# Patient Record
Sex: MALE | Race: WHITE | NOT HISPANIC OR LATINO | Employment: OTHER | URBAN - METROPOLITAN AREA
[De-identification: names, ages, dates, MRNs, and addresses within clinical notes are randomized per-mention and may not be internally consistent; named-entity substitution may affect disease eponyms.]

---

## 2017-01-01 ENCOUNTER — OFFICE VISIT (OUTPATIENT)
Dept: RETAIL CLINIC | Facility: CLINIC | Age: 77
End: 2017-01-01

## 2017-01-01 ENCOUNTER — HOSPITAL ENCOUNTER (OUTPATIENT)
Dept: CARDIOLOGY | Facility: HOSPITAL | Age: 77
Setting detail: RECURRING SERIES
Discharge: HOME | End: 2017-08-04

## 2017-01-01 ENCOUNTER — TELEPHONE (OUTPATIENT)
Dept: CARDIOLOGY | Facility: CLINIC | Age: 77
End: 2017-01-01

## 2017-01-01 ENCOUNTER — HOSPITAL ENCOUNTER (OUTPATIENT)
Dept: CARDIOLOGY | Facility: HOSPITAL | Age: 77
Setting detail: RECURRING SERIES
Discharge: HOME | End: 2017-09-25

## 2017-01-01 ENCOUNTER — HOSPITAL ENCOUNTER (OUTPATIENT)
Dept: CARDIOLOGY | Facility: HOSPITAL | Age: 77
Discharge: HOME | End: 2017-11-14
Attending: INTERNAL MEDICINE | Admitting: INTERNAL MEDICINE

## 2017-01-01 ENCOUNTER — HOSPITAL ENCOUNTER (OUTPATIENT)
Dept: CARDIOLOGY | Facility: HOSPITAL | Age: 77
Setting detail: RECURRING SERIES
Discharge: HOME | End: 2017-09-01

## 2017-01-01 ENCOUNTER — HOSPITAL ENCOUNTER (OUTPATIENT)
Dept: CARDIOLOGY | Facility: HOSPITAL | Age: 77
Setting detail: RECURRING SERIES
Discharge: HOME | End: 2017-09-22

## 2017-01-01 ENCOUNTER — OFFICE VISIT (OUTPATIENT)
Dept: CARDIOLOGY | Facility: CLINIC | Age: 77
End: 2017-01-01

## 2017-01-01 ENCOUNTER — HOSPITAL ENCOUNTER (OUTPATIENT)
Dept: CARDIOLOGY | Facility: HOSPITAL | Age: 77
Setting detail: RECURRING SERIES
Discharge: HOME | End: 2017-11-03

## 2017-01-01 ENCOUNTER — HOSPITAL ENCOUNTER (OUTPATIENT)
Dept: CARDIOLOGY | Facility: HOSPITAL | Age: 77
Setting detail: RECURRING SERIES
Discharge: HOME | End: 2017-10-20

## 2017-01-01 ENCOUNTER — HOSPITAL ENCOUNTER (OUTPATIENT)
Dept: CARDIOLOGY | Facility: HOSPITAL | Age: 77
Setting detail: RECURRING SERIES
Discharge: HOME | End: 2017-12-01

## 2017-01-01 ENCOUNTER — HOSPITAL ENCOUNTER (OUTPATIENT)
Dept: CARDIOLOGY | Facility: HOSPITAL | Age: 77
Setting detail: RECURRING SERIES
Discharge: HOME | End: 2017-07-07

## 2017-01-01 VITALS
BODY MASS INDEX: 19.55 KG/M2 | HEART RATE: 86 BPM | SYSTOLIC BLOOD PRESSURE: 110 MMHG | WEIGHT: 129 LBS | HEIGHT: 68 IN | DIASTOLIC BLOOD PRESSURE: 64 MMHG

## 2017-01-01 VITALS
DIASTOLIC BLOOD PRESSURE: 60 MMHG | HEART RATE: 124 BPM | OXYGEN SATURATION: 98 % | RESPIRATION RATE: 16 BRPM | SYSTOLIC BLOOD PRESSURE: 90 MMHG

## 2017-01-01 VITALS
SYSTOLIC BLOOD PRESSURE: 110 MMHG | HEART RATE: 79 BPM | BODY MASS INDEX: 19.93 KG/M2 | WEIGHT: 124 LBS | DIASTOLIC BLOOD PRESSURE: 64 MMHG | HEIGHT: 66 IN

## 2017-01-01 DIAGNOSIS — J43.9 PULMONARY EMPHYSEMA, UNSPECIFIED EMPHYSEMA TYPE (HCC): ICD-10-CM

## 2017-01-01 DIAGNOSIS — I48.20 CHRONIC ATRIAL FIBRILLATION (HCC): ICD-10-CM

## 2017-01-01 DIAGNOSIS — Z95.5 S/P DRUG ELUTING CORONARY STENT PLACEMENT: ICD-10-CM

## 2017-01-01 DIAGNOSIS — I25.10 CORONARY ARTERY DISEASE INVOLVING NATIVE CORONARY ARTERY OF NATIVE HEART WITHOUT ANGINA PECTORIS: ICD-10-CM

## 2017-01-01 DIAGNOSIS — I48.91 ATRIAL FIBRILLATION (HCC): ICD-10-CM

## 2017-01-01 DIAGNOSIS — I10 ESSENTIAL HYPERTENSION: ICD-10-CM

## 2017-01-01 DIAGNOSIS — S51.812A SKIN TEAR OF LEFT FOREARM WITHOUT COMPLICATION, INITIAL ENCOUNTER: Primary | ICD-10-CM

## 2017-01-01 DIAGNOSIS — Z99.2 HEMODIALYSIS PATIENT (HCC): ICD-10-CM

## 2017-01-01 DIAGNOSIS — E78.49 OTHER HYPERLIPIDEMIA: ICD-10-CM

## 2017-01-01 DIAGNOSIS — Z99.2 STAGE 5 CHRONIC KIDNEY DISEASE ON CHRONIC DIALYSIS (HCC): ICD-10-CM

## 2017-01-01 DIAGNOSIS — N18.6 STAGE 5 CHRONIC KIDNEY DISEASE ON CHRONIC DIALYSIS (HCC): ICD-10-CM

## 2017-01-01 DIAGNOSIS — I25.10 CORONARY ARTERY DISEASE INVOLVING NATIVE CORONARY ARTERY OF NATIVE HEART WITHOUT ANGINA PECTORIS: Primary | ICD-10-CM

## 2017-01-01 LAB
BH CV ECHO MEAS - ACS: 1.3 CM
BH CV ECHO MEAS - AO MAX PG (FULL): 4.4 MMHG
BH CV ECHO MEAS - AO MAX PG: 5.6 MMHG
BH CV ECHO MEAS - AO MEAN PG (FULL): 2.7 MMHG
BH CV ECHO MEAS - AO MEAN PG: 3.3 MMHG
BH CV ECHO MEAS - AO ROOT AREA (BSA CORRECTED): 2.1
BH CV ECHO MEAS - AO ROOT AREA: 9.5 CM^2
BH CV ECHO MEAS - AO ROOT DIAM: 3.5 CM
BH CV ECHO MEAS - AO V2 MAX: 118.2 CM/SEC
BH CV ECHO MEAS - AO V2 MEAN: 83.9 CM/SEC
BH CV ECHO MEAS - AO V2 VTI: 23 CM
BH CV ECHO MEAS - AVA(I,A): 1.3 CM^2
BH CV ECHO MEAS - AVA(I,D): 1.3 CM^2
BH CV ECHO MEAS - AVA(V,A): 1.3 CM^2
BH CV ECHO MEAS - AVA(V,D): 1.3 CM^2
BH CV ECHO MEAS - BSA(HAYCOCK): 1.6 M^2
BH CV ECHO MEAS - BSA: 1.6 M^2
BH CV ECHO MEAS - BZI_BMI: 20 KILOGRAMS/M^2
BH CV ECHO MEAS - BZI_METRIC_HEIGHT: 167.6 CM
BH CV ECHO MEAS - BZI_METRIC_WEIGHT: 56.2 KG
BH CV ECHO MEAS - CONTRAST EF 4CH: 41.9 ML/M^2
BH CV ECHO MEAS - EDV(MOD-SP4): 74 ML
BH CV ECHO MEAS - EDV(TEICH): 95.9 ML
BH CV ECHO MEAS - EF(CUBED): 75 %
BH CV ECHO MEAS - EF(MOD-SP4): 41.9 %
BH CV ECHO MEAS - EF(TEICH): 67 %
BH CV ECHO MEAS - ESV(MOD-SP4): 43 ML
BH CV ECHO MEAS - ESV(TEICH): 31.7 ML
BH CV ECHO MEAS - FS: 37 %
BH CV ECHO MEAS - IVS/LVPW: 1.1
BH CV ECHO MEAS - IVSD: 1 CM
BH CV ECHO MEAS - LAT PEAK E' VEL: 9 CM/SEC
BH CV ECHO MEAS - LV DIASTOLIC VOL/BSA (35-75): 45.3 ML/M^2
BH CV ECHO MEAS - LV MASS(C)D: 156.9 GRAMS
BH CV ECHO MEAS - LV MASS(C)DI: 96.1 GRAMS/M^2
BH CV ECHO MEAS - LV MAX PG: 1.2 MMHG
BH CV ECHO MEAS - LV MEAN PG: 0.62 MMHG
BH CV ECHO MEAS - LV SYSTOLIC VOL/BSA (12-30): 26.3 ML/M^2
BH CV ECHO MEAS - LV V1 MAX: 54.8 CM/SEC
BH CV ECHO MEAS - LV V1 MEAN: 35.5 CM/SEC
BH CV ECHO MEAS - LV V1 VTI: 10.6 CM
BH CV ECHO MEAS - LVIDD: 4.6 CM
BH CV ECHO MEAS - LVIDS: 2.9 CM
BH CV ECHO MEAS - LVLD AP4: 5.9 CM
BH CV ECHO MEAS - LVLS AP4: 5.3 CM
BH CV ECHO MEAS - LVOT AREA (M): 2.8 CM^2
BH CV ECHO MEAS - LVOT AREA: 2.7 CM^2
BH CV ECHO MEAS - LVOT DIAM: 1.9 CM
BH CV ECHO MEAS - LVPWD: 0.96 CM
BH CV ECHO MEAS - MED PEAK E' VEL: 7 CM/SEC
BH CV ECHO MEAS - MR MAX PG: 69.8 MMHG
BH CV ECHO MEAS - MR MAX VEL: 417.7 CM/SEC
BH CV ECHO MEAS - MV DEC SLOPE: 718 CM/SEC^2
BH CV ECHO MEAS - MV DEC TIME: 0.15 SEC
BH CV ECHO MEAS - MV E MAX VEL: 112.7 CM/SEC
BH CV ECHO MEAS - MV MAX PG: 4.6 MMHG
BH CV ECHO MEAS - MV MEAN PG: 1.5 MMHG
BH CV ECHO MEAS - MV P1/2T MAX VEL: 112.7 CM/SEC
BH CV ECHO MEAS - MV P1/2T: 46 MSEC
BH CV ECHO MEAS - MV V2 MAX: 106.7 CM/SEC
BH CV ECHO MEAS - MV V2 MEAN: 53.4 CM/SEC
BH CV ECHO MEAS - MV V2 VTI: 24.4 CM
BH CV ECHO MEAS - MVA P1/2T LCG: 2 CM^2
BH CV ECHO MEAS - MVA(P1/2T): 4.8 CM^2
BH CV ECHO MEAS - MVA(VTI): 1.2 CM^2
BH CV ECHO MEAS - PA MAX PG (FULL): 2.3 MMHG
BH CV ECHO MEAS - PA MAX PG: 2.9 MMHG
BH CV ECHO MEAS - PA V2 MAX: 84.5 CM/SEC
BH CV ECHO MEAS - PVA(V,A): 1.5 CM^2
BH CV ECHO MEAS - PVA(V,D): 1.5 CM^2
BH CV ECHO MEAS - QP/QS: 1
BH CV ECHO MEAS - RAP SYSTOLE: 15 MMHG
BH CV ECHO MEAS - RV MAX PG: 0.59 MMHG
BH CV ECHO MEAS - RV MEAN PG: 0.39 MMHG
BH CV ECHO MEAS - RV V1 MAX: 38.3 CM/SEC
BH CV ECHO MEAS - RV V1 MEAN: 29.9 CM/SEC
BH CV ECHO MEAS - RV V1 VTI: 9 CM
BH CV ECHO MEAS - RVOT AREA: 3.2 CM^2
BH CV ECHO MEAS - RVOT DIAM: 2 CM
BH CV ECHO MEAS - RVSP: 65.7 MMHG
BH CV ECHO MEAS - SI(AO): 134.8 ML/M^2
BH CV ECHO MEAS - SI(CUBED): 43.9 ML/M^2
BH CV ECHO MEAS - SI(LVOT): 17.7 ML/M^2
BH CV ECHO MEAS - SI(MOD-SP4): 19 ML/M^2
BH CV ECHO MEAS - SI(TEICH): 39.3 ML/M^2
BH CV ECHO MEAS - SUP REN AO DIAM: 1.8 CM
BH CV ECHO MEAS - SV(AO): 220 ML
BH CV ECHO MEAS - SV(CUBED): 71.6 ML
BH CV ECHO MEAS - SV(LVOT): 29 ML
BH CV ECHO MEAS - SV(MOD-SP4): 31 ML
BH CV ECHO MEAS - SV(RVOT): 29 ML
BH CV ECHO MEAS - SV(TEICH): 64.2 ML
BH CV ECHO MEAS - TAPSE (>1.6): 1.3 CM2
BH CV ECHO MEAS - TR MAX VEL: 355.9 CM/SEC
BH CV XLRA - RV BASE: 5 CM
BH CV XLRA - TDI S': 11 CM/SEC
E/E' RATIO: 14
LEFT ATRIUM VOLUME INDEX: 45 ML/M2
LEFT ATRIUM VOLUME: 77 CM3
LV EF 2D ECHO EST: 42 %
MAXIMAL PREDICTED HEART RATE: 143 BPM
STRESS TARGET HR: 122 BPM

## 2017-01-01 PROCEDURE — 36416 COLLJ CAPILLARY BLOOD SPEC: CPT

## 2017-01-01 PROCEDURE — 93000 ELECTROCARDIOGRAM COMPLETE: CPT | Performed by: INTERNAL MEDICINE

## 2017-01-01 PROCEDURE — 85610 PROTHROMBIN TIME: CPT

## 2017-01-01 PROCEDURE — 93306 TTE W/DOPPLER COMPLETE: CPT | Performed by: INTERNAL MEDICINE

## 2017-01-01 PROCEDURE — 99214 OFFICE O/P EST MOD 30 MIN: CPT | Performed by: INTERNAL MEDICINE

## 2017-01-01 PROCEDURE — 99213 OFFICE O/P EST LOW 20 MIN: CPT | Performed by: NURSE PRACTITIONER

## 2017-01-01 PROCEDURE — 93306 TTE W/DOPPLER COMPLETE: CPT

## 2017-01-01 RX ORDER — WARFARIN SODIUM 5 MG/1
TABLET ORAL
Qty: 30 TABLET | Refills: 1 | Status: SHIPPED | OUTPATIENT
Start: 2017-01-01 | End: 2017-01-01 | Stop reason: SDUPTHER

## 2017-01-01 RX ORDER — WARFARIN SODIUM 5 MG/1
TABLET ORAL
Qty: 30 TABLET | Refills: 1 | Status: SHIPPED | OUTPATIENT
Start: 2017-01-01 | End: 2018-01-01 | Stop reason: SDUPTHER

## 2017-01-01 RX ORDER — METOPROLOL SUCCINATE 50 MG/1
50 TABLET, EXTENDED RELEASE ORAL DAILY
Qty: 90 TABLET | Refills: 0 | Status: SHIPPED | OUTPATIENT
Start: 2017-01-01 | End: 2017-01-01 | Stop reason: SDUPTHER

## 2017-01-01 RX ORDER — WARFARIN SODIUM 2.5 MG/1
TABLET ORAL
Qty: 30 TABLET | Refills: 0 | Status: SHIPPED | OUTPATIENT
Start: 2017-01-01

## 2017-01-01 RX ORDER — ATORVASTATIN CALCIUM 40 MG/1
TABLET, FILM COATED ORAL
Qty: 30 TABLET | Refills: 3 | Status: SHIPPED | OUTPATIENT
Start: 2017-01-01 | End: 2018-01-01 | Stop reason: SDUPTHER

## 2017-01-01 RX ORDER — CLOPIDOGREL BISULFATE 75 MG/1
TABLET ORAL
Qty: 30 TABLET | Refills: 5 | Status: SHIPPED | OUTPATIENT
Start: 2017-01-01 | End: 2017-01-01

## 2017-01-01 RX ORDER — METOPROLOL SUCCINATE 50 MG/1
TABLET, EXTENDED RELEASE ORAL
Qty: 90 TABLET | Refills: 0 | Status: SHIPPED | OUTPATIENT
Start: 2017-01-01 | End: 2017-01-01

## 2017-01-01 RX ORDER — ATORVASTATIN CALCIUM 40 MG/1
TABLET, FILM COATED ORAL
Qty: 30 TABLET | Refills: 0 | Status: SHIPPED | OUTPATIENT
Start: 2017-01-01 | End: 2017-01-01 | Stop reason: SDUPTHER

## 2017-01-03 ENCOUNTER — OFFICE VISIT (OUTPATIENT)
Dept: SURGERY | Facility: CLINIC | Age: 77
End: 2017-01-03

## 2017-01-03 VITALS
TEMPERATURE: 98 F | DIASTOLIC BLOOD PRESSURE: 77 MMHG | BODY MASS INDEX: 22.13 KG/M2 | HEART RATE: 102 BPM | OXYGEN SATURATION: 97 % | SYSTOLIC BLOOD PRESSURE: 119 MMHG | HEIGHT: 68 IN | WEIGHT: 146 LBS

## 2017-01-03 DIAGNOSIS — Z79.01 ANTICOAGULATED ON COUMADIN: Primary | ICD-10-CM

## 2017-01-03 PROCEDURE — 99214 OFFICE O/P EST MOD 30 MIN: CPT | Performed by: SURGERY

## 2017-01-03 NOTE — PROGRESS NOTES
Consultation note    Referring physician: Pollo Mccann MD    Consulting Physician: James Fisher MD    Reason for consultation:     Chief Complaint   Patient presents with   • PD CATHETER COMPLICATION     Blood in Periotenal Fluid       HPI:   The patient is a very pleasant 76 y.o. years old male that is referred to my office by Dr. Mccann because of hemoperitoneum.  Patient has end-stage renal disease and is on peritoneal dialysis.  4 days ago he noticed bloody fluid during peritoneal dialysis.  This now has been going on for 4 days.  He reports also some episodes of nonbloody nonbilious vomiting.  Patient initially stated that he has been having several episodes for the past 4 days.  His wife reports that is mainly spitting but not vomiting.  He has been able to tolerate by mouth for the past 3 days without any problem.  He has been passing gas and having bowel movements.  He denies any fevers or chills or any abdominal pain.  He reports no recent abdominal trauma but reports falling over his face 1 month ago.  He has been doing rehabilitation therapy without any problem.  He had a recent underwent cardiac catheterization with drug-eluting stent placement and was started on Plavix.  He was anteriorly on warfarin for chronic atrial fibrillation.  He denies any episode of hemoperitoneum in the past.  Denies any other complaints.  He had peritoneal fluid sent for analysis that show 180 white blood cells and 11,000] red blood cells.  I do not have the differential.  He has been started on antibiotics by Dr. Mccann as well as on heparin irrigation at the peritoneal fluid.  The patient reports having issues with his INR level and warfarin therapy. No other complaints.      Past Medical History   Diagnosis Date   • Anemia    • Atrial fibrillation    • BPH (benign prostatic hyperplasia)    • Chronic kidney disease    • COPD (chronic obstructive pulmonary disease)    • Dialysis patient    • ED (erectile  dysfunction)    • Hyperlipidemia    • Hypertension    • Impaired fasting glucose    • Prostate cancer    • Renal insufficiency        Past Surgical History   Procedure Laterality Date   • Prostatectomy     • Cataract extraction w/ intraocular lens implant Bilateral    • Cardiac catheterization N/A 10/27/2016     Procedure: Coronary angiography;  Surgeon: Jessie Espino MD;  Location:  [facility];  Service:    • Cardiac catheterization N/A 10/27/2016     Procedure: Left Heart Cath;  Surgeon: Jessie Espino MD;  Location:  [facility];  Service:    • Av fistula placement, brachiocephalic Left 11/24/2014     Dr. Collin Byers,   • Angioplasty Left 11/24/2014     Angioplasty of left cephalic vein, 5 mm x 150 mm balloon, Dr. Collin Byers   • Insertion peritoneal dialysis catheter N/A 01/02/2015     Dr. Loi Gutierrez   • Inguinal hernia repair Right 01/02/2015     Open Right Inguinal Hernia repair w/ mesh, Dr. Loi Gutierrez   • Angioplasty Left 04/06/2015     Ligation of side brances x2 of Left Arm Arteriovenous fistula, Percutaneous angioplasty, left arm arteriovenous fistula, Dr. Collin Byers   • Umbilical hernia repair N/A 08/13/2015     Open Umbilical Hernia Repair, Dr. James Fisher         Current Outpatient Prescriptions:   •  atorvastatin (LIPITOR) 40 MG tablet, Take 1 tablet by mouth Every Night., Disp: 30 tablet, Rfl: 11  •  Cholecalciferol (VITAMIN D3) 5000 UNITS capsule capsule, Take 5,000 Units by mouth Daily., Disp: , Rfl:   •  clopidogrel (PLAVIX) 75 MG tablet, Take 1 tablet by mouth Daily., Disp: 30 tablet, Rfl: 11  •  Fluticasone Furoate-Vilanterol 200-25 MCG/INH aerosol powder , Inhale 1 puff daily., Disp: , Rfl:   •  folic acid (FOLVITE) 1 MG tablet, Take 1 mg by mouth daily., Disp: , Rfl:   •  metoprolol succinate XL (TOPROL-XL) 50 MG 24 hr tablet, Take 1 tablet by mouth Daily., Disp: 90 tablet, Rfl: 0  •  sodium bicarbonate 650 MG tablet, Take 650 mg by  mouth 2 (Two) Times a Day., Disp: , Rfl:   •  Umeclidinium Bromide (INCRUSE ELLIPTA) 62.5 MCG/INH aerosol powder , Inhale 1 puff Daily., Disp: , Rfl:   •  warfarin (COUMADIN) 2.5 MG tablet, Take 1.25 mg by mouth See Admin Instructions. PT TAKES 1.25MG TUES-SUN.  PT TAKES 2.5MG ON MONDAY, Disp: , Rfl:   •  warfarin (COUMADIN) 2.5 MG tablet, TAKE 1 TABLET BY MOUTH ON MONDAY & FRIDAY AND 1/2 TABLET ON ALL OTHER DAYS OR AS DIRECTED, Disp: 30 tablet, Rfl: 0  •  acetaminophen (TYLENOL) 500 MG tablet, Take 500 mg by mouth every 6 (six) hours as needed for mild pain (1-3)., Disp: , Rfl:     No Known Allergies    Social History     Social History   • Marital status:      Spouse name: N/A   • Number of children: N/A   • Years of education: N/A     Occupational History   • Not on file.     Social History Main Topics   • Smoking status: Former Smoker     Packs/day: 1.50   • Smokeless tobacco: Not on file      Comment: quit 15 years ago   • Alcohol use 1.2 oz/week     2 Cans of beer per week      Comment: SELDOMLY   • Drug use: No   • Sexual activity: Defer     Other Topics Concern   • Not on file     Social History Narrative       Family History   Problem Relation Age of Onset   • Heart attack Mother    • Heart disease Mother    • Heart attack Father    • Heart disease Father    • No Known Problems Sister    • No Known Problems Maternal Grandmother    • No Known Problems Maternal Grandfather    • No Known Problems Paternal Grandmother    • No Known Problems Paternal Grandfather    • No Known Problems Sister        ROS:   Constitutional: denies any weight changes, reports fatigue and weakness.  Eyes: : denies blurred or double vision  Cardiovascular: denies chest pain, palpitations, reports edemas.  Respiratory: denies cough, sputum, SOB.  Gastrointestinal: reports N&V, denies abd pain, denies diarrhea or constipation.  Genitourinary: denies dysuria, frequency.  Endocrine: denies cold intolerance, lethargy and  flushing.  Hem: denies excessive bruising and postop bleeding.  Musculoskeletal: denies weakness, joint swelling, pain or stiffness.  Neuro: denies seizures, CVA, paresthesia, or peripheral neuropathy.   Skin: denies change in nevi, rashes, masses.    Physical Exam:   Vitals:    01/03/17 1515   BP: 119/77   Pulse: 102   Temp: 98 °F (36.7 °C)   SpO2: 97%     GENERAL:oriented to person, place, and time and chronically ill appearing  HEENT: normochephalic, atraumatic, no scleral icterus moist mucous membranes.  NECK: Supple there is no thyromegaly or lymphadenopathy  CHEST: clear to auscultation, no wheezes, rales or rhonchi, symmetric air entry  CARDIAC: regular rate and rhythm    ABDOMEN: soft, mildly tender at the epigastric area, distended due to intraperitoneal fluid, no masses or organomegaly. PD catheter in place. No rebound or guarding, no peritoneal signs. Incision from hernia repair well healed. Mild erythema at the infraumbilical area. Not tender. Peritoneal fluid is bloody tinged.  EXTREMITIES: no cyanosis, clubbing or edema    NEURO: alert and oriented, normal speech, cranial nerves 2-12 grossly intact, no focal deficits   SKIN: Moist, warm, no rashes.    Assessment and plan:   The patient is a very pleasant 76 y.o. years old male with end-stage renal disease on peritoneal dialysis now with hemoperitoneum and peritonitis.  He has been started on appropriate antibiotic therapy.  Clinically, he is hemodynamically stable and does not have any evidence of profuse intraperitoneal bleeding.  The fluid is blood tinged and not bright red.  He was recently started on Plavix and he has been on warfarin and this likely contributed to the hemoperitoneum.  He denies any recent trauma that may cause intra-abdominal bleeding.  He has small amount of erythema at the umbilicus that I would continue to watch.  I reviewed my operative report and he does not have mesh at the prior repair.  He reports episodes of vomiting but  he has been tolerating diet.  At this time I think he should continue with conservative management.     - I will check INR and CBC to measure his no severely anemic or supratherapeutic with warfarin therapy  - I will discuss with Dr. Espino about his anticoagulation therapy  - Continue peritoneal dialysis.  - Continue regular diet as tolerated  - Discussed with him that if his symptoms worsen or he becomes febrile and is unable to tolerate any by mouth he needs to immediately call me.   - For now I think he can continue with his Plavix and warfarin therapy.   - If he is hemoperitoneum persist then I will order appropriate imaging.  - Patient will follow with me in the office on Friday    Case was discussed with Pollo Mccann MD and patient.    Risk and benefits of the current recommended treatment were explained to the patient that had time to ask questions that where answered, verbalized understanding and agreed with the plan.     James Fisher MD  General, Minimally Invasive and Endoscopic Surgery  [facility]    [address], [address]  [place], [place], [address]  P: [phone]  F: [phone]

## 2017-01-03 NOTE — MR AVS SNAPSHOT
Juan Diego Toure   1/3/2017 3:30 PM   Office Visit    Dept Phone:  [phone]   Encounter #:  [id number]    Provider:  James Fisher MD   Department:  [facility] GENERAL SURGERY                Your Full Care Plan              Your Updated Medication List          This list is accurate as of: 1/3/17  4:41 PM.  Always use your most recent med list.                acetaminophen 500 MG tablet   Commonly known as:  TYLENOL       atorvastatin 40 MG tablet   Commonly known as:  LIPITOR   Take 1 tablet by mouth Every Night.       clopidogrel 75 MG tablet   Commonly known as:  PLAVIX   Take 1 tablet by mouth Daily.       Fluticasone Furoate-Vilanterol 200-25 MCG/INH aerosol powder        folic acid 1 MG tablet   Commonly known as:  FOLVITE       INCRUSE ELLIPTA 62.5 MCG/INH aerosol powder    Generic drug:  Umeclidinium Bromide       metoprolol succinate XL 50 MG 24 hr tablet   Commonly known as:  TOPROL-XL   Take 1 tablet by mouth Daily.       sodium bicarbonate 650 MG tablet       vitamin D3 5000 UNITS capsule capsule       * warfarin 2.5 MG tablet   Commonly known as:  COUMADIN   TAKE 1 TABLET BY MOUTH ON MONDAY & FRIDAY AND 1/2 TABLET ON ALL OTHER DAYS OR AS DIRECTED       * warfarin 2.5 MG tablet   Commonly known as:  COUMADIN       * Notice:  This list has 2 medication(s) that are the same as other medications prescribed for you. Read the directions carefully, and ask your doctor or other care provider to review them with you.            Instructions     None    Patient Instructions History      Upcoming Appointments     Visit Type Date Time Department    OFFICE VISIT 1/3/2017  3:30 PM [facility]    PHASE II - CARD 1/4/2017  8:00 AM [facility] CARD [facility]    PHASE II - CARD 1/6/2017  8:00 AM [facility]ARD [facility]    PHASE II - CARD 1/9/2017  8:00 AM [facility]    PHASE II - CARD 1/11/2017  8:00 AM [facility]    PHASE II - CARD 1/13/2017  8:00 AM [facility]  CARD REHAB    PHASE II - CARD 2017  8:00 AM [facility]ARD [facility]    PHASE II - CARD 2017  8:00 AM [facility]    PHASE II - CARD 2017  8:00 AM [facility]    PHASE II - CARD 2017  8:00 AM [facility]    PHASE II - CARD 2017  8:00 AM [facility]    PHASE II - CARD 2017  8:00 AM [facility]    PHASE II - CARD 2017  8:00 AM [facility]    PHASE II - CARD 2017  8:00 AM [facility]ARD [facility]    PHASE II - CARD 2/3/2017  8:00 AM [facility]ARD [facility]    PHASE II - CARD 2017  8:00 AM [facility]ARD [facility]    FOLLOW UP 3/7/2017 10:00 AM [facility]      [employer] Signup     [facility] [employer] allows you to send messages to your doctor, view your test results, renew your prescriptions, schedule appointments, and more. To sign up, go to [employer] and click on the Sign Up Now link in the New User? box. Enter your [employer] Activation Code exactly as it appears below along with the last four digits of your Social Security Number and your Date of Birth () to complete the sign-up process. If you do not sign up before the expiration date, you must request a new code.    [employer] Activation Code: [email or web address]  Expires: 2017  4:41 PM    If you have questions, you can email [email or web address] or call [phone] to talk to our [employer] staff. Remember, [employer] is NOT to be used for urgent needs. For medical emergencies, dial 911.               Other Info from Your Visit           Your Appointments     2017  8:00 AM EST   PHASE II CARDIAC REHAB with TELEMETRY MONITOR -  [facility] CARD REH[facility] ([place])    [address] [facility] [address]   [phone]            2017  8:00 AM EST   PHASE II CARDIAC REHAB with TELEMETRY MONITOR - [facility] REHAB ([place])    [address] [facility] [address] [phone]              "2017  8:00 AM EST   PHASE II CARDIAC REHAB with TELEMETRY MONITOR - [facility] REHAB ([place])    [address] [facility] [address]   [phone]            Jan 11, 2017  8:00 AM EST   PHASE II CARDIAC REHAB with TELEMETRY MONITOR -  [facility] ([place])    [address] [facility] [address]   [phone]            Jan 13, 2017  8:00 AM EST   PHASE II CARDIAC REHAB with TELEMETRY MONITOR - [facility] REHAB ([place])    [address] [facility] [address]   [phone]              Allergies     No Known Allergies      Reason for Visit     PD CATHETER COMPLICATION Blood in Periotenal Fluid      Vital Signs     Blood Pressure Pulse Temperature Height Weight Oxygen Saturation    119/77 (BP Location: Right arm, Patient Position: Sitting, Cuff Size: Adult) 102 98 °F (36.7 °C) (Oral) 67.5\" (171.5 cm) 146 lb (66.2 kg) 97%    Body Mass Index Smoking Status                22.53 kg/m2 Former Smoker            "

## 2017-01-03 NOTE — LETTER
January 3, 2017      Pollo Mccann MD  [address]  [address]  [place] [id number]    Patient: Juan Diego Toure   YOB: 1940   Date of Visit: 1/3/2017       Dear Dr. Siobhan MD:    Thank you for referring Juan Diego Toure to me for evaluation. Below is a copy of my consult note.    If you have questions, please do not hesitate to call me. I look forward to following Juan Diego along with you.         Sincerely,        James Fisher MD        CC: MD Jessie Loza MD    Consultation note    Referring physician: Pollo Mccann MD    Consulting Physician: James Fisher MD    Reason for consultation:     Chief Complaint   Patient presents with   • PD CATHETER COMPLICATION     Blood in Periotenal Fluid       HPI:   The patient is a very pleasant 76 y.o. years old male that is referred to my office by Dr. Mccann because of hemoperitoneum.  Patient has end-stage renal disease and is on peritoneal dialysis.  4 days ago he noticed bloody fluid during peritoneal dialysis.  This now has been going on for 4 days.  He reports also some episodes of nonbloody nonbilious vomiting.  Patient initially stated that he has been having several episodes for the past 4 days.  His wife reports that is mainly spitting but not vomiting.  He has been able to tolerate by mouth for the past 3 days without any problem.  He has been passing gas and having bowel movements.  He denies any fevers or chills or any abdominal pain.  He reports no recent abdominal trauma but reports falling over his face 1 month ago.  He has been doing rehabilitation therapy without any problem.  He had a recent underwent cardiac catheterization with drug-eluting stent placement and was started on Plavix.  He was anteriorly on warfarin for chronic atrial fibrillation.  He denies any episode of hemoperitoneum in the past.  Denies any other complaints.  He had peritoneal fluid sent for analysis that show 180 white  blood cells and 11,000] red blood cells.  I do not have the differential.  He has been started on antibiotics by Dr. Mccann as well as on heparin irrigation at the peritoneal fluid.  The patient reports having issues with his INR level and warfarin therapy. No other complaints.      Past Medical History   Diagnosis Date   • Anemia    • Atrial fibrillation    • BPH (benign prostatic hyperplasia)    • Chronic kidney disease    • COPD (chronic obstructive pulmonary disease)    • Dialysis patient    • ED (erectile dysfunction)    • Hyperlipidemia    • Hypertension    • Impaired fasting glucose    • Prostate cancer    • Renal insufficiency        Past Surgical History   Procedure Laterality Date   • Prostatectomy     • Cataract extraction w/ intraocular lens implant Bilateral    • Cardiac catheterization N/A 10/27/2016     Procedure: Coronary angiography;  Surgeon: Jessie Espino MD;  Location:  [facility];  Service:    • Cardiac catheterization N/A 10/27/2016     Procedure: Left Heart Cath;  Surgeon: Jessie Espino MD;  Location:  [facility];  Service:    • Av fistula placement, brachiocephalic Left 11/24/2014     Dr. Collin Byers,   • Angioplasty Left 11/24/2014     Angioplasty of left cephalic vein, 5 mm x 150 mm balloon, Dr. Collin Byers   • Insertion peritoneal dialysis catheter N/A 01/02/2015     Dr. Loi Gutierrez   • Inguinal hernia repair Right 01/02/2015     Open Right Inguinal Hernia repair w/ mesh, Dr. Loi Gutierrez   • Angioplasty Left 04/06/2015     Ligation of side brances x2 of Left Arm Arteriovenous fistula, Percutaneous angioplasty, left arm arteriovenous fistula, Dr. Collin Byers   • Umbilical hernia repair N/A 08/13/2015     Open Umbilical Hernia Repair, Dr. James Fisher         Current Outpatient Prescriptions:   •  atorvastatin (LIPITOR) 40 MG tablet, Take 1 tablet by mouth Every Night., Disp: 30 tablet, Rfl: 11  •  Cholecalciferol (VITAMIN D3) 5000  UNITS capsule capsule, Take 5,000 Units by mouth Daily., Disp: , Rfl:   •  clopidogrel (PLAVIX) 75 MG tablet, Take 1 tablet by mouth Daily., Disp: 30 tablet, Rfl: 11  •  Fluticasone Furoate-Vilanterol 200-25 MCG/INH aerosol powder , Inhale 1 puff daily., Disp: , Rfl:   •  folic acid (FOLVITE) 1 MG tablet, Take 1 mg by mouth daily., Disp: , Rfl:   •  metoprolol succinate XL (TOPROL-XL) 50 MG 24 hr tablet, Take 1 tablet by mouth Daily., Disp: 90 tablet, Rfl: 0  •  sodium bicarbonate 650 MG tablet, Take 650 mg by mouth 2 (Two) Times a Day., Disp: , Rfl:   •  Umeclidinium Bromide (INCRUSE ELLIPTA) 62.5 MCG/INH aerosol powder , Inhale 1 puff Daily., Disp: , Rfl:   •  warfarin (COUMADIN) 2.5 MG tablet, Take 1.25 mg by mouth See Admin Instructions. PT TAKES 1.25MG TUES-SUN.  PT TAKES 2.5MG ON MONDAY, Disp: , Rfl:   •  warfarin (COUMADIN) 2.5 MG tablet, TAKE 1 TABLET BY MOUTH ON MONDAY & FRIDAY AND 1/2 TABLET ON ALL OTHER DAYS OR AS DIRECTED, Disp: 30 tablet, Rfl: 0  •  acetaminophen (TYLENOL) 500 MG tablet, Take 500 mg by mouth every 6 (six) hours as needed for mild pain (1-3)., Disp: , Rfl:     No Known Allergies    Social History     Social History   • Marital status:      Spouse name: N/A   • Number of children: N/A   • Years of education: N/A     Occupational History   • Not on file.     Social History Main Topics   • Smoking status: Former Smoker     Packs/day: 1.50   • Smokeless tobacco: Not on file      Comment: quit 15 years ago   • Alcohol use 1.2 oz/week     2 Cans of beer per week      Comment: SELDOMLY   • Drug use: No   • Sexual activity: Defer     Other Topics Concern   • Not on file     Social History Narrative       Family History   Problem Relation Age of Onset   • Heart attack Mother    • Heart disease Mother    • Heart attack Father    • Heart disease Father    • No Known Problems Sister    • No Known Problems Maternal Grandmother    • No Known Problems Maternal Grandfather    • No Known Problems  Paternal Grandmother    • No Known Problems Paternal Grandfather    • No Known Problems Sister        ROS:   Constitutional: denies any weight changes, reports fatigue and weakness.  Eyes: : denies blurred or double vision  Cardiovascular: denies chest pain, palpitations, reports edemas.  Respiratory: denies cough, sputum, SOB.  Gastrointestinal: reports N&V, denies abd pain, denies diarrhea or constipation.  Genitourinary: denies dysuria, frequency.  Endocrine: denies cold intolerance, lethargy and flushing.  Hem: denies excessive bruising and postop bleeding.  Musculoskeletal: denies weakness, joint swelling, pain or stiffness.  Neuro: denies seizures, CVA, paresthesia, or peripheral neuropathy.   Skin: denies change in nevi, rashes, masses.    Physical Exam:   Vitals:    01/03/17 1515   BP: 119/77   Pulse: 102   Temp: 98 °F (36.7 °C)   SpO2: 97%     GENERAL:oriented to person, place, and time and chronically ill appearing  HEENT: normochephalic, atraumatic, no scleral icterus moist mucous membranes.  NECK: Supple there is no thyromegaly or lymphadenopathy  CHEST: clear to auscultation, no wheezes, rales or rhonchi, symmetric air entry  CARDIAC: regular rate and rhythm    ABDOMEN: soft, mildly tender at the epigastric area, distended due to intraperitoneal fluid, no masses or organomegaly. PD catheter in place. No rebound or guarding, no peritoneal signs. Incision from hernia repair well healed. Mild erythema at the infraumbilical area. Not tender. Peritoneal fluid is bloody tinged.  EXTREMITIES: no cyanosis, clubbing or edema    NEURO: alert and oriented, normal speech, cranial nerves 2-12 grossly intact, no focal deficits   SKIN: Moist, warm, no rashes.    Assessment and plan:   The patient is a very pleasant 76 y.o. years old male with end-stage renal disease on peritoneal dialysis now with hemoperitoneum and peritonitis.  He has been started on appropriate antibiotic therapy.  Clinically, he is hemodynamically  stable and does not have any evidence of profuse intraperitoneal bleeding.  The fluid is blood tinged and not bright red.  He was recently started on Plavix and he has been on warfarin and this likely contributed to the hemoperitoneum.  He denies any recent trauma that may cause intra-abdominal bleeding.  He has small amount of erythema at the umbilicus that I would continue to watch.  I reviewed my operative report and he does not have mesh at the prior repair.  He reports episodes of vomiting but he has been tolerating diet.  At this time I think he should continue with conservative management.     - I will check INR and CBC to measure his no severely anemic or supratherapeutic with warfarin therapy  - I will discuss with Dr. Espino about his anticoagulation therapy  - Continue peritoneal dialysis.  - Continue regular diet as tolerated  - Discussed with him that if his symptoms worsen or he becomes febrile and is unable to tolerate any by mouth he needs to immediately call me.   - For now I think he can continue with his Plavix and warfarin therapy.   - If he is hemoperitoneum persist then I will order appropriate imaging.  - Patient will follow with me in the office on Friday    Case was discussed with Pollo Mccann MD and patient.    Risk and benefits of the current recommended treatment were explained to the patient that had time to ask questions that where answered, verbalized understanding and agreed with the plan.     James Fisher MD  General, Minimally Invasive and Endoscopic Surgery  [facility]    [address], [address]  [place], [place], [address]  P: [phone]  F: [phone]

## 2017-01-04 ENCOUNTER — TELEPHONE (OUTPATIENT)
Dept: SURGERY | Facility: CLINIC | Age: 77
End: 2017-01-04

## 2017-01-04 ENCOUNTER — APPOINTMENT (OUTPATIENT)
Dept: LAB | Facility: HOSPITAL | Age: 77
End: 2017-01-04

## 2017-01-04 LAB
BASOPHILS # BLD AUTO: 0.01 10*3/MM3 (ref 0–0.2)
BASOPHILS NFR BLD AUTO: 0.2 % (ref 0–1.5)
DEPRECATED RDW RBC AUTO: 56.9 FL (ref 37–54)
EOSINOPHIL # BLD AUTO: 0.01 10*3/MM3 (ref 0–0.7)
EOSINOPHIL NFR BLD AUTO: 0.2 % (ref 0.3–6.2)
ERYTHROCYTE [DISTWIDTH] IN BLOOD BY AUTOMATED COUNT: 16.2 % (ref 11.5–14.5)
HCT VFR BLD AUTO: 28.1 % (ref 40.4–52.2)
HGB BLD-MCNC: 8.6 G/DL (ref 13.7–17.6)
IMM GRANULOCYTES # BLD: 0.02 10*3/MM3 (ref 0–0.03)
IMM GRANULOCYTES NFR BLD: 0.4 % (ref 0–0.5)
INR PPP: 7.72 (ref 0.9–1.1)
LYMPHOCYTES # BLD AUTO: 0.42 10*3/MM3 (ref 0.9–4.8)
LYMPHOCYTES NFR BLD AUTO: 7.7 % (ref 19.6–45.3)
MCH RBC QN AUTO: 29.5 PG (ref 27–32.7)
MCHC RBC AUTO-ENTMCNC: 30.6 G/DL (ref 32.6–36.4)
MCV RBC AUTO: 96.2 FL (ref 79.8–96.2)
MONOCYTES # BLD AUTO: 0.51 10*3/MM3 (ref 0.2–1.2)
MONOCYTES NFR BLD AUTO: 9.3 % (ref 5–12)
NEUTROPHILS # BLD AUTO: 4.51 10*3/MM3 (ref 1.9–8.1)
NEUTROPHILS NFR BLD AUTO: 82.2 % (ref 42.7–76)
PLATELET # BLD AUTO: 220 10*3/MM3 (ref 140–500)
PMV BLD AUTO: 9.7 FL (ref 6–12)
PROTHROMBIN TIME: 63.2 SECONDS (ref 11.7–14.2)
RBC # BLD AUTO: 2.92 10*6/MM3 (ref 4.6–6)
WBC NRBC COR # BLD: 5.48 10*3/MM3 (ref 4.5–10.7)

## 2017-01-04 PROCEDURE — 36415 COLL VENOUS BLD VENIPUNCTURE: CPT | Performed by: SURGERY

## 2017-01-04 PROCEDURE — 85610 PROTHROMBIN TIME: CPT | Performed by: SURGERY

## 2017-01-04 PROCEDURE — 85025 COMPLETE CBC W/AUTO DIFF WBC: CPT | Performed by: SURGERY

## 2017-01-04 NOTE — TELEPHONE ENCOUNTER
I spoke with the patient's wife regarding his laboratory results.  He had a critical value on his INR of 7.7.  He has been on Coumadin therapy.  His hemoglobin is 8.8 and dropped only 1 g since 2 months ago.  I discussed with the wife and the patient remained to be hemodynamically stable and does not have any signs of anemia.  No hypotension and no orthostasis no dizziness.  Patient reported the peritoneal fluid is blood tinged.  I recommend that he stops warfarin for now.  He should avoid any activities that can cause any trauma.  I discussed with them about the need to come to the emergency room if he is to having any symptoms.   He should follow up with me on Friday as well as at the, in clinic on Friday as per Dr. Espino.     The patient wife verbalized understanding and agree with the plan.    James Fisher MD  General, Minimally Invasive and Endoscopic Surgery  [facility]    [address], [address]  [place], [place], [address]  P: [phone]  F: [phone]

## 2017-01-06 ENCOUNTER — HOSPITAL ENCOUNTER (OUTPATIENT)
Dept: CARDIOLOGY | Facility: HOSPITAL | Age: 77
Setting detail: RECURRING SERIES
Discharge: HOME | End: 2017-01-06

## 2017-01-06 ENCOUNTER — OFFICE VISIT (OUTPATIENT)
Dept: SURGERY | Facility: CLINIC | Age: 77
End: 2017-01-06

## 2017-01-06 DIAGNOSIS — K66.1: Primary | ICD-10-CM

## 2017-01-06 PROCEDURE — 99213 OFFICE O/P EST LOW 20 MIN: CPT | Performed by: SURGERY

## 2017-01-06 PROCEDURE — 85610 PROTHROMBIN TIME: CPT | Performed by: INTERNAL MEDICINE

## 2017-01-06 PROCEDURE — 36416 COLLJ CAPILLARY BLOOD SPEC: CPT | Performed by: INTERNAL MEDICINE

## 2017-01-06 NOTE — PROGRESS NOTES
CC: Follow up hemoperitoneum and peritonitis    S: The patient is here today for follow-up.  He started treatment for peritonitis.  His INR was 7.72 days ago and Coumadin was discontinued.  Wife reports the fluid has been getting slowly clear.  His nausea and vomiting have resolved and he is being able to tolerate by mouth without any problem.  Denies any dizziness or headaches.  No fevers or chills.  Wife reports she is not able to remove all of the 2.5 L from his abdomen but probably 2 L.    Past Medical History   Diagnosis Date   • Anemia    • Atrial fibrillation    • BPH (benign prostatic hyperplasia)    • Chronic kidney disease    • COPD (chronic obstructive pulmonary disease)    • Dialysis patient    • ED (erectile dysfunction)    • Hyperlipidemia    • Hypertension    • Impaired fasting glucose    • Prostate cancer    • Renal insufficiency      Past Surgical History   Procedure Laterality Date   • Prostatectomy     • Cataract extraction w/ intraocular lens implant Bilateral    • Cardiac catheterization N/A 10/27/2016     Procedure: Coronary angiography;  Surgeon: Jessie Espino MD;  Location:  [facility];  Service:    • Cardiac catheterization N/A 10/27/2016     Procedure: Left Heart Cath;  Surgeon: Jessie Espino MD;  Location:  [facility];  Service:    • Av fistula placement, brachiocephalic Left 11/24/2014     Dr. Collin Byers,   • Angioplasty Left 11/24/2014     Angioplasty of left cephalic vein, 5 mm x 150 mm balloon, Dr. Collin Byers   • Insertion peritoneal dialysis catheter N/A 01/02/2015     Dr. Loi Gutierrez   • Inguinal hernia repair Right 01/02/2015     Open Right Inguinal Hernia repair w/ mesh, Dr. Loi Gutierrez   • Angioplasty Left 04/06/2015     Ligation of side brances x2 of Left Arm Arteriovenous fistula, Percutaneous angioplasty, left arm arteriovenous fistula, Dr. Collin Byers   • Umbilical hernia repair N/A 08/13/2015     Open Umbilical Hernia  Repair, Dr. James Fisher     Afebrile vital signs are stable.  Alert oriented ×3 his not any acute distress.  Head is normocephalic and atraumatic.  There's no scleral icterus  He is breathing comfortable and room air  Regular rate and rhythm  Abdomen is soft is distended and is not tender.  Bowel sounds are positive.  There is no rebound or guarding and there is no peritoneal signs.  Peritoneal dialysis catheter is in place in the left abdomen.  The fluid is blood tinged but significantly clear comparing with last visit.  There's a small amount of periumbilical erythema but there is no pain or warmth.    inr 7.7 hgb 8.6    Assessment and plan    The patient is a very pleasant 76-year-old male with end-stage renal disease on peritoneal dialysis.  He had hemoperitoneum secondary to coagulopathy due to Coumadin toxicity with an INR of 7.7.  He was also anemic but he was hemodynamically stable.  He also had peritonitis that is currently being treated by Dr. Mccann.  The patient looks great today and he has been able to tolerate by mouth.  His catheter is working okay but may take several days to reabsorb all the clotted blood and improve catheter function.     - I would recommend the patient continue to refrain from using coumading for now until fluid clears  - He will follow-up today at the Coumadin clinic as well as Dr. Mccann's office  - Continue peritoneal dialysis and treatment for peritonitis  - There is no surgical intervention that is needed for now  - He will call my office if continued to have bloody drainage from the dialysate    James Fisher MD  General, Minimally Invasive and Endoscopic Surgery  [facility]    [address] [place] [address], [address]  [place], [place], [address]  P: [phone]  F: [phone]

## 2017-01-06 NOTE — MR AVS SNAPSHOT
Juan Diego Toure   1/6/2017 11:00 AM   Office Visit    Dept Phone:  [phone]   Encounter #:  [id number]    Provider:  James Fisher MD   Department:  [facility]                Your Full Care Plan              Your Updated Medication List          This list is accurate as of: 1/6/17 11:44 AM.  Always use your most recent med list.                acetaminophen 500 MG tablet   Commonly known as:  TYLENOL       atorvastatin 40 MG tablet   Commonly known as:  LIPITOR   Take 1 tablet by mouth Every Night.       clopidogrel 75 MG tablet   Commonly known as:  PLAVIX   Take 1 tablet by mouth Daily.       Fluticasone Furoate-Vilanterol 200-25 MCG/INH aerosol powder        folic acid 1 MG tablet   Commonly known as:  FOLVITE       INCRUSE ELLIPTA 62.5 MCG/INH aerosol powder    Generic drug:  Umeclidinium Bromide       metoprolol succinate XL 50 MG 24 hr tablet   Commonly known as:  TOPROL-XL   Take 1 tablet by mouth Daily.       sodium bicarbonate 650 MG tablet       vitamin D3 5000 UNITS capsule capsule       * warfarin 2.5 MG tablet   Commonly known as:  COUMADIN   TAKE 1 TABLET BY MOUTH ON MONDAY & FRIDAY AND 1/2 TABLET ON ALL OTHER DAYS OR AS DIRECTED       * warfarin 2.5 MG tablet   Commonly known as:  COUMADIN       * Notice:  This list has 2 medication(s) that are the same as other medications prescribed for you. Read the directions carefully, and ask your doctor or other care provider to review them with you.            You Were Diagnosed With        Codes Comments    Hemoperitoneum (nontraumatic)    -  Primary ICD-10-CM: K66.1  ICD-9-CM: 568.81       Instructions     None    Patient Instructions History      Upcoming Appointments     Visit Type Date Time Department    PHASE II - CARD 1/6/2017  8:00 AM  [facility]    FOLLOW UP 1/6/2017 11:00 AM [facility]    PT/INR 1/6/2017 11:40 AM  [facility]ARD [facility]    PHASE II - CARD 1/9/2017   8:00 AM [facility]    PHASE II - CARD 2017  8:00 AM [facility]    PHASE II - CARD 2017  8:00 AM [facility]    PHASE II - CARD 2017  8:00 AM [facility]    PHASE II - CARD 2017  8:00 AM [facility]    PHASE II - CARD 2017  8:00 AM [facility]    PHASE II - CARD 2017  8:00 AM [facility]    PHASE II - CARD 2017  8:00 AM [facility]    PHASE II - CARD 2017  8:00 AM [facility]    PHASE II - CARD 2017  8:00 AM [facility]    PHASE II - CARD 2017  8:00 AM [facility]    PHASE II - CARD 2/3/2017  8:00 AM [facility]    PHASE II - CARD 2017  8:00 AM [facility]    FOLLOW UP 3/7/2017 10:00 AM [facility]      [employer] Signup     [facility] [employer] allows you to send messages to your doctor, view your test results, renew your prescriptions, schedule appointments, and more. To sign up, go to [employer] and click on the Sign Up Now link in the New User? box. Enter your [employer] Activation Code exactly as it appears below along with the last four digits of your Social Security Number and your Date of Birth () to complete the sign-up process. If you do not sign up before the expiration date, you must request a new code.    [employer] Activation Code: [email or web address]  Expires: 2017  4:41 PM    If you have questions, you can email [email or web address] or call [phone] to talk to our [employer] staff. Remember, [employer] is NOT to be used for urgent needs. For medical emergencies, dial 911.               Other Info from Your Visit           Your Appointments     2017  8:00 AM EST   PHASE II CARDIAC REHAB with TELEMETRY MONITOR - [facility] ([place])    [address] [facility] [address]   [phone]            2017  8:00 AM EST   PHASE II CARDIAC REHAB with TELEMETRY MONITOR -  [facility] CARD    [facility] ([place])    [address] [facility] [address]   [phone]            Jan 13, 2017  8:00 AM EST   PHASE II CARDIAC REHAB with TELEMETRY MONITOR - [facility]    [address] [facility] [address]   [phone]            Jan 16, 2017  8:00 AM EST   PHASE II CARDIAC REHAB with TELEMETRY MONITOR - [facility]    [address] [facility] [facility] [address]   [phone]            Jan 18, 2017  8:00 AM EST   PHASE II CARDIAC REHAB with TELEMETRY MONITOR - [facility]    [address] [facility] [facility] [address]   [phone]              Allergies     No Known Allergies      Reason for Visit     Follow-up PD Catheter      Vital Signs     Smoking Status                   Former Smoker           Problems and Diagnoses Noted     Nontraumatic hemoperitoneum    -  Primary

## 2017-01-06 NOTE — LETTER
January 6, 2017     Pollo Mccann MD  [address]  [address]  [facility] [address]    Patient: Juan Diego Toure   YOB: 1940   Date of Visit: 1/6/2017       Dear Dr. Siobhan MD:    Juan Diego Torue was in my office today. Below are the relevant portions of my assessment and plan of care.           If you have questions, please do not hesitate to call me. I look forward to following uJan Diego along with you.         Sincerely,        James Fisher MD        CC: Uche Castelan MD     Assessment and plan     The patient is a very pleasant 76-year-old male with end-stage renal disease on peritoneal dialysis. He had hemoperitoneum secondary to coagulopathy due to Coumadin toxicity with an INR of 7.7. He was also anemic but he was hemodynamically stable. He also had peritonitis that is currently being treated by Dr. Mccann. The patient looks great today and he has been able to tolerate by mouth. His catheter is working okay but may take several days to reabsorb all the clotted blood and improve catheter function.      - I would recommend the patient continue to refrain from using coumading for now until fluid clears  - He will follow-up today at the Coumadin clinic as well as Dr. Mccann's office  - Continue peritoneal dialysis and treatment for peritonitis  - There is no surgical intervention that is needed for now  - He will call my office if continued to have bloody drainage from the dialysate

## 2017-01-09 ENCOUNTER — OFFICE VISIT (OUTPATIENT)
Dept: FAMILY MEDICINE CLINIC | Facility: CLINIC | Age: 77
End: 2017-01-09

## 2017-01-09 VITALS
HEIGHT: 68 IN | HEART RATE: 95 BPM | TEMPERATURE: 97.7 F | SYSTOLIC BLOOD PRESSURE: 130 MMHG | DIASTOLIC BLOOD PRESSURE: 82 MMHG | OXYGEN SATURATION: 97 %

## 2017-01-09 DIAGNOSIS — S52.509A CLOSED FRACTURE OF DISTAL END OF RADIUS, UNSPECIFIED FRACTURE MORPHOLOGY, INITIAL ENCOUNTER: Primary | ICD-10-CM

## 2017-01-09 PROCEDURE — 99214 OFFICE O/P EST MOD 30 MIN: CPT | Performed by: NURSE PRACTITIONER

## 2017-01-09 PROCEDURE — 73110 X-RAY EXAM OF WRIST: CPT | Performed by: NURSE PRACTITIONER

## 2017-01-09 NOTE — MR AVS SNAPSHOT
Juan Diego Toure   1/9/2017 1:15 PM   Office Visit    Provider:  LINDA Porter   Department:  [facility]   Dept Phone:  [phone]                Your Full Care Plan              Today's Medication Changes          These changes are accurate as of: 1/9/17  2:23 PM.  If you have any questions, ask your nurse or doctor.               Stop taking medication(s)listed here:     warfarin 2.5 MG tablet   Commonly known as:  COUMADIN   Stopped by:  LINDA Porter                      Your Updated Medication List          This list is accurate as of: 1/9/17  2:23 PM.  Always use your most recent med list.                acetaminophen 500 MG tablet   Commonly known as:  TYLENOL       atorvastatin 40 MG tablet   Commonly known as:  LIPITOR   Take 1 tablet by mouth Every Night.       clopidogrel 75 MG tablet   Commonly known as:  PLAVIX   Take 1 tablet by mouth Daily.       Fluticasone Furoate-Vilanterol 200-25 MCG/INH aerosol powder        folic acid 1 MG tablet   Commonly known as:  FOLVITE       INCRUSE ELLIPTA 62.5 MCG/INH aerosol powder    Generic drug:  Umeclidinium Bromide       metoprolol succinate XL 50 MG 24 hr tablet   Commonly known as:  TOPROL-XL   Take 1 tablet by mouth Daily.       sodium bicarbonate 650 MG tablet       vitamin D3 5000 UNITS capsule capsule               We Performed the Following     XR Wrist 3+ View Left (In Office)       You Were Diagnosed With        Codes Comments    Wrist pain, acute, left    -  Primary ICD-10-CM: M25.532  ICD-9-CM: 719.43       Instructions     None    Patient Instructions History      [employer] Signup     [facility] [employer] allows you to send messages to your doctor, view your test results, renew your prescriptions, schedule appointments, and more. To sign up, go to [employer] and click on the Sign Up Now link in the New User? box. Enter your [employer] Activation Code  "exactly as it appears below along with the last four digits of your Social Security Number and your Date of Birth () to complete the sign-up process. If you do not sign up before the expiration date, you must request a new code.    [employer] Activation Code: [email or web address]  Expires: 2017  4:41 PM    If you have questions, you can email [employer] [email or web address] or call [phone] to talk to our [employer] staff. Remember, [employer]it is NOT to be used for urgent needs. For medical emergencies, dial 911.               Other Info from Your Visit           Your Appointments     2017  8:00 AM EST   PHASE II CARDIAC REHAB with TELEMETRY MONITOR -  [facility] CARD   [facility] CARD REHAB ([facility]    [address] [facility] [address]   [phone]            2017  8:00 AM EST   PHASE II [facility]ARDIAC [facility]   [facility] ([place])    [address] [facility] [address]   [phone]            2017  8:00 AM EST   PHASE II CARDIAC REHAB with TELEMETRY MONITOR - [facility] CARD   [facility]ARD REHAB ([facility]    [address] [facility] [address]   [phone]            2017  8:00 AM EST   PHASE II CARDIAC REHAB with TELEMETRY MONITOR [facility]   [facility] REH[facility] ([place])    [address] [facility] [address]   [phone]            2017  8:00 AM EST   PHASE II CARDIAC REHAB with TELEMETRY MONITOR [facility]   [facility] REHAB ([facility]    [address] [facility] [address]   [phone]              Allergies     No Known Allergies      Reason for Visit     Fall @ home , bruised elbow, skin tear on back side, Saturday    Wrist Injury Lt.      Vital Signs     Blood Pressure Pulse Temperature Height Oxygen Saturation Smoking Status    130/82 95 97.7 °F (36.5 °C) 67.5\" (171.5 cm) 97% Former Smoker    "   Problems and Diagnoses Noted     Pain in wrist    -  Primary

## 2017-01-09 NOTE — PROGRESS NOTES
Subjective   Juan Diego Toure is a 76 y.o. male.     History of Present Illness   Patient complains of left wrist pain. The symptoms began 2 days ago.  Pain is a result of a fall. Pain is located wrist region.  Patient reports he was wearing socks on hardwood floor and slipped. He is not sure exactly how he landed but knows he landed on hand/wrist and hit his elbows.  Reports some skin tears to bilateral elbows.  Discomfort is described as aching, soreness and stiffness. Symptoms are exacerbated by pressure applied to area and movement.  Evaluation to date: none. Therapy to date includes: rest.      The following portions of the patient's history were reviewed and updated as appropriate: allergies, current medications, past family history, past medical history, past social history, past surgical history and problem list.    Review of Systems   Musculoskeletal: Positive for arthralgias and joint swelling.   Skin: Positive for wound.   Neurological: Positive for weakness. Negative for numbness.       Objective   Physical Exam   Constitutional: He is oriented to person, place, and time. He appears well-developed and well-nourished.   HENT:   Head: Normocephalic and atraumatic.   Pulmonary/Chest: Effort normal.   Musculoskeletal:        Left wrist: He exhibits decreased range of motion, tenderness, bony tenderness and swelling.        Arms:  Tenderness to palpation of distall ulna.  Negative for snuffbox tenderness.    Neurological: He is alert and oriented to person, place, and time.   Skin: Skin is warm and dry. Abrasion noted.   Patient has skin tears to bilateral olecranon areas.  Skin flap is already reattached. Small amount of serosang drainage to dressing.  Nonstick dressing and tubular netting applied.    Psychiatric: He has a normal mood and affect. Judgment normal.   Vitals reviewed.      Assessment/Plan   Juan Diego was seen today for fall and wrist injury.    Diagnoses and all orders for this visit:    Closed  fracture of distal end of radius, unspecified fracture morphology, initial encounter  -     XR Wrist 3+ View Left (In Office)  -     Ambulatory Referral to Hand Surgery          3 view xray of left wrist ordered.  Distal radius fracture noted.  No comparison available. Patient given copy of xrays for appt with specialist. Patient and wife instructed to use OTC wrist splint or ACE wrap to immobilize.

## 2017-01-11 ENCOUNTER — HOSPITAL ENCOUNTER (OUTPATIENT)
Dept: CARDIOLOGY | Facility: HOSPITAL | Age: 77
Setting detail: RECURRING SERIES
Discharge: HOME | End: 2017-01-11

## 2017-01-11 PROCEDURE — 85610 PROTHROMBIN TIME: CPT | Performed by: INTERNAL MEDICINE

## 2017-01-11 PROCEDURE — 36416 COLLJ CAPILLARY BLOOD SPEC: CPT | Performed by: INTERNAL MEDICINE

## 2017-01-12 DIAGNOSIS — I48.20 CHRONIC ATRIAL FIBRILLATION (HCC): ICD-10-CM

## 2017-01-12 DIAGNOSIS — I25.10 CORONARY ARTERY DISEASE INVOLVING NATIVE CORONARY ARTERY OF NATIVE HEART WITHOUT ANGINA PECTORIS: ICD-10-CM

## 2017-01-12 RX ORDER — METOPROLOL SUCCINATE 50 MG/1
50 TABLET, EXTENDED RELEASE ORAL DAILY
Qty: 90 TABLET | Refills: 3 | Status: SHIPPED | OUTPATIENT
Start: 2017-01-12

## 2017-01-16 ENCOUNTER — APPOINTMENT (OUTPATIENT)
Dept: CARDIAC REHAB | Facility: HOSPITAL | Age: 77
End: 2017-01-16

## 2017-01-17 ENCOUNTER — TRANSCRIBE ORDERS (OUTPATIENT)
Dept: PHYSICAL THERAPY | Facility: CLINIC | Age: 77
End: 2017-01-17

## 2017-01-17 ENCOUNTER — TREATMENT (OUTPATIENT)
Dept: PHYSICAL THERAPY | Facility: CLINIC | Age: 77
End: 2017-01-17

## 2017-01-17 DIAGNOSIS — S52.502D CLOSED FRACTURE OF DISTAL END OF LEFT RADIUS WITH ROUTINE HEALING, UNSPECIFIED FRACTURE MORPHOLOGY, SUBSEQUENT ENCOUNTER: Primary | ICD-10-CM

## 2017-01-17 DIAGNOSIS — M79.641 PAIN OF RIGHT HAND: Primary | ICD-10-CM

## 2017-01-17 PROCEDURE — L3808 WHFO, RIGID W/O JOINTS: HCPCS | Performed by: PHYSICAL THERAPIST

## 2017-01-17 NOTE — PROGRESS NOTES
Hand Therapy Splint Evaluation and Plan of Treatment    Patient Name: Juan Diego Toure       Patient MRN: [id number]  : 1940  Physician: Damian Pittman MD  Date: 2017    Encounter Diagnoses   Name Primary?   • Closed fracture of distal end of left radius with routine healing, unspecified fracture morphology, subsequent encounter Yes     Hx: Patient fell on outstretched arm after slipping in socks on a hardwood floor on 2017.  Objective   ROM and Strength: Full composite fist. Wrist AROM NA secondary to recent fracture.   Splinting:  [x] Patient was measured and fit with a forearm based wrist cock up splint  [x] Patient was instructed in wearing schedule, precautions and care of the splint during this visit  [x] Patient was instructed in proper donning/doffing of splint    Assessment:  [x] Patient was fitted and appropriate splint was fabricated this date  [x] Patient reported that splint was comfortable and had no complications with the fit of splint  [x] Patient was instructed and patient verbalized understanding of precautions, wear and care of the splint  [x] Patient demonstrated independent donning and doffing of splint during treatment today    Goals:  [x] Patient was fitted properly with appropriate splint for diagnosis  [x] Patient was educated on precautions, wear schedule and care of the splint  [x] Patient demonstrated independence with donning and doffing of splint  [x] Splint was provide to [x] protect healing structures [] increase ROM [x]  Restrict mobility []  Improve joint alignment      Plan:  [x] No additional treatment is required for this patient at this time. The patient is therefore discharged from therapy.  [x] Patient advised to contact therapist with any additional questions or concerns regarding the fit and function of the splint  [x] Patient will be seen for fitting, adjustments, training in precautions, wear or care of splint    PT SIGNATURE: Sheela Aly  PT   DATE TREATMENT INITIATED: 1/17/2017    Initial Certification    I certify that the therapy services are furnished while this patient is under my care.  The services outlined above are required by this patient, and will be reviewed every 90 days.     PHYSICIAN:      DATE:     Please sign and return via fax to [phone].. Thank you, [facility].

## 2017-01-18 ENCOUNTER — APPOINTMENT (OUTPATIENT)
Dept: CARDIAC REHAB | Facility: HOSPITAL | Age: 77
End: 2017-01-18

## 2017-01-19 ENCOUNTER — HOSPITAL ENCOUNTER (OUTPATIENT)
Dept: CARDIOLOGY | Facility: HOSPITAL | Age: 77
Setting detail: RECURRING SERIES
Discharge: HOME | End: 2017-01-19

## 2017-01-19 PROCEDURE — 85610 PROTHROMBIN TIME: CPT | Performed by: INTERNAL MEDICINE

## 2017-01-19 PROCEDURE — 36416 COLLJ CAPILLARY BLOOD SPEC: CPT | Performed by: INTERNAL MEDICINE

## 2017-01-20 ENCOUNTER — APPOINTMENT (OUTPATIENT)
Dept: CARDIAC REHAB | Facility: HOSPITAL | Age: 77
End: 2017-01-20

## 2017-01-23 ENCOUNTER — APPOINTMENT (OUTPATIENT)
Dept: CARDIAC REHAB | Facility: HOSPITAL | Age: 77
End: 2017-01-23

## 2017-01-25 ENCOUNTER — APPOINTMENT (OUTPATIENT)
Dept: CARDIAC REHAB | Facility: HOSPITAL | Age: 77
End: 2017-01-25

## 2017-01-27 ENCOUNTER — APPOINTMENT (OUTPATIENT)
Dept: CARDIAC REHAB | Facility: HOSPITAL | Age: 77
End: 2017-01-27

## 2017-01-30 ENCOUNTER — APPOINTMENT (OUTPATIENT)
Dept: CARDIAC REHAB | Facility: HOSPITAL | Age: 77
End: 2017-01-30

## 2017-02-01 ENCOUNTER — APPOINTMENT (OUTPATIENT)
Dept: CARDIAC REHAB | Facility: HOSPITAL | Age: 77
End: 2017-02-01

## 2017-02-01 ENCOUNTER — HOSPITAL ENCOUNTER (OUTPATIENT)
Dept: CARDIOLOGY | Facility: HOSPITAL | Age: 77
Setting detail: RECURRING SERIES
Discharge: HOME | End: 2017-02-01

## 2017-02-01 PROCEDURE — 36416 COLLJ CAPILLARY BLOOD SPEC: CPT | Performed by: INTERNAL MEDICINE

## 2017-02-01 PROCEDURE — 85610 PROTHROMBIN TIME: CPT | Performed by: INTERNAL MEDICINE

## 2017-02-03 ENCOUNTER — APPOINTMENT (OUTPATIENT)
Dept: CARDIAC REHAB | Facility: HOSPITAL | Age: 77
End: 2017-02-03

## 2017-02-06 ENCOUNTER — APPOINTMENT (OUTPATIENT)
Dept: CARDIAC REHAB | Facility: HOSPITAL | Age: 77
End: 2017-02-06

## 2017-02-09 ENCOUNTER — HOSPITAL ENCOUNTER (OUTPATIENT)
Dept: CARDIOLOGY | Facility: HOSPITAL | Age: 77
Setting detail: RECURRING SERIES
Discharge: HOME | End: 2017-02-09

## 2017-02-09 PROCEDURE — 85610 PROTHROMBIN TIME: CPT

## 2017-02-09 PROCEDURE — 36416 COLLJ CAPILLARY BLOOD SPEC: CPT

## 2017-02-13 ENCOUNTER — HOSPITAL ENCOUNTER (OUTPATIENT)
Dept: CARDIOLOGY | Facility: HOSPITAL | Age: 77
Setting detail: RECURRING SERIES
Discharge: HOME | End: 2017-02-13

## 2017-02-13 PROCEDURE — 85610 PROTHROMBIN TIME: CPT

## 2017-02-13 PROCEDURE — 36416 COLLJ CAPILLARY BLOOD SPEC: CPT

## 2017-02-16 ENCOUNTER — HOSPITAL ENCOUNTER (OUTPATIENT)
Dept: CARDIOLOGY | Facility: HOSPITAL | Age: 77
Setting detail: RECURRING SERIES
Discharge: HOME | End: 2017-02-16

## 2017-02-16 PROCEDURE — 85610 PROTHROMBIN TIME: CPT

## 2017-02-16 PROCEDURE — 36416 COLLJ CAPILLARY BLOOD SPEC: CPT

## 2017-02-22 ENCOUNTER — HOSPITAL ENCOUNTER (OUTPATIENT)
Dept: CARDIOLOGY | Facility: HOSPITAL | Age: 77
Setting detail: RECURRING SERIES
Discharge: HOME | End: 2017-02-22

## 2017-02-22 PROCEDURE — 36416 COLLJ CAPILLARY BLOOD SPEC: CPT

## 2017-02-22 PROCEDURE — 85610 PROTHROMBIN TIME: CPT

## 2017-02-28 ENCOUNTER — OFFICE VISIT (OUTPATIENT)
Dept: SURGERY | Facility: CLINIC | Age: 77
End: 2017-02-28

## 2017-02-28 VITALS — WEIGHT: 145.94 LBS | HEIGHT: 68 IN | HEART RATE: 70 BPM | BODY MASS INDEX: 22.12 KG/M2 | OXYGEN SATURATION: 95 %

## 2017-02-28 DIAGNOSIS — Z99.2 PERITONEAL DIALYSIS CATHETER IN SITU (HCC): Primary | ICD-10-CM

## 2017-02-28 PROCEDURE — 99213 OFFICE O/P EST LOW 20 MIN: CPT | Performed by: SURGERY

## 2017-02-28 RX ORDER — CEFAZOLIN SODIUM 2 G/100ML
2 INJECTION, SOLUTION INTRAVENOUS ONCE
Status: CANCELLED | OUTPATIENT
Start: 2017-02-28 | End: 2017-02-28

## 2017-02-28 RX ORDER — SODIUM CHLORIDE 0.9 % (FLUSH) 0.9 %
1-10 SYRINGE (ML) INJECTION AS NEEDED
Status: CANCELLED | OUTPATIENT
Start: 2017-02-28

## 2017-02-28 RX ORDER — WARFARIN SODIUM 2.5 MG/1
2.5 TABLET ORAL
COMMUNITY

## 2017-02-28 RX ORDER — B,C/FOLIC/ZINC/SELENOMETH/D3/E 0.8-12.5MG
TABLET ORAL
Refills: 3 | COMMUNITY
Start: 2017-02-03 | End: 2017-03-01

## 2017-03-01 ENCOUNTER — APPOINTMENT (OUTPATIENT)
Dept: PREADMISSION TESTING | Facility: HOSPITAL | Age: 77
End: 2017-03-01

## 2017-03-01 ENCOUNTER — HOSPITAL ENCOUNTER (OUTPATIENT)
Dept: CARDIOLOGY | Facility: HOSPITAL | Age: 77
Setting detail: RECURRING SERIES
Discharge: HOME | End: 2017-03-01

## 2017-03-01 ENCOUNTER — TELEPHONE (OUTPATIENT)
Dept: SURGERY | Facility: CLINIC | Age: 77
End: 2017-03-01

## 2017-03-01 VITALS
DIASTOLIC BLOOD PRESSURE: 58 MMHG | SYSTOLIC BLOOD PRESSURE: 93 MMHG | BODY MASS INDEX: 18.41 KG/M2 | WEIGHT: 121.5 LBS | HEIGHT: 68 IN | TEMPERATURE: 97.1 F | HEART RATE: 69 BPM | RESPIRATION RATE: 16 BRPM | OXYGEN SATURATION: 100 %

## 2017-03-01 LAB
ALBUMIN SERPL-MCNC: 3.3 G/DL (ref 3.5–5.2)
ALBUMIN/GLOB SERPL: 0.7 G/DL
ALP SERPL-CCNC: 344 U/L (ref 39–117)
ALT SERPL W P-5'-P-CCNC: 84 U/L (ref 1–41)
ANION GAP SERPL CALCULATED.3IONS-SCNC: 16.6 MMOL/L
AST SERPL-CCNC: 53 U/L (ref 1–40)
BILIRUB SERPL-MCNC: 1.1 MG/DL (ref 0.1–1.2)
BUN BLD-MCNC: 26 MG/DL (ref 8–23)
BUN/CREAT SERPL: 14.9 (ref 7–25)
CALCIUM SPEC-SCNC: 10.1 MG/DL (ref 8.6–10.5)
CHLORIDE SERPL-SCNC: 91 MMOL/L (ref 98–107)
CO2 SERPL-SCNC: 29.4 MMOL/L (ref 22–29)
CREAT BLD-MCNC: 1.75 MG/DL (ref 0.76–1.27)
DEPRECATED RDW RBC AUTO: 54.5 FL (ref 37–54)
ERYTHROCYTE [DISTWIDTH] IN BLOOD BY AUTOMATED COUNT: 16.5 % (ref 11.5–14.5)
GFR SERPL CREATININE-BSD FRML MDRD: 38 ML/MIN/1.73
GLOBULIN UR ELPH-MCNC: 4.8 GM/DL
GLUCOSE BLD-MCNC: 94 MG/DL (ref 65–99)
HCT VFR BLD AUTO: 34.9 % (ref 40.4–52.2)
HGB BLD-MCNC: 11.1 G/DL (ref 13.7–17.6)
MCH RBC QN AUTO: 29.1 PG (ref 27–32.7)
MCHC RBC AUTO-ENTMCNC: 31.8 G/DL (ref 32.6–36.4)
MCV RBC AUTO: 91.6 FL (ref 79.8–96.2)
PLATELET # BLD AUTO: 291 10*3/MM3 (ref 140–500)
PMV BLD AUTO: 9.4 FL (ref 6–12)
POTASSIUM BLD-SCNC: 3.8 MMOL/L (ref 3.5–5.2)
PROT SERPL-MCNC: 8.1 G/DL (ref 6–8.5)
RBC # BLD AUTO: 3.81 10*6/MM3 (ref 4.6–6)
SODIUM BLD-SCNC: 137 MMOL/L (ref 136–145)
WBC NRBC COR # BLD: 6.92 10*3/MM3 (ref 4.5–10.7)

## 2017-03-01 PROCEDURE — 85610 PROTHROMBIN TIME: CPT

## 2017-03-01 PROCEDURE — 36416 COLLJ CAPILLARY BLOOD SPEC: CPT

## 2017-03-01 RX ORDER — CLOPIDOGREL BISULFATE 75 MG/1
75 TABLET ORAL DAILY
COMMUNITY
End: 2017-01-01

## 2017-03-01 NOTE — PROGRESS NOTES
Cc: Peritoneal dialysis catheter removal    HPI: The patient is a very pleasant 76-year-old male with end-stage renal disease.  He underwent peritoneal dialysis placement by Dr. Gutierrez in 1/2015.  I performed an open umbilical hernia repair on him on 2015.  He is here today because he has been switched to hemodialysis and would not need the peritoneal dialysis catheter anymore.  He has failure of the peritoneal membrane and was unable to clear his blood.  He reports no other complaints.  He was seen by me at the beginning of the year the year because of hemoperitoneum and peritonitis.  At that time he was fully anticoagulated with Plavix and warfarin.  Hemoperitoneum resolved after discontinuation of anticoagulation.  He was restarted on warfarin.  He reports feeling great after starting hemodialysis.    Past Medical History   Diagnosis Date   • Anemia    • Anticoagulated on Coumadin    • Atrial fibrillation    • BPH (benign prostatic hyperplasia)    • COPD (chronic obstructive pulmonary disease)    • Dialysis patient    • ED (erectile dysfunction)    • End stage renal disease    • Hyperlipidemia    • Hypertension    • Prostate cancer      Past Surgical History   Procedure Laterality Date   • Cataract extraction w/ intraocular lens implant Bilateral    • Cardiac catheterization N/A 10/27/2016     Procedure: Coronary angiography;  Surgeon: Jessie Espino MD;  Location: [facility] INVASIVE LOCATION;  Service:    • Cardiac catheterization N/A 10/27/2016     Procedure: Left Heart Cath;  Surgeon: Jessie Espino MD;  Location: [facility] CATH INVASIVE LOCATION;  Service:    • Av fistula placement, brachiocephalic Left 11/24/2014     Ligation of side branches x2 of left arm arteriovenous fistula, peructaneous angioplasty, left arm arteriovenous fistula-Dr. Collin Byers   • Angioplasty Left 11/24/2014     Left arm brachiocephalic arteriovenous fistula creation, Angioplasty of left cephalic vein, 5 mm x 150 mm  balloon-Dr. Collin Byers   • Insertion peritoneal dialysis catheter N/A 01/02/2015     Laparoscopic placement of pertoneal dialysis catheter-Dr. Loi Gutierrez   • Inguinal hernia repair Right 01/02/2015     Open Right Inguinal Hernia repair w/ mesh-Dr. Loi Gutierrez   • Angioplasty Left 04/06/2015     Ligation of side brances x2 of Left Arm Arteriovenous fistula, Percutaneous angioplasty, left arm arteriovenous fistula, Dr. Collin Byers   • Umbilical hernia repair N/A 08/13/2015     Open Umbilical Hernia Repair-Dr. James Fisher   • Prostatectomy N/A      Meds and Allergied reviwed: Taking warfarin daily.     FH and SH: Non-Contributory to the current problem    ROS: A 12 point review of system was performed and there were all negative    Physical exam  Alert oriented ×3 his not any acute distress  Breathing comfortable without any effort  Cardiovascular regular rate and rhythm no murmurs.  Abdomen is soft and nontender.  He is nondistended bowel sounds are positive.  He has a peritoneal dialysis catheter coming out from the left lower quadrant.  He has an umbilical incision that is well-healed and there is no evidence of hernias  There is no evidence of catheter infection    INR is 1.6    Assessment and plan:   The patient is a very pleasant 76-year-old male with end-stage renal disease now on hemodialysis prior on peritoneal dialysis.  He is not on peritoneal dialysis anymore due to filtration membrane failure.  He needs peritoneal dialysis catheter removal    - Recommend performing peritoneal dialysis catheter removal under local anesthesia.  - I recommended to take his warfarin today and to stopped tomorrow on the day after.  He may restart Coumadin after 24 hours from surgery.  I discussed with him about the risk of bleeding that is minimal after this operation so I don't think he should stop the Coumadin for more than 2 days.  Also his INR was 1.6 in the last check.   - Scheduled the patient for open  removal of peritoneal dialysis catheter under monitored anesthesia    James Fisher MD  General, Minimally Invasive and Endoscopic Surgery  [facility]    [address], [address]  [place], [place], [address]  P: [phone]  F: [phone]

## 2017-03-01 NOTE — TELEPHONE ENCOUNTER
PAT called pt on coumadin failed to tell you that.  Pt scheduled for surgery 3/2 hasn't had any since tues?

## 2017-03-02 ENCOUNTER — HOSPITAL ENCOUNTER (OUTPATIENT)
Facility: HOSPITAL | Age: 77
Setting detail: HOSPITAL OUTPATIENT SURGERY
Discharge: HOME | End: 2017-03-02
Attending: SURGERY | Admitting: SURGERY

## 2017-03-02 ENCOUNTER — ANESTHESIA (OUTPATIENT)
Dept: PERIOP | Facility: HOSPITAL | Age: 77
End: 2017-03-02

## 2017-03-02 ENCOUNTER — ANESTHESIA EVENT (OUTPATIENT)
Dept: PERIOP | Facility: HOSPITAL | Age: 77
End: 2017-03-02

## 2017-03-02 VITALS
WEIGHT: 121 LBS | DIASTOLIC BLOOD PRESSURE: 91 MMHG | TEMPERATURE: 98.3 F | RESPIRATION RATE: 16 BRPM | OXYGEN SATURATION: 98 % | BODY MASS INDEX: 18.34 KG/M2 | HEART RATE: 104 BPM | HEIGHT: 68 IN | SYSTOLIC BLOOD PRESSURE: 129 MMHG

## 2017-03-02 DIAGNOSIS — Z99.2 PERITONEAL DIALYSIS CATHETER IN SITU (HCC): ICD-10-CM

## 2017-03-02 LAB
ANION GAP SERPL CALCULATED.3IONS-SCNC: 18.8 MMOL/L
BUN BLD-MCNC: 62 MG/DL (ref 8–23)
BUN/CREAT SERPL: 18.7 (ref 7–25)
CALCIUM SPEC-SCNC: 10 MG/DL (ref 8.6–10.5)
CHLORIDE SERPL-SCNC: 91 MMOL/L (ref 98–107)
CO2 SERPL-SCNC: 28.2 MMOL/L (ref 22–29)
CREAT BLD-MCNC: 3.32 MG/DL (ref 0.76–1.27)
GFR SERPL CREATININE-BSD FRML MDRD: 18 ML/MIN/1.73
GLUCOSE BLD-MCNC: 102 MG/DL (ref 65–99)
INR PPP: 1.62 (ref 0.9–1.1)
POTASSIUM BLD-SCNC: 3.8 MMOL/L (ref 3.5–5.2)
PROTHROMBIN TIME: 18.7 SECONDS (ref 11.7–14.2)
SODIUM BLD-SCNC: 138 MMOL/L (ref 136–145)

## 2017-03-02 PROCEDURE — 49422 REMOVE TUNNELED IP CATH: CPT | Performed by: SURGERY

## 2017-03-02 PROCEDURE — 25010000002 MIDAZOLAM PER 1 MG: Performed by: ANESTHESIOLOGY

## 2017-03-02 PROCEDURE — 25010000002 PROPOFOL 10 MG/ML EMULSION: Performed by: NURSE ANESTHETIST, CERTIFIED REGISTERED

## 2017-03-02 PROCEDURE — 80048 BASIC METABOLIC PNL TOTAL CA: CPT | Performed by: SURGERY

## 2017-03-02 PROCEDURE — 85610 PROTHROMBIN TIME: CPT | Performed by: SURGERY

## 2017-03-02 RX ORDER — MAGNESIUM HYDROXIDE 1200 MG/15ML
LIQUID ORAL AS NEEDED
Status: DISCONTINUED | OUTPATIENT
Start: 2017-03-02 | End: 2017-03-02 | Stop reason: HOSPADM

## 2017-03-02 RX ORDER — SODIUM CHLORIDE 0.9 % (FLUSH) 0.9 %
1-10 SYRINGE (ML) INJECTION AS NEEDED
Status: DISCONTINUED | OUTPATIENT
Start: 2017-03-02 | End: 2017-03-02 | Stop reason: HOSPADM

## 2017-03-02 RX ORDER — CEFAZOLIN SODIUM 2 G/100ML
2 INJECTION, SOLUTION INTRAVENOUS ONCE
Status: DISCONTINUED | OUTPATIENT
Start: 2017-03-02 | End: 2017-03-02 | Stop reason: HOSPADM

## 2017-03-02 RX ORDER — PROMETHAZINE HYDROCHLORIDE 25 MG/ML
12.5 INJECTION, SOLUTION INTRAMUSCULAR; INTRAVENOUS ONCE AS NEEDED
Status: DISCONTINUED | OUTPATIENT
Start: 2017-03-02 | End: 2017-03-02 | Stop reason: HOSPADM

## 2017-03-02 RX ORDER — FLUMAZENIL 0.1 MG/ML
0.2 INJECTION INTRAVENOUS AS NEEDED
Status: DISCONTINUED | OUTPATIENT
Start: 2017-03-02 | End: 2017-03-02 | Stop reason: HOSPADM

## 2017-03-02 RX ORDER — BUPIVACAINE HYDROCHLORIDE AND EPINEPHRINE 5; 5 MG/ML; UG/ML
INJECTION, SOLUTION PERINEURAL AS NEEDED
Status: DISCONTINUED | OUTPATIENT
Start: 2017-03-02 | End: 2017-03-02 | Stop reason: HOSPADM

## 2017-03-02 RX ORDER — PROMETHAZINE HYDROCHLORIDE 25 MG/1
25 SUPPOSITORY RECTAL ONCE AS NEEDED
Status: DISCONTINUED | OUTPATIENT
Start: 2017-03-02 | End: 2017-03-02 | Stop reason: HOSPADM

## 2017-03-02 RX ORDER — HYDROCODONE BITARTRATE AND ACETAMINOPHEN 7.5; 325 MG/1; MG/1
1 TABLET ORAL ONCE AS NEEDED
Status: COMPLETED | OUTPATIENT
Start: 2017-03-02 | End: 2017-03-02

## 2017-03-02 RX ORDER — LABETALOL HYDROCHLORIDE 5 MG/ML
5 INJECTION, SOLUTION INTRAVENOUS
Status: DISCONTINUED | OUTPATIENT
Start: 2017-03-02 | End: 2017-03-02 | Stop reason: HOSPADM

## 2017-03-02 RX ORDER — OXYCODONE AND ACETAMINOPHEN 7.5; 325 MG/1; MG/1
1 TABLET ORAL ONCE AS NEEDED
Status: DISCONTINUED | OUTPATIENT
Start: 2017-03-02 | End: 2017-03-02 | Stop reason: HOSPADM

## 2017-03-02 RX ORDER — MIDAZOLAM HYDROCHLORIDE 1 MG/ML
2 INJECTION INTRAMUSCULAR; INTRAVENOUS
Status: DISCONTINUED | OUTPATIENT
Start: 2017-03-02 | End: 2017-03-02 | Stop reason: HOSPADM

## 2017-03-02 RX ORDER — HYDROMORPHONE HYDROCHLORIDE 1 MG/ML
0.25 INJECTION, SOLUTION INTRAMUSCULAR; INTRAVENOUS; SUBCUTANEOUS
Status: DISCONTINUED | OUTPATIENT
Start: 2017-03-02 | End: 2017-03-02 | Stop reason: HOSPADM

## 2017-03-02 RX ORDER — FENTANYL CITRATE 50 UG/ML
50 INJECTION, SOLUTION INTRAMUSCULAR; INTRAVENOUS
Status: DISCONTINUED | OUTPATIENT
Start: 2017-03-02 | End: 2017-03-02 | Stop reason: HOSPADM

## 2017-03-02 RX ORDER — DIPHENHYDRAMINE HYDROCHLORIDE 50 MG/ML
12.5 INJECTION INTRAMUSCULAR; INTRAVENOUS
Status: DISCONTINUED | OUTPATIENT
Start: 2017-03-02 | End: 2017-03-02 | Stop reason: HOSPADM

## 2017-03-02 RX ORDER — ONDANSETRON 2 MG/ML
4 INJECTION INTRAMUSCULAR; INTRAVENOUS ONCE AS NEEDED
Status: DISCONTINUED | OUTPATIENT
Start: 2017-03-02 | End: 2017-03-02 | Stop reason: HOSPADM

## 2017-03-02 RX ORDER — LIDOCAINE HYDROCHLORIDE 20 MG/ML
INJECTION, SOLUTION INFILTRATION; PERINEURAL AS NEEDED
Status: DISCONTINUED | OUTPATIENT
Start: 2017-03-02 | End: 2017-03-02 | Stop reason: SURG

## 2017-03-02 RX ORDER — PROPOFOL 10 MG/ML
VIAL (ML) INTRAVENOUS CONTINUOUS PRN
Status: DISCONTINUED | OUTPATIENT
Start: 2017-03-02 | End: 2017-03-02 | Stop reason: SURG

## 2017-03-02 RX ORDER — PROPOFOL 10 MG/ML
VIAL (ML) INTRAVENOUS AS NEEDED
Status: DISCONTINUED | OUTPATIENT
Start: 2017-03-02 | End: 2017-03-02 | Stop reason: SURG

## 2017-03-02 RX ORDER — FAMOTIDINE 10 MG/ML
20 INJECTION, SOLUTION INTRAVENOUS
Status: DISCONTINUED | OUTPATIENT
Start: 2017-03-02 | End: 2017-03-02 | Stop reason: HOSPADM

## 2017-03-02 RX ORDER — NALOXONE HCL 0.4 MG/ML
0.2 VIAL (ML) INJECTION AS NEEDED
Status: DISCONTINUED | OUTPATIENT
Start: 2017-03-02 | End: 2017-03-02 | Stop reason: HOSPADM

## 2017-03-02 RX ORDER — SODIUM CHLORIDE 9 MG/ML
9 INJECTION, SOLUTION INTRAVENOUS CONTINUOUS PRN
Status: DISCONTINUED | OUTPATIENT
Start: 2017-03-02 | End: 2017-03-02 | Stop reason: HOSPADM

## 2017-03-02 RX ORDER — MIDAZOLAM HYDROCHLORIDE 1 MG/ML
1 INJECTION INTRAMUSCULAR; INTRAVENOUS
Status: DISCONTINUED | OUTPATIENT
Start: 2017-03-02 | End: 2017-03-02 | Stop reason: HOSPADM

## 2017-03-02 RX ORDER — PROMETHAZINE HYDROCHLORIDE 25 MG/1
25 TABLET ORAL ONCE AS NEEDED
Status: DISCONTINUED | OUTPATIENT
Start: 2017-03-02 | End: 2017-03-02 | Stop reason: HOSPADM

## 2017-03-02 RX ORDER — PROMETHAZINE HYDROCHLORIDE 25 MG/1
12.5 TABLET ORAL ONCE AS NEEDED
Status: DISCONTINUED | OUTPATIENT
Start: 2017-03-02 | End: 2017-03-02 | Stop reason: HOSPADM

## 2017-03-02 RX ORDER — HYDRALAZINE HYDROCHLORIDE 20 MG/ML
5 INJECTION INTRAMUSCULAR; INTRAVENOUS
Status: DISCONTINUED | OUTPATIENT
Start: 2017-03-02 | End: 2017-03-02 | Stop reason: HOSPADM

## 2017-03-02 RX ORDER — HYDROMORPHONE HYDROCHLORIDE 1 MG/ML
0.5 INJECTION, SOLUTION INTRAMUSCULAR; INTRAVENOUS; SUBCUTANEOUS
Status: DISCONTINUED | OUTPATIENT
Start: 2017-03-02 | End: 2017-03-02 | Stop reason: HOSPADM

## 2017-03-02 RX ADMIN — FAMOTIDINE 20 MG: 10 INJECTION, SOLUTION INTRAVENOUS at 13:18

## 2017-03-02 RX ADMIN — PROPOFOL 100 MCG/KG/MIN: 10 INJECTION, EMULSION INTRAVENOUS at 13:39

## 2017-03-02 RX ADMIN — LIDOCAINE HYDROCHLORIDE 100 MG: 20 INJECTION, SOLUTION INFILTRATION; PERINEURAL at 13:39

## 2017-03-02 RX ADMIN — HYDROCODONE BITARTRATE AND ACETAMINOPHEN 1 TABLET: 7.5; 325 TABLET ORAL at 15:01

## 2017-03-02 RX ADMIN — MIDAZOLAM 1 MG: 1 INJECTION INTRAMUSCULAR; INTRAVENOUS at 13:19

## 2017-03-02 RX ADMIN — PROPOFOL 50 MG: 10 INJECTION, EMULSION INTRAVENOUS at 13:39

## 2017-03-02 RX ADMIN — SODIUM CHLORIDE 9 ML/HR: 9 INJECTION, SOLUTION INTRAVENOUS at 13:18

## 2017-03-02 NOTE — PLAN OF CARE
Problem: Patient Care Overview (Adult)  Goal: Plan of Care Review  Outcome: Outcome(s) achieved Date Met:  03/02/17 03/02/17 1545   Coping/Psychosocial Response Interventions   Plan Of Care Reviewed With patient;spouse   Patient Care Overview   Progress progress toward functional goals as expected   Outcome Evaluation   Outcome Summary/Follow up Plan ready for discharge       Goal: Adult Individualization and Mutuality  Outcome: Outcome(s) achieved Date Met:  03/02/17  Goal: Discharge Needs Assessment  Outcome: Outcome(s) achieved Date Met:  03/02/17    Problem: Perioperative Period (Adult)  Goal: Signs and Symptoms of Listed Potential Problems Will be Absent or Manageable (Perioperative Period)  Outcome: Outcome(s) achieved Date Met:  03/02/17 03/02/17 1545   Perioperative Period   Problems Assessed (Perioperative Period) all   Problems Present (Perioperative Period) pain

## 2017-03-02 NOTE — PERIOPERATIVE NURSING NOTE
MD found additional list with Plavix noted.MD unsure if pt on Plavix. Patient and wife asked to clarify if pt still taking Plavix. OK to proceed on with surgery per Dr Fisher.

## 2017-03-02 NOTE — PERIOPERATIVE NURSING NOTE
Informed Dr Fisher of Pt/INR today 18.7 /1.62 and last dose of Plavix 3/1/17. No new orders at this time

## 2017-03-02 NOTE — PLAN OF CARE
Problem: Patient Care Overview (Adult)  Goal: Plan of Care Review  Outcome: Ongoing (interventions implemented as appropriate)    03/02/17 1259   Coping/Psychosocial Response Interventions   Plan Of Care Reviewed With patient   Patient Care Overview   Progress no change       Goal: Adult Individualization and Mutuality  Outcome: Ongoing (interventions implemented as appropriate)    03/02/17 1259   Individualization   Patient Specific Preferences prefers to b called ronan       Goal: Discharge Needs Assessment    03/02/17 1259   Discharge Needs Assessment   Concerns To Be Addressed denies needs/concerns at this time         Problem: Perioperative Period (Adult)  Goal: Signs and Symptoms of Listed Potential Problems Will be Absent or Manageable (Perioperative Period)  Outcome: Ongoing (interventions implemented as appropriate)    03/02/17 1259   Perioperative Period   Problems Assessed (Perioperative Period) wound complications;embolism;hemorrhage;hypothermia;infection;situational response   Problems Present (Perioperative Period) (perititoneal cath)

## 2017-03-02 NOTE — ANESTHESIA POSTPROCEDURE EVALUATION
Patient: Juan Diego Toure    Procedure Summary     Date Anesthesia Start Anesthesia Stop Room / Location    03/02/17 0414 1425  [facility] / [facility]       Procedure Diagnosis Surgeon Provider    REMOVAL PERITONEAL DIALYSIS CATHETER (N/A Abdomen) Peritoneal dialysis catheter in situ  (Peritoneal dialysis catheter in situ [Z99.2]) MD Dagoberto Long MD          Anesthesia Type: MAC  Last vitals  /91 (03/02/17 1520)    Temp 36.8 °C (98.3 °F) (03/02/17 1505)    Pulse 104 (03/02/17 1520)   Resp 16 (03/02/17 1520)    SpO2 98 % (03/02/17 1520)      Post Anesthesia Care and Evaluation    Patient location during evaluation: bedside  Patient participation: complete - patient participated  Level of consciousness: awake  Pain score: 1  Pain management: adequate  Airway patency: patent  Anesthetic complications: No anesthetic complications    Cardiovascular status: acceptable  Respiratory status: acceptable  Hydration status: acceptable

## 2017-03-02 NOTE — H&P (VIEW-ONLY)
Cc: Peritoneal dialysis catheter removal    HPI: The patient is a very pleasant 76-year-old male with end-stage renal disease.  He underwent peritoneal dialysis placement by Dr. Gutierrez in 1/2015.  I performed an open umbilical hernia repair on him on 2015.  He is here today because he has been switched to hemodialysis and would not need the peritoneal dialysis catheter anymore.  He has failure of the peritoneal membrane and was unable to clear his blood.  He reports no other complaints.  He was seen by me at the beginning of the year the year because of hemoperitoneum and peritonitis.  At that time he was fully anticoagulated with Plavix and warfarin.  Hemoperitoneum resolved after discontinuation of anticoagulation.  He was restarted on warfarin.  He reports feeling great after starting hemodialysis.    Past Medical History   Diagnosis Date   • Anemia    • Anticoagulated on Coumadin    • Atrial fibrillation    • BPH (benign prostatic hyperplasia)    • COPD (chronic obstructive pulmonary disease)    • Dialysis patient    • ED (erectile dysfunction)    • End stage renal disease    • Hyperlipidemia    • Hypertension    • Prostate cancer      Past Surgical History   Procedure Laterality Date   • Cataract extraction w/ intraocular lens implant Bilateral    • Cardiac catheterization N/A 10/27/2016     Procedure: Coronary angiography;  Surgeon: Jessie Espino MD;  Location: [facility] INVASIVE LOCATION;  Service:    • Cardiac catheterization N/A 10/27/2016     Procedure: Left Heart Cath;  Surgeon: Jessie Espino MD;  Location: [facility] CATH INVASIVE LOCATION;  Service:    • Av fistula placement, brachiocephalic Left 11/24/2014     Ligation of side branches x2 of left arm arteriovenous fistula, peructaneous angioplasty, left arm arteriovenous fistula-Dr. Collin Byers   • Angioplasty Left 11/24/2014     Left arm brachiocephalic arteriovenous fistula creation, Angioplasty of left cephalic vein, 5 mm x 150 mm  balloon-Dr. Collin Byers   • Insertion peritoneal dialysis catheter N/A 01/02/2015     Laparoscopic placement of pertoneal dialysis catheter-Dr. Loi Gutierrez   • Inguinal hernia repair Right 01/02/2015     Open Right Inguinal Hernia repair w/ mesh-Dr. Loi Gutierrez   • Angioplasty Left 04/06/2015     Ligation of side brances x2 of Left Arm Arteriovenous fistula, Percutaneous angioplasty, left arm arteriovenous fistula, Dr. Collin Byers   • Umbilical hernia repair N/A 08/13/2015     Open Umbilical Hernia Repair-Dr. James Fisher   • Prostatectomy N/A      Meds and Allergied reviwed: Taking warfarin daily.     FH and SH: Non-Contributory to the current problem    ROS: A 12 point review of system was performed and there were all negative    Physical exam  Alert oriented ×3 his not any acute distress  Breathing comfortable without any effort  Cardiovascular regular rate and rhythm no murmurs.  Abdomen is soft and nontender.  He is nondistended bowel sounds are positive.  He has a peritoneal dialysis catheter coming out from the left lower quadrant.  He has an umbilical incision that is well-healed and there is no evidence of hernias  There is no evidence of catheter infection    INR is 1.6    Assessment and plan:   The patient is a very pleasant 76-year-old male with end-stage renal disease now on hemodialysis prior on peritoneal dialysis.  He is not on peritoneal dialysis anymore due to filtration membrane failure.  He needs peritoneal dialysis catheter removal    - Recommend performing peritoneal dialysis catheter removal under local anesthesia.  - I recommended to take his warfarin today and to stopped tomorrow on the day after.  He may restart Coumadin after 24 hours from surgery.  I discussed with him about the risk of bleeding that is minimal after this operation so I don't think he should stop the Coumadin for more than 2 days.  Also his INR was 1.6 in the last check.   - Scheduled the patient for open  removal of peritoneal dialysis catheter under monitored anesthesia    James Fisher MD  General, Minimally Invasive and Endoscopic Surgery  [facility]    [address], [address]  [place], [place], [address]  P: [phone]  F: [phone]

## 2017-03-02 NOTE — BRIEF OP NOTE
REMOVAL PERITONEAL DIALYSIS CATHETER  Procedure Note    Juan Diego Toure  3/2/2017    Pre-op Diagnosis:   Peritoneal dialysis catheter in situ [Z99.2]    Post-op Diagnosis:     Post-Op Diagnosis Codes:     * Peritoneal dialysis catheter in situ [Z99.2]    Procedure/CPT® Codes:      Procedure(s):  REMOVAL PERITONEAL DIALYSIS CATHETER    Surgeon(s):  James Fisher MD    Anesthesia: Monitor Anesthesia Care    Staff:   Circulator: Bennett Neumann RN  Scrub Person: Sadie Vincent    Estimated Blood Loss: * No values recorded between 3/2/2017  1:33 PM and 3/2/2017  2:12 PM *  Urine Voided: * No values recorded between 3/2/2017  1:33 PM and 3/2/2017  2:12 PM *    Specimens:                None      Drains:   None    Findings: Well incorporated PD catheter    Complications: None      James Fisher MD     Date: 3/2/2017  Time: 2:16 PM

## 2017-03-02 NOTE — OP NOTE
PREOPERATIVE DIAGNOSIS:  Peritoneal dialysis in situ  POSTOPERATIVE DIAGNOSIS:  Same  PROCEDURE:  Open removal of peritoneal dialysis catheter  SURGEON/STAFF:  ARON Fisher  ANESTHESIA:  General.   BLOOD LOSS: Minimal  COUNTS:  Needle and sponge count correct.  SPECIMENS:  PD catheter    INDICATIONS FOR OPERATION:  76 y.o. year old male who presented to my office for PD catheter removal.  I offered open removal.   The risks and benefits of open, conservative and laparoscopic management were discussed at length and in detail with the patient.  she has chosen to undergo open removal.  PD on warfarin that was stopped 2 days ago.    OPERATION:  The patient was brought to the operating room in stable condition.   Preoperative antibiotics were given and sequential compression devices were applied.  At this time, the patient was laid supine on the operating room table.  General anesthesia was induced by the Anesthesia service without difficulty.  The patient's abdomen was prepped and draped in the usual sterile fashion.      At this time, half percent Marcaine with epinephrine was injected left lateral to the umbilical area. A 2 cm incision was performed with scalpel. Dissection was carried down to the muscular fascia. The PD catheter was found and dissected circumferentially. The distal cuff was found and dissected. The distal aspect was removed and the proximal cuff was dissected free of adhesions. There was an area of bleeding at the fascia that was controlled with electrocoagulation and figure of eight 0 Vicryl. Bleeding stopped.   The catheter was removed. The rectus muscle fascia was closed with interrupted single 0- Vicryl sutures. The wound was then closed in 2 layers with 3-0 Vicryl and 4-0 Monocryl. The wound was covered with steri-strips. The catheter exit site was covered with 4x4 gauze and tegaderm.      The patient tolerated the procedure well.  Instrument and sponge count were correct.    The patient was  extubated in the recovery room in stable condition.  TERESITA, the attending surgeon, performed the entire operation.    James Fisher MD  General, Minimally Invasive and Endoscopic Surgery  [facility]

## 2017-03-02 NOTE — DISCHARGE INSTRUCTIONS
You were given 1 Norco tablet for pain at 03:01 pm    Outpatient Surgery Guidelines, Adult  Outpatient procedures are those for which the person having the procedure is allowed to go home the same day as the procedure. Various procedures are done on an outpatient basis. You should follow some general guidelines if you will be having an outpatient procedure.  AFTER THE  PROCEDURE  After surgery, you will be taken to a recovery area, where your progress will be monitored. If there are no complications, you will be allowed to go home when you are awake, stable, and taking fluids well. You may have numbness around the surgical site. Healing will take some time. You will have tenderness at the surgical site and may have some swelling and bruising. You may also have some nausea.  HOME CARE INSTRUCTIONS  · Do not drive for 24 hours, or as directed by your health care provider. Do not drive while taking prescription pain medicines.  · Do not drink alcohol for 24 hours.  · Do not make important decisions or sign legal documents for 24 hours.  · Plan on having a responsible adult stay with your for 24 hours following your procedure.  · You may resume a normal diet and activities as directed.  · Do not lift anything heavier than 10 pounds (4.5 kg) or play contact sports until your health care provider says it is okay.  · Only take over-the-counter or prescription medicines as directed by your health care provider.  · Follow up with your health care provider as directed.  SEEK MEDICAL CARE IF:  · You have increased bleeding (more than a small spot) from the surgical site.  · You have redness, swelling, or increasing pain in the wound.  · You see pus coming from the wound.  · You have a fever > 101.  · You notice a bad smell coming from the wound or dressing.  · You feel lightheaded or faint.  · You develop a rash.  · You have trouble breathing.  · You develop allergies.  MAKE SURE YOU:  · Understand these instructions.  · Will  watch your condition.  · Will get help right away if you are not doing well or get worse.    SEDATION DISCHARGE INSTRUCTIONS.    IMPORTANT: The following information will help you return to your best level of health.  Sedation.  You have had a procedure that called for some medicine to reduce anxiety and pain. This medicine (or medicines) is called  sedation. After receiving the medicine, you may be sleepy, but able to breathe on your own. The effects of the medicine may last for several hours.    Follow these instructions after sedation:   Go right home. Rest quietly at home today, then you can be up and about.   Do not drink alcohol, drive or operate machinery for 24 hours.   Do not do anything where light-headedness or clumsiness would be dangerous.   Do not make important decisions or sign any legal papers for the next 24 hours.   Make sure A RESPONSIBLE PERSON stays with you the rest of today and overnight for your protection and safety.   Start your diet with fluids and light foods (jello, soup, juice, toast). Then, slowly progress to your usual diet if you are not sick to your stomach.    Call your doctor if you have:   a gray or blue skin color.   excess sleepiness.   repeated vomiting.   trouble breathing.   any new problems or concerns.

## 2017-03-02 NOTE — ANESTHESIA PREPROCEDURE EVALUATION
Anesthesia Evaluation     Patient summary reviewed   NPO Status: > 8 hours   Airway   Mallampati: II  Neck ROM: full  no difficulty expected  Dental    (+) edentulous    Pulmonary    (+) COPD,   Cardiovascular     Rhythm: irregular    (+) hypertension, dysrhythmias Atrial Fib,       Neuro/Psych  GI/Hepatic/Renal/Endo    (+)  chronic renal disease,     Musculoskeletal     Abdominal    Substance History      OB/GYN          Other          Other Comment: Hb 8.3                              Anesthesia Plan    ASA 3     MAC     intravenous induction   Anesthetic plan and risks discussed with patient.

## 2017-03-08 ENCOUNTER — HOSPITAL ENCOUNTER (OUTPATIENT)
Dept: CARDIOLOGY | Facility: HOSPITAL | Age: 77
Setting detail: RECURRING SERIES
Discharge: HOME | End: 2017-03-08

## 2017-03-08 ENCOUNTER — OFFICE VISIT (OUTPATIENT)
Dept: CARDIOLOGY | Facility: CLINIC | Age: 77
End: 2017-03-08

## 2017-03-08 VITALS
HEART RATE: 103 BPM | WEIGHT: 120 LBS | DIASTOLIC BLOOD PRESSURE: 58 MMHG | BODY MASS INDEX: 18.19 KG/M2 | HEIGHT: 68 IN | SYSTOLIC BLOOD PRESSURE: 92 MMHG

## 2017-03-08 DIAGNOSIS — J43.9 PULMONARY EMPHYSEMA, UNSPECIFIED EMPHYSEMA TYPE (HCC): ICD-10-CM

## 2017-03-08 DIAGNOSIS — I48.20 CHRONIC ATRIAL FIBRILLATION (HCC): Primary | ICD-10-CM

## 2017-03-08 DIAGNOSIS — I25.10 CORONARY ARTERY DISEASE INVOLVING NATIVE CORONARY ARTERY OF NATIVE HEART WITHOUT ANGINA PECTORIS: ICD-10-CM

## 2017-03-08 DIAGNOSIS — E78.49 OTHER HYPERLIPIDEMIA: ICD-10-CM

## 2017-03-08 DIAGNOSIS — I10 ESSENTIAL HYPERTENSION: ICD-10-CM

## 2017-03-08 DIAGNOSIS — N18.5 CHRONIC KIDNEY DISEASE, STAGE 5 (HCC): ICD-10-CM

## 2017-03-08 PROCEDURE — 93000 ELECTROCARDIOGRAM COMPLETE: CPT | Performed by: INTERNAL MEDICINE

## 2017-03-08 PROCEDURE — 36416 COLLJ CAPILLARY BLOOD SPEC: CPT

## 2017-03-08 PROCEDURE — 85610 PROTHROMBIN TIME: CPT

## 2017-03-08 PROCEDURE — 99214 OFFICE O/P EST MOD 30 MIN: CPT | Performed by: INTERNAL MEDICINE

## 2017-03-08 NOTE — PROGRESS NOTES
Subjective:     Encounter Date:03/08/2017      Patient ID: Juan Diego Toure is a 76 y.o. male.    Chief Complaint:  History of Present Illness     The patient is a 76-year-old male with a history of hypertension, ESRD, on dialysis, mild COPD, persistent atrial fibrillation, mild cardiomyopathy, coronary artery disease, status post drug-eluting stent placement to his proximal left circumflex artery, who presents for followup. I saw the patient initially for dyspnea in 04/2016. He reported that his dyspnea symptoms were pretty stable at that time. We started with an echocardiogram that showed mildly depressed EF with 41%, mildly reduced right ventricular systolic function, moderate-to-severe MR, and mild TR. He was noted to be in atrial fibrillation at that time. I ended up starting him on anticoagulation with warfarin because of his elevated [id number] score. Because of his MR, he ended up pursuing a transesophageal echocardiogram that was performed on 05/02/2016 that showed that this MR was actually more than mild-to-moderate range. He had difficulty with rate control. We started him on carvedilol which developed low blood pressures. Ended up being changed to metoprolol succinate, which again we had difficulty titrating because of borderline blood pressures.     When I saw him in followup in 09/2016, he was complaining of more issues of dyspnea on exertion and fatigue. He was noted to be anemic and after discussion with his nephrologist ended up receiving transfusion. At that time the patient was getting peritoneal dialysis. He felt better following his transfusion but continued to have significant dyspnea on exertion. At that point proceeded with a stress test that showed ischemia of his inferolateral wall in 10/2016. We ended up proceeding with a cardiac catheterization on 10/27/2016 that showed nonobstructive disease of his LAD and right coronary artery and a 99% proximal left circumflex stenosis. He  underwent drug-eluting stent placement at that time with a Xience Alpine 3.5 x 15 mm stent. Following that he had improvement of his dyspnea on exertion and since then has gone back to his prior baseline.     Since that time the patient has been on both clopidogrel and chronic warfarin therapy. He developed supratherapeutic INR and noted to have peritoneal bleeding when he was evaluated by Dr. Fisher. At that time his warfarin was held and he had resolution of his peritoneal bleed. His warfarin was eventually resumed. The patient reports that in the last couple of months it was noted that his peritoneal dialysis was failing and was told that he would need to start on hemodialysis. About 6 weeks ago he was started on hemodialysis on Monday, Wednesday and Friday. He had his peritoneal dialysis catheter removed last week by Dr. Fisher. He reports that he had significant weight loss and appetite loss during the time that his dialysis was not working. He reports that since being on hemodialysis he feels better overall and has had slow improvement in his appetite. He denies any significant bleeding issues recently. His blood pressures he reports have been staying fairly low, especially on days where he goes to dialysis. He was told today at dialysis that his fistula did not appear to have very good flows and that he would need to follow up with Dr. Byers regarding this. He otherwise denies any chest pain, palpitations, lower extremity edema, dizziness or lightheadedness, PND or orthopnea, presyncope or syncope. He continues to have some mild shortness of breath that he reports is at his baseline.         Review of Systems   Constitution: Positive for weakness, malaise/fatigue and weight loss.   HENT: Negative for headaches, hearing loss, hoarse voice, nosebleeds and sore throat.    Eyes: Negative for pain.   Cardiovascular: Positive for dyspnea on exertion. Negative for chest pain, claudication, cyanosis, irregular  heartbeat, leg swelling, near-syncope, orthopnea, palpitations, paroxysmal nocturnal dyspnea and syncope.   Respiratory: Negative for shortness of breath and snoring.    Endocrine: Negative for cold intolerance, heat intolerance, polydipsia, polyphagia and polyuria.   Skin: Negative for itching and rash.   Musculoskeletal: Negative for arthritis, falls, joint pain, joint swelling, muscle cramps, muscle weakness and myalgias.   Gastrointestinal: Negative for constipation, diarrhea, dysphagia, heartburn, hematemesis, hematochezia, melena, nausea and vomiting.   Genitourinary: Negative for frequency, hematuria and hesitancy.   Neurological: Negative for excessive daytime sleepiness, dizziness, light-headedness and numbness.   Psychiatric/Behavioral: Negative for depression. The patient is not nervous/anxious.           Current Outpatient Prescriptions:   •  acetaminophen (TYLENOL) 500 MG tablet, Take 500 mg by mouth every 6 (six) hours as needed for mild pain (1-3)., Disp: , Rfl:   •  atorvastatin (LIPITOR) 40 MG tablet, Take 1 tablet by mouth Every Night., Disp: 30 tablet, Rfl: 11  •  Cholecalciferol (VITAMIN D3) 5000 UNITS capsule capsule, Take 5,000 Units by mouth Daily., Disp: , Rfl:   •  clopidogrel (PLAVIX) 75 MG tablet, Take 75 mg by mouth Daily. PT INSTRUCTED TO HOLD FOR SURGERY, LAST DOSE 2/28/17, Disp: , Rfl:   •  folic acid (FOLVITE) 1 MG tablet, Take 1 mg by mouth daily., Disp: , Rfl:   •  metoprolol succinate XL (TOPROL-XL) 50 MG 24 hr tablet, Take 1 tablet by mouth Daily. (Patient taking differently: Take 50 mg by mouth Every Morning.), Disp: 90 tablet, Rfl: 3  •  Multiple Vitamins-Minerals (RENAPLEX-D PO), Take 1 tablet/day by mouth Daily., Disp: , Rfl:   •  sodium bicarbonate 650 MG tablet, Take 650 mg by mouth 2 (Two) Times a Day., Disp: , Rfl:   •  Umeclidinium Bromide (INCRUSE ELLIPTA) 62.5 MCG/INH aerosol powder , Inhale 1 puff As Needed., Disp: , Rfl:   •  warfarin (COUMADIN) 2.5 MG tablet, Take  2.5 mg by mouth Daily. PT HOLDING FOR SURGERY, Disp: , Rfl:     Past Medical History   Diagnosis Date   • Anemia    • Anticoagulated on Coumadin    • Atrial fibrillation    • BPH (benign prostatic hyperplasia)    • COPD (chronic obstructive pulmonary disease)    • Dialysis patient    • ED (erectile dysfunction)    • End stage renal disease    • Hyperlipidemia    • Hypertension    • Prostate cancer      Past Surgical History   Procedure Laterality Date   • Cataract extraction w/ intraocular lens implant Bilateral    • Cardiac catheterization N/A 10/27/2016     Procedure: Coronary angiography;  Surgeon: Jessie Espino MD;  Location: [facility] CATH INVASIVE LOCATION;  Service:    • Cardiac catheterization N/A 10/27/2016     Procedure: Left Heart Cath;  Surgeon: Jessie Espino MD;  Location: [facility] CATH INVASIVE LOCATION;  Service:    • Av fistula placement, brachiocephalic Left 11/24/2014     Ligation of side branches x2 of left arm arteriovenous fistula, peructaneous angioplasty, left arm arteriovenous fistula-Dr. Collni Byers   • Angioplasty Left 11/24/2014     Left arm brachiocephalic arteriovenous fistula creation, Angioplasty of left cephalic vein, 5 mm x 150 mm balloon-Dr. Collin Byers   • Insertion peritoneal dialysis catheter N/A 01/02/2015     Laparoscopic placement of pertoneal dialysis catheter-Dr. Loi Gutierrez   • Inguinal hernia repair Right 01/02/2015     Open Right Inguinal Hernia repair w/ mesh-Dr. Loi Gutierrez   • Angioplasty Left 04/06/2015     Ligation of side brances x2 of Left Arm Arteriovenous fistula, Percutaneous angioplasty, left arm arteriovenous fistula, Dr. Collin Byers   • Umbilical hernia repair N/A 08/13/2015     Open Umbilical Hernia Repair-Dr. James Fisher   • Prostatectomy N/A    • Removal peritoneal dialysis catheter N/A 3/2/2017     Procedure: REMOVAL PERITONEAL DIALYSIS CATHETER;  Surgeon: James Fisher MD;  Location: [facility] OR;  Service:      Family  "History   Problem Relation Age of Onset   • Heart attack Mother    • Heart disease Mother    • Heart attack Father    • Heart disease Father    • No Known Problems Sister    • No Known Problems Maternal Grandmother    • No Known Problems Maternal Grandfather    • No Known Problems Paternal Grandmother    • No Known Problems Paternal Grandfather    • No Known Problems Sister      Social History   Substance Use Topics   • Smoking status: Former Smoker     Packs/day: 1.50   • Smokeless tobacco: None      Comment: quit 15 years ago   • Alcohol use 1.2 oz/week     2 Cans of beer per week      Comment: SELDOMLY           ECG 12 Lead  Date/Time: 3/8/2017 7:39 PM  Performed by: DOMINICK FOWLER  Authorized by: DOMINICK FOWLER   Comparison: compared with previous ECG   Similar to previous ECG  Rhythm: atrial fibrillation  Conduction: LAFB  Other findings: PRWP               Objective:         Visit Vitals   • BP 92/58 (BP Location: Right arm, Patient Position: Sitting)   • Pulse 103   • Ht 68\" (172.7 cm)   • Wt 120 lb (54.4 kg)   • BMI 18.25 kg/m2          Physical Exam   Constitutional: He is oriented to person, place, and time. He appears cachectic.   HENT:   Head: Normocephalic and atraumatic.   Eyes: Conjunctivae, EOM and lids are normal. Pupils are equal, round, and reactive to light.   Neck: Normal range of motion and full passive range of motion without pain. Neck supple. No JVD present. Carotid bruit is not present.   Cardiovascular: Normal rate, S1 normal and S2 normal.  An irregularly irregular rhythm present. Exam reveals no S3 and no S4.    No murmur heard.  Pulses:       Radial pulses are 2+ on the right side, and 2+ on the left side.   No bilateral lower extremity edema   Pulmonary/Chest: Effort normal and breath sounds normal. He has no rales.   Abdominal: Soft. Normal appearance. He exhibits no ascites. There is no hepatomegaly.   Lymphadenopathy:     He has no cervical adenopathy.   Neurological: He is alert " and oriented to person, place, and time.   Skin: Skin is warm, dry and intact.   Psychiatric: He has a normal mood and affect.       Lab Review:       Assessment:          Diagnosis Plan   1. Chronic atrial fibrillation     2. Essential hypertension     3. Other hyperlipidemia     4. Coronary artery disease involving native coronary artery of native heart without angina pectoris     5. Pulmonary emphysema, unspecified emphysema type     6. Chronic kidney disease, stage 5            Plan:          1. Hypotension.   He seems relatively asymptomatic with this.   I am hesitant to decrease his metoprolol because of his difficult to control rates.  However, it is unclear if this slow flow issue that he has had with his fistula during dialysis could be related to hypotension.   I am going to back off on his metoprolol succinate to 25 mg a day and see if this will help at all with this situation.   I have also encouraged him to go ahead and followup with Dr. Godinez, his vascular surgeon.     2. Chronic atrial fibrillation. His rate still is borderline controlled.  We will have to see how he does with backing off on his metoprolol succinate.     3. Coronary artery disease. He  is on antiplatelet therapy with Clopidogrel only because of him being on chronic warfarin. Ideally, I would like to keep him on the Clopidogrel in addition to the warfarin for a year.  If he develops any further bleeding issues, I would be okay with stopping the Clopidogrel six months after his stent placement.   Continue his Atorvastatin and beta blocker therapy.     4. Dyslipidemia. He is on Atorvastatin for this.     5. Mild cardiomyopathy.   The ejection fraction in the past was 40-45%.  We will need a repeat echocardiogram in the near future.     6. COPD.     7. ESRD.   He is now on hemodialysis.     8. PVCs.        We will plan on seeing the patient back again in two months.       Atrial Fibrillation and Atrial Flutter  Assessment  • The patient  has permanent atrial fibrillation  • This is non-valvular in etiology  • The patient's CHADS2-VASc score is 4  • A [id number] score of 2 or more is considered a high risk for a thromboembolic event  • Warfarin prescribed    Plan  • Continue in atrial fibrillation with rate control  • Continue warfarin for antithrombotic therapy, bleeding issues discussed  • Continue beta blocker for rhythm control    Coronary Artery Disease  Assessment  • The patient has no angina    Plan  • Lifestyle modifications discussed include adhering to a heart healthy diet, avoidance of tobacco products, maintenance of a healthy weight, medication compliance, regular exercise and regular monitoring of cholesterol and blood pressure    Subjective - Objective  • There has been a previous stent procedure using BAILEE on or around 10/27/2016  • Current antiplatelet therapy includes aspirin 81 mg and clopidogrel 75 mg  • The patient qualifies for cardiac rehabilitation, and has completed a cardiac rehab program        Heart Failure  Assessment  • NYHA class I - There is no limitation of physical activity. Physical activity does not cause fatigue, palpitations or shortness of breath.  • ACE inhibitor not prescribed for medical reasons  • Beta blocker prescribed  • Diuretics not prescribed for medical reasons  • Angiotensin receptor blocker (ARB) not prescribed for medical reasons  • Calcium channel blockers not prescribed  • Left ventricular function is mildly reduced by qualitative assessment    Plan  • The heart failure care plan was discussed with the patient today including: continuing the current program    Subjective/Objective    • Physical exam findings negative for rales, peripheral edema, elevated JVP, S3 gallop, S4 gallop, hepatomegaly and ascites.

## 2017-03-14 ENCOUNTER — OFFICE VISIT (OUTPATIENT)
Dept: SURGERY | Facility: CLINIC | Age: 77
End: 2017-03-14

## 2017-03-14 DIAGNOSIS — Z09 POSTOP CHECK: Primary | ICD-10-CM

## 2017-03-14 PROCEDURE — 99024 POSTOP FOLLOW-UP VISIT: CPT | Performed by: SURGERY

## 2017-03-14 NOTE — PROGRESS NOTES
Cc: Postop check after peritoneal dialysis catheter removal     S: Reports no complaints.  Abdomen incision healing fine.  Complains of fourth finger of his right hand ingrowth naIL with pain     O: Abdomen is soft, nontender, nondistended, incisions are healing fine without any evidence of infection.  There is no evidence of hernias.  Ingrowth nail on the fourth finger of the right hand.  There is no evidence of infection    A/P: Patient is a very pleasant 76-year-old male status post peritoneal dialysis catheter removal.  He has been doing fine and denies any major complaints.  He has an ingrown nail that has been causing him pain.    - Follow-up to primary care or podiatrist for ingrown nail related issues  - No need to follow-up with me unless having problems with incisions     James Fisher MD  General, Minimally Invasive and Endoscopic Surgery  [facility]    [address], [address]  [place], [place], [address]  P: [phone]  F: [phone]

## 2017-03-15 ENCOUNTER — HOSPITAL ENCOUNTER (OUTPATIENT)
Dept: CARDIOLOGY | Facility: HOSPITAL | Age: 77
Setting detail: RECURRING SERIES
Discharge: HOME | End: 2017-03-15

## 2017-03-15 PROCEDURE — 36416 COLLJ CAPILLARY BLOOD SPEC: CPT

## 2017-03-15 PROCEDURE — 85610 PROTHROMBIN TIME: CPT

## 2017-03-22 ENCOUNTER — HOSPITAL ENCOUNTER (OUTPATIENT)
Dept: CARDIOLOGY | Facility: HOSPITAL | Age: 77
Setting detail: RECURRING SERIES
Discharge: HOME | End: 2017-03-22

## 2017-03-22 PROCEDURE — 36416 COLLJ CAPILLARY BLOOD SPEC: CPT

## 2017-03-22 PROCEDURE — 85610 PROTHROMBIN TIME: CPT

## 2017-03-29 ENCOUNTER — HOSPITAL ENCOUNTER (OUTPATIENT)
Dept: CARDIOLOGY | Facility: HOSPITAL | Age: 77
Setting detail: RECURRING SERIES
Discharge: HOME | End: 2017-03-29

## 2017-03-29 PROCEDURE — 85610 PROTHROMBIN TIME: CPT

## 2017-03-29 PROCEDURE — 36416 COLLJ CAPILLARY BLOOD SPEC: CPT

## 2017-04-12 ENCOUNTER — HOSPITAL ENCOUNTER (OUTPATIENT)
Dept: CARDIOLOGY | Facility: HOSPITAL | Age: 77
Setting detail: RECURRING SERIES
Discharge: HOME | End: 2017-04-12

## 2017-04-12 PROCEDURE — 36416 COLLJ CAPILLARY BLOOD SPEC: CPT

## 2017-04-12 PROCEDURE — 85610 PROTHROMBIN TIME: CPT

## 2017-04-17 DIAGNOSIS — I25.10 CORONARY ARTERY DISEASE INVOLVING NATIVE CORONARY ARTERY OF NATIVE HEART WITHOUT ANGINA PECTORIS: ICD-10-CM

## 2017-04-17 DIAGNOSIS — I48.20 CHRONIC ATRIAL FIBRILLATION (HCC): ICD-10-CM

## 2017-04-18 RX ORDER — METOPROLOL SUCCINATE 50 MG/1
TABLET, EXTENDED RELEASE ORAL
Qty: 90 TABLET | Refills: 0 | Status: SHIPPED | OUTPATIENT
Start: 2017-04-18 | End: 2017-01-01 | Stop reason: SDUPTHER

## 2017-05-10 ENCOUNTER — HOSPITAL ENCOUNTER (OUTPATIENT)
Dept: CARDIOLOGY | Facility: HOSPITAL | Age: 77
Setting detail: RECURRING SERIES
Discharge: HOME | End: 2017-05-10

## 2017-05-10 ENCOUNTER — OFFICE VISIT (OUTPATIENT)
Dept: CARDIOLOGY | Facility: CLINIC | Age: 77
End: 2017-05-10

## 2017-05-10 VITALS
DIASTOLIC BLOOD PRESSURE: 70 MMHG | HEART RATE: 95 BPM | SYSTOLIC BLOOD PRESSURE: 92 MMHG | HEIGHT: 66 IN | WEIGHT: 124 LBS | BODY MASS INDEX: 19.93 KG/M2

## 2017-05-10 DIAGNOSIS — Z99.2 HEMODIALYSIS PATIENT (HCC): ICD-10-CM

## 2017-05-10 DIAGNOSIS — J43.9 PULMONARY EMPHYSEMA, UNSPECIFIED EMPHYSEMA TYPE (HCC): ICD-10-CM

## 2017-05-10 DIAGNOSIS — N18.5 CHRONIC KIDNEY DISEASE, STAGE 5 (HCC): ICD-10-CM

## 2017-05-10 DIAGNOSIS — I42.9 CARDIOMYOPATHY (HCC): ICD-10-CM

## 2017-05-10 DIAGNOSIS — I10 ESSENTIAL HYPERTENSION: ICD-10-CM

## 2017-05-10 DIAGNOSIS — I48.20 CHRONIC ATRIAL FIBRILLATION (HCC): Primary | ICD-10-CM

## 2017-05-10 DIAGNOSIS — E78.49 OTHER HYPERLIPIDEMIA: ICD-10-CM

## 2017-05-10 DIAGNOSIS — I25.10 CORONARY ARTERY DISEASE INVOLVING NATIVE CORONARY ARTERY OF NATIVE HEART WITHOUT ANGINA PECTORIS: ICD-10-CM

## 2017-05-10 PROCEDURE — 99214 OFFICE O/P EST MOD 30 MIN: CPT | Performed by: INTERNAL MEDICINE

## 2017-05-10 PROCEDURE — 36416 COLLJ CAPILLARY BLOOD SPEC: CPT

## 2017-05-10 PROCEDURE — 93000 ELECTROCARDIOGRAM COMPLETE: CPT | Performed by: INTERNAL MEDICINE

## 2017-05-10 PROCEDURE — 85610 PROTHROMBIN TIME: CPT

## 2017-05-30 ENCOUNTER — OFFICE VISIT (OUTPATIENT)
Dept: FAMILY MEDICINE CLINIC | Facility: CLINIC | Age: 77
End: 2017-05-30

## 2017-05-30 VITALS
DIASTOLIC BLOOD PRESSURE: 68 MMHG | HEIGHT: 66 IN | HEART RATE: 90 BPM | RESPIRATION RATE: 16 BRPM | WEIGHT: 124 LBS | BODY MASS INDEX: 19.93 KG/M2 | TEMPERATURE: 98.4 F | SYSTOLIC BLOOD PRESSURE: 110 MMHG

## 2017-05-30 DIAGNOSIS — N18.5 CHRONIC KIDNEY DISEASE, STAGE 5 (HCC): ICD-10-CM

## 2017-05-30 DIAGNOSIS — Z79.01 ON WARFARIN THERAPY: ICD-10-CM

## 2017-05-30 DIAGNOSIS — L03.012 PARONYCHIA, LEFT: Primary | ICD-10-CM

## 2017-05-30 DIAGNOSIS — H00.015 HORDEOLUM EXTERNUM OF LEFT LOWER EYELID: ICD-10-CM

## 2017-05-30 PROCEDURE — 99213 OFFICE O/P EST LOW 20 MIN: CPT | Performed by: PHYSICIAN ASSISTANT

## 2017-05-30 RX ORDER — CEPHALEXIN 500 MG/1
500 CAPSULE ORAL 2 TIMES DAILY
Qty: 14 CAPSULE | Refills: 0 | Status: SHIPPED | OUTPATIENT
Start: 2017-05-30 | End: 2017-01-01

## 2017-06-07 ENCOUNTER — HOSPITAL ENCOUNTER (OUTPATIENT)
Dept: CARDIOLOGY | Facility: HOSPITAL | Age: 77
Setting detail: RECURRING SERIES
Discharge: HOME | End: 2017-06-07

## 2017-06-07 PROCEDURE — 85610 PROTHROMBIN TIME: CPT

## 2017-06-07 PROCEDURE — 36416 COLLJ CAPILLARY BLOOD SPEC: CPT

## 2017-06-14 DIAGNOSIS — I48.91 ATRIAL FIBRILLATION (HCC): ICD-10-CM

## 2017-06-15 RX ORDER — WARFARIN SODIUM 2.5 MG/1
TABLET ORAL
Qty: 30 TABLET | Refills: 0 | Status: SHIPPED | OUTPATIENT
Start: 2017-06-15 | End: 2017-01-01 | Stop reason: SDUPTHER

## 2017-06-15 RX ORDER — WARFARIN SODIUM 5 MG/1
TABLET ORAL
Qty: 30 TABLET | Refills: 1 | OUTPATIENT
Start: 2017-06-15

## 2017-10-18 NOTE — TELEPHONE ENCOUNTER
[facility] calling to see if pt is taking Plavix and Warfarin? Called and informed [facility] pt is currently taking both meds previously tolerated. Thanks, Ming

## 2017-11-14 PROBLEM — Z95.5 S/P DRUG ELUTING CORONARY STENT PLACEMENT: Status: ACTIVE | Noted: 2017-01-01

## 2017-11-14 NOTE — PROGRESS NOTES
Subjective:     Encounter Date:11/14/2017      Patient ID: Juan Diego Toure is a 77 y.o. male.    Chief Complaint:  History of Present Illness    This is a 77-year-old male with a history of hypertension, ESRD on hemodialysis, COPD, chronic atrial fibrillation, mild cardiopathy myopathy within last known EF of 41%, mild-to-moderate mitral regurgitation, coronary artery disease status post drug-eluting stent placement of his left circumflex artery, who presents for follow-up.    I began following the patient in 4/2016 when he presented with complaints of dyspnea on exertion.  At the time he reported that his symptoms were fairly stable.  We started with an echocardiogram that did show evidence of a mildly depressed left ventricle systolic function with an ejection fraction of 41%, mildly reduced right ventricular systolic function with moderate to severe mitral regurgitation and mild tricuspid regurgitation.  He is to be in atrial for ablation at that time which was a new diagnosis.  We ended up starting him on anticoagulation with warfarin.  I also set him up for a transesophageal echocardiogram in 5/2016 to further evaluate his mitral regurgitation that ended up appearing to be more in the mild-to-moderate range.    In follow-up the patient continued to have issues with dyspnea on exertion that were now progressing.  Initially there was concern that his persistent anemia was the cause of his symptoms.  He underwent blood transfusion during his routine peritoneal dialysis sessions but despite improvement in his anemia he continued have significant dyspnea.  At that point we proceeded with a stress test that did show evidence of inferolateral ischemia.  He subsequently was taken to the cardiac catheterization laboratory on 10/27/2016 and was found to have a subtotal stenosis was proximal left circumflex artery for which she underwent drug-eluting stent placement.  His remaining coronary arteries were normal.  He  did have improvement of his dyspnea on exertion following his PCI back to his baseline.    In follow-up the patient developed issues with peritoneal dialysis and ended up being transitioned back to hemodialysis.  With that he initially did much better with less weight loss and feeling better overall.  At that time my main issue was trying to get good rate control without bottoming out his blood pressures.  This seemed to be an issue especially during dialysis.  I did at decreasing his dosage of metoprolol succinate due to his low blood pressures we were able to maintain some fairly well controlled heart rates.  Following his stent placement he has been maintained on clopidogrel and warfarin without any significant bleeding issues except for issues with his peritoneal dialysis catheter in the past when his INR was supratherapeutic.    Today he presents for routine follow-up.  He is also set up for a repeat echocardiogram.  Overall he and his wife believes that he is doing better.  His wife believes that his breathing has been a lot better lately.  They just followed up with his pulmonologist who feels like his pulmonary function tests are back to normal.  His weight is gone up a little bit.  He denies any chest pain, palpitations, PND or orthopnea, presyncope or syncope, or lower extremity edema.  He's not had any recent bleeding issues.      Review of Systems   Constitution: Negative for weakness and malaise/fatigue.   HENT: Negative for hearing loss, hoarse voice, nosebleeds and sore throat.    Eyes: Negative for pain.   Cardiovascular: Negative for chest pain, claudication, cyanosis, dyspnea on exertion, irregular heartbeat, leg swelling, near-syncope, orthopnea, palpitations, paroxysmal nocturnal dyspnea and syncope.   Respiratory: Negative for shortness of breath and snoring.    Endocrine: Negative for cold intolerance, heat intolerance, polydipsia, polyphagia and polyuria.   Skin: Negative for itching and rash.    Musculoskeletal: Negative for arthritis, falls, joint pain, joint swelling, muscle cramps, muscle weakness and myalgias.   Gastrointestinal: Negative for constipation, diarrhea, dysphagia, heartburn, hematemesis, hematochezia, melena, nausea and vomiting.   Genitourinary: Negative for frequency, hematuria and hesitancy.   Neurological: Negative for excessive daytime sleepiness, dizziness, headaches, light-headedness and numbness.   Psychiatric/Behavioral: Negative for depression. The patient is not nervous/anxious.           Current Outpatient Prescriptions:   •  acetaminophen (TYLENOL) 500 MG tablet, Take 500 mg by mouth every 6 (six) hours as needed for mild pain (1-3)., Disp: , Rfl:   •  atorvastatin (LIPITOR) 40 MG tablet, TAKE 1 TABLET BY MOUTH EVERY NIGHT., Disp: 30 tablet, Rfl: 0  •  clopidogrel (PLAVIX) 75 MG tablet, Take 75 mg by mouth Daily. PT INSTRUCTED TO HOLD FOR SURGERY, LAST DOSE 2/28/17, Disp: , Rfl:   •  clopidogrel (PLAVIX) 75 MG tablet, TAKE 1 TABLET BY MOUTH DAILY., Disp: 30 tablet, Rfl: 5  •  folic acid (FOLVITE) 1 MG tablet, Take 1 mg by mouth daily., Disp: , Rfl:   •  metoprolol succinate XL (TOPROL-XL) 50 MG 24 hr tablet, Take 1 tablet by mouth Daily. (Patient taking differently: Take 50 mg by mouth Every Morning.), Disp: 90 tablet, Rfl: 3  •  Multiple Vitamins-Minerals (RENAPLEX-D PO), Take 1 tablet/day by mouth Daily., Disp: , Rfl:   •  sodium bicarbonate 650 MG tablet, Take 650 mg by mouth 2 (Two) Times a Day., Disp: , Rfl:   •  Umeclidinium Bromide (INCRUSE ELLIPTA) 62.5 MCG/INH aerosol powder , Inhale 1 puff As Needed., Disp: , Rfl:   •  warfarin (COUMADIN) 2.5 MG tablet, Take 2.5 mg by mouth Daily. PT HOLDING FOR SURGERY, Disp: , Rfl:   •  warfarin (COUMADIN) 2.5 MG tablet, TAKE 1 TABLET BY MOUTH ON MONDAY & FRIDAY AND 1/2 TABLET ON ALL OTHER DAYS OR AS DIRECTED, Disp: 30 tablet, Rfl: 0  •  warfarin (COUMADIN) 5 MG tablet, TAKE ONE TABLET ON TUESDAY AND SATURDAY. TAKE 1/2 TABLET ALL  OTHER DAYS OR AS DIRECTED, Disp: 30 tablet, Rfl: 1    Past Medical History:   Diagnosis Date   • Anemia    • Anticoagulated on Coumadin    • Atrial fibrillation    • BPH (benign prostatic hyperplasia)    • CAD (coronary artery disease)    • CKD (chronic kidney disease)    • COPD (chronic obstructive pulmonary disease)    • Dialysis patient    • KENNEDY (dyspnea on exertion)    • ED (erectile dysfunction)    • End stage renal disease    • Fatigue    • Hyperlipidemia    • Hypertension    • Hypotension    • Prostate cancer    • Pulmonary emphysema    • Weakness    • Weight loss          Past Surgical History:   Procedure Laterality Date   • ANGIOPLASTY Left 11/24/2014    Left arm brachiocephalic arteriovenous fistula creation, Angioplasty of left cephalic vein, 5 mm x 150 mm balloon-Dr. Collin Byers   • ANGIOPLASTY Left 04/06/2015    Ligation of side brances x2 of Left Arm Arteriovenous fistula, Percutaneous angioplasty, left arm arteriovenous fistula, Dr. Collin Byres   • AV FISTULA PLACEMENT, BRACHIOCEPHALIC Left 11/24/2014    Ligation of side branches x2 of left arm arteriovenous fistula, peructaneous angioplasty, left arm arteriovenous fistula-Dr. Collin Byers   • CARDIAC CATHETERIZATION N/A 10/27/2016    Procedure: Coronary angiography;  Surgeon: Jessie Espino MD;  Location:  [facility];  Service:    • CARDIAC CATHETERIZATION N/A 10/27/2016    Procedure: Left Heart Cath;  Surgeon: Jessie Espino MD;  Location:  [facility];  Service:    • CATARACT EXTRACTION W/ INTRAOCULAR LENS IMPLANT Bilateral    • INGUINAL HERNIA REPAIR Right 01/02/2015    Open Right Inguinal Hernia repair w/ mesh-Dr. Loi Gutierrez   • INSERTION PERITONEAL DIALYSIS CATHETER N/A 01/02/2015    Laparoscopic placement of pertoneal dialysis catheter-Dr. Loi Gutierrez   • PROSTATECTOMY N/A    • REMOVAL PERITONEAL DIALYSIS CATHETER N/A 3/2/2017    Procedure: REMOVAL PERITONEAL DIALYSIS CATHETER;  Surgeon:  "James Fisher MD;  Location: [facility];  Service:    • UMBILICAL HERNIA REPAIR N/A 08/13/2015    Open Umbilical Hernia Repair-Dr. James Fisher     Family History   Problem Relation Age of Onset   • Heart attack Mother    • Heart disease Mother    • Heart attack Father    • Heart disease Father    • No Known Problems Sister    • No Known Problems Maternal Grandmother    • No Known Problems Maternal Grandfather    • No Known Problems Paternal Grandmother    • No Known Problems Paternal Grandfather    • No Known Problems Sister      Social History   Substance Use Topics   • Smoking status: Former Smoker     Packs/day: 1.50   • Smokeless tobacco: Never Used      Comment: quit 15 years ago   • Alcohol use 1.2 oz/week     2 Cans of beer per week      Comment: SELDOMLY           ECG 12 Lead  Date/Time: 11/14/2017 4:33 PM  Performed by: DOMINICK FOWLER  Authorized by: DOMINICK FOWLER   Comparison: compared with previous ECG   Comparison to previous ECG: PVC is new  Rhythm: atrial fibrillation  Ectopy: unifocal PVCs  Conduction: incomplete RBBB and LAFB  Other findings: PRWP               Objective:         Visit Vitals   • /64 (BP Location: Right arm, Patient Position: Sitting)   • Pulse 86   • Ht 68\" (172.7 cm)   • Wt 129 lb (58.5 kg)   • BMI 19.61 kg/m2          Physical Exam   Constitutional: He is oriented to person, place, and time. He appears well-developed and well-nourished.   HENT:   Head: Normocephalic and atraumatic.   Eyes: Conjunctivae, EOM and lids are normal. Pupils are equal, round, and reactive to light.   Neck: Normal range of motion and full passive range of motion without pain. Neck supple. No JVD present. Carotid bruit is not present.   Cardiovascular: Normal rate, S1 normal and S2 normal.  An irregularly irregular rhythm present. Exam reveals no gallop.    No murmur heard.  Pulses:       Radial pulses are 2+ on the right side, and 2+ on the left side.   No bilateral lower " extremity edema   Pulmonary/Chest: Effort normal and breath sounds normal.   Abdominal: Soft. Normal appearance.   Lymphadenopathy:     He has no cervical adenopathy.   Neurological: He is alert and oriented to person, place, and time.   Skin: Skin is warm, dry and intact.   Psychiatric: He has a normal mood and affect.       Lab Review:       Assessment:          Diagnosis Plan   1. Coronary artery disease involving native coronary artery of native heart without angina pectoris  Lipid Panel    Hepatic Function Panel   2. S/P drug eluting coronary stent placement     3. Chronic atrial fibrillation     4. Other hyperlipidemia  Hepatic Function Panel   5. Essential hypertension     6. Pulmonary emphysema, unspecified emphysema type     7. Stage 5 chronic kidney disease on chronic dialysis     8. Hemodialysis patient            Plan:       1.  Coronary artery disease.  This has been over a year since his drug loading stent placement I am going to have him stop his clopidogrel.  He will replace this with aspirin 81 mg.  Otherwise continue his current management including atorvastatin and metoprolol succinate.  2.  Chronic atrial fibrillation.  Rate control looks good today on his current dose of metoprolol succinate.  He is on chronic warfarin therapy for his CHADS-VASc score of 4.  3.  Cardiomyopathy.  Last ejection fraction was around 40-45%.  Since then he has had better rate control and has been revascularized.  He has a repeat echocardiogram performed today.  4.  Dyslipidemia.  He is on atorvastatin which is followed by Dr. Castelan.  5.  COPD  6.  ESRD on hemodialysis  7.  PVC's.  Asymptomatic.    Will call and discuss results of echocardiogram and determine follow up based on those results.

## 2018-01-01 ENCOUNTER — APPOINTMENT (OUTPATIENT)
Dept: GENERAL RADIOLOGY | Facility: HOSPITAL | Age: 78
End: 2018-01-01

## 2018-01-01 ENCOUNTER — OFFICE VISIT (OUTPATIENT)
Dept: CARDIOLOGY | Facility: CLINIC | Age: 78
End: 2018-01-01

## 2018-01-01 ENCOUNTER — HOSPITAL ENCOUNTER (OUTPATIENT)
Dept: INFUSION THERAPY | Facility: HOSPITAL | Age: 78
Discharge: HOME | End: 2018-01-18
Attending: INTERNAL MEDICINE | Admitting: INTERNAL MEDICINE

## 2018-01-01 ENCOUNTER — APPOINTMENT (OUTPATIENT)
Dept: ULTRASOUND IMAGING | Facility: HOSPITAL | Age: 78
End: 2018-01-01

## 2018-01-01 ENCOUNTER — APPOINTMENT (OUTPATIENT)
Dept: CARDIOLOGY | Facility: HOSPITAL | Age: 78
End: 2018-01-01
Attending: INTERNAL MEDICINE

## 2018-01-01 ENCOUNTER — TRANSCRIBE ORDERS (OUTPATIENT)
Dept: ADMINISTRATIVE | Facility: HOSPITAL | Age: 78
End: 2018-01-01

## 2018-01-01 ENCOUNTER — ANESTHESIA (OUTPATIENT)
Dept: GASTROENTEROLOGY | Facility: HOSPITAL | Age: 78
End: 2018-01-01

## 2018-01-01 ENCOUNTER — TELEPHONE (OUTPATIENT)
Dept: CARDIOLOGY | Facility: HOSPITAL | Age: 78
End: 2018-01-01

## 2018-01-01 ENCOUNTER — ANESTHESIA EVENT (OUTPATIENT)
Dept: GASTROENTEROLOGY | Facility: HOSPITAL | Age: 78
End: 2018-01-01

## 2018-01-01 ENCOUNTER — HOSPITAL ENCOUNTER (INPATIENT)
Facility: HOSPITAL | Age: 78
LOS: 3 days | End: 2018-07-05
Attending: EMERGENCY MEDICINE | Admitting: INTERNAL MEDICINE

## 2018-01-01 VITALS
OXYGEN SATURATION: 97 % | RESPIRATION RATE: 20 BRPM | TEMPERATURE: 97.5 F | WEIGHT: 129.63 LBS | SYSTOLIC BLOOD PRESSURE: 92 MMHG | DIASTOLIC BLOOD PRESSURE: 63 MMHG | HEART RATE: 116 BPM | HEIGHT: 67 IN | BODY MASS INDEX: 20.35 KG/M2

## 2018-01-01 VITALS
WEIGHT: 125 LBS | HEIGHT: 68 IN | HEART RATE: 66 BPM | BODY MASS INDEX: 18.94 KG/M2 | DIASTOLIC BLOOD PRESSURE: 76 MMHG | TEMPERATURE: 97.8 F | OXYGEN SATURATION: 98 % | SYSTOLIC BLOOD PRESSURE: 131 MMHG | RESPIRATION RATE: 20 BRPM

## 2018-01-01 VITALS
WEIGHT: 132 LBS | HEART RATE: 84 BPM | SYSTOLIC BLOOD PRESSURE: 120 MMHG | BODY MASS INDEX: 20.72 KG/M2 | DIASTOLIC BLOOD PRESSURE: 64 MMHG | HEIGHT: 67 IN

## 2018-01-01 DIAGNOSIS — D68.32 WARFARIN-INDUCED COAGULOPATHY (HCC): ICD-10-CM

## 2018-01-01 DIAGNOSIS — T45.515A WARFARIN-INDUCED COAGULOPATHY (HCC): ICD-10-CM

## 2018-01-01 DIAGNOSIS — I48.91 ATRIAL FIBRILLATION (HCC): ICD-10-CM

## 2018-01-01 DIAGNOSIS — Z99.2 ANEMIA IN CHRONIC KIDNEY DISEASE, ON CHRONIC DIALYSIS (HCC): Primary | ICD-10-CM

## 2018-01-01 DIAGNOSIS — K62.5 RECTAL BLEEDING: Primary | ICD-10-CM

## 2018-01-01 DIAGNOSIS — Z99.2 STAGE 5 CHRONIC KIDNEY DISEASE ON CHRONIC DIALYSIS (HCC): ICD-10-CM

## 2018-01-01 DIAGNOSIS — D62 ACUTE BLOOD LOSS ANEMIA: ICD-10-CM

## 2018-01-01 DIAGNOSIS — N18.6 STAGE 5 CHRONIC KIDNEY DISEASE ON CHRONIC DIALYSIS (HCC): ICD-10-CM

## 2018-01-01 DIAGNOSIS — I25.10 CORONARY ARTERY DISEASE INVOLVING NATIVE CORONARY ARTERY OF NATIVE HEART WITHOUT ANGINA PECTORIS: Primary | ICD-10-CM

## 2018-01-01 DIAGNOSIS — Z99.2 HEMODIALYSIS PATIENT (HCC): ICD-10-CM

## 2018-01-01 DIAGNOSIS — Z95.5 S/P DRUG ELUTING CORONARY STENT PLACEMENT: ICD-10-CM

## 2018-01-01 DIAGNOSIS — I42.9 CARDIOMYOPATHY, UNSPECIFIED TYPE (HCC): ICD-10-CM

## 2018-01-01 DIAGNOSIS — D63.1 ANEMIA IN CHRONIC KIDNEY DISEASE, ON CHRONIC DIALYSIS (HCC): Primary | ICD-10-CM

## 2018-01-01 DIAGNOSIS — J43.9 PULMONARY EMPHYSEMA, UNSPECIFIED EMPHYSEMA TYPE (HCC): ICD-10-CM

## 2018-01-01 DIAGNOSIS — I48.91 ATRIAL FIBRILLATION WITH RAPID VENTRICULAR RESPONSE (HCC): ICD-10-CM

## 2018-01-01 DIAGNOSIS — I10 ESSENTIAL HYPERTENSION: ICD-10-CM

## 2018-01-01 DIAGNOSIS — N18.6 END STAGE RENAL DISEASE (HCC): ICD-10-CM

## 2018-01-01 DIAGNOSIS — D63.1 ANEMIA IN END-STAGE RENAL DISEASE (HCC): Primary | ICD-10-CM

## 2018-01-01 DIAGNOSIS — D69.6 THROMBOCYTOPENIA (HCC): ICD-10-CM

## 2018-01-01 DIAGNOSIS — E78.49 OTHER HYPERLIPIDEMIA: ICD-10-CM

## 2018-01-01 DIAGNOSIS — N18.6 ANEMIA IN CHRONIC KIDNEY DISEASE, ON CHRONIC DIALYSIS (HCC): Primary | ICD-10-CM

## 2018-01-01 DIAGNOSIS — I95.9 SYMPTOMATIC HYPOTENSION: ICD-10-CM

## 2018-01-01 DIAGNOSIS — J90 PLEURAL EFFUSION, RIGHT: ICD-10-CM

## 2018-01-01 DIAGNOSIS — R06.09 DYSPNEA ON EXERTION: ICD-10-CM

## 2018-01-01 DIAGNOSIS — I48.20 CHRONIC ATRIAL FIBRILLATION (HCC): ICD-10-CM

## 2018-01-01 DIAGNOSIS — N18.6 ANEMIA IN END-STAGE RENAL DISEASE (HCC): Primary | ICD-10-CM

## 2018-01-01 LAB
ABO + RH BLD: NORMAL
ABO GROUP BLD: NORMAL
ABO GROUP BLD: NORMAL
ALBUMIN FLD-MCNC: 1.8 G/DL
ALBUMIN SERPL-MCNC: 2.5 G/DL (ref 3.5–5.2)
ALBUMIN SERPL-MCNC: 3.1 G/DL (ref 3.5–5.2)
ALBUMIN SERPL-MCNC: 3.5 G/DL (ref 3.5–5.2)
ALBUMIN/GLOB SERPL: 0.9 G/DL
ALBUMIN/GLOB SERPL: 1.3 G/DL
ALP SERPL-CCNC: 54 U/L (ref 39–117)
ALP SERPL-CCNC: 76 U/L (ref 39–117)
ALT SERPL W P-5'-P-CCNC: 12 U/L (ref 1–41)
ALT SERPL W P-5'-P-CCNC: 15 U/L (ref 1–41)
ANION GAP SERPL CALCULATED.3IONS-SCNC: 15 MMOL/L
ANION GAP SERPL CALCULATED.3IONS-SCNC: 15 MMOL/L
ANION GAP SERPL CALCULATED.3IONS-SCNC: 17.8 MMOL/L
ANION GAP SERPL CALCULATED.3IONS-SCNC: 22.8 MMOL/L
ANISOCYTOSIS BLD QL: NORMAL
APPEARANCE FLD: ABNORMAL
ARTERIAL PATENCY WRIST A: ABNORMAL
AST SERPL-CCNC: 10 U/L (ref 1–40)
AST SERPL-CCNC: 35 U/L (ref 1–40)
ATMOSPHERIC PRESS: 755.2 MMHG
BACTERIA BLD CULT: ABNORMAL
BASE EXCESS BLDA CALC-SCNC: -2.7 MMOL/L (ref 0–2)
BASOPHILS # BLD AUTO: 0.02 10*3/MM3 (ref 0–0.2)
BASOPHILS # BLD AUTO: 0.04 10*3/MM3 (ref 0–0.2)
BASOPHILS # BLD AUTO: 0.06 10*3/MM3 (ref 0–0.2)
BASOPHILS NFR BLD AUTO: 0.3 % (ref 0–1.5)
BASOPHILS NFR BLD AUTO: 0.8 % (ref 0–1.5)
BASOPHILS NFR BLD AUTO: 1.3 % (ref 0–1.5)
BDY SITE: ABNORMAL
BH BB BLOOD EXPIRATION DATE: NORMAL
BH BB BLOOD TYPE BARCODE: 5100
BH BB DISPENSE STATUS: NORMAL
BH BB PRODUCT CODE: NORMAL
BH BB UNIT NUMBER: NORMAL
BH CV ECHO MEAS - ACS: 1.1 CM
BH CV ECHO MEAS - AO MEAN PG (FULL): 4 MMHG
BH CV ECHO MEAS - AO MEAN PG: 5 MMHG
BH CV ECHO MEAS - AO ROOT AREA (BSA CORRECTED): 1.7
BH CV ECHO MEAS - AO ROOT AREA: 6.2 CM^2
BH CV ECHO MEAS - AO ROOT DIAM: 2.8 CM
BH CV ECHO MEAS - AO V2 MAX: 154 CM/SEC
BH CV ECHO MEAS - AO V2 MEAN: 105 CM/SEC
BH CV ECHO MEAS - AO V2 VTI: 19.9 CM
BH CV ECHO MEAS - AVA(I,A): 1.9 CM^2
BH CV ECHO MEAS - AVA(I,D): 1.9 CM^2
BH CV ECHO MEAS - BSA(HAYCOCK): 1.7 M^2
BH CV ECHO MEAS - BSA: 1.7 M^2
BH CV ECHO MEAS - BZI_BMI: 20.2 KILOGRAMS/M^2
BH CV ECHO MEAS - BZI_METRIC_HEIGHT: 170.2 CM
BH CV ECHO MEAS - BZI_METRIC_WEIGHT: 58.5 KG
BH CV ECHO MEAS - CONTRAST EF 4CH: 28.2 ML/M^2
BH CV ECHO MEAS - EDV(CUBED): 103.8 ML
BH CV ECHO MEAS - EDV(MOD-SP4): 85 ML
BH CV ECHO MEAS - EDV(TEICH): 102.4 ML
BH CV ECHO MEAS - EF(CUBED): 28.6 %
BH CV ECHO MEAS - EF(MOD-SP4): 28.2 %
BH CV ECHO MEAS - EF(TEICH): 23.2 %
BH CV ECHO MEAS - ESV(CUBED): 74.1 ML
BH CV ECHO MEAS - ESV(MOD-SP4): 61 ML
BH CV ECHO MEAS - ESV(TEICH): 78.6 ML
BH CV ECHO MEAS - FS: 10.6 %
BH CV ECHO MEAS - IVS/LVPW: 1
BH CV ECHO MEAS - IVSD: 0.8 CM
BH CV ECHO MEAS - LAT PEAK E' VEL: 3 CM/SEC
BH CV ECHO MEAS - LV DIASTOLIC VOL/BSA (35-75): 50.6 ML/M^2
BH CV ECHO MEAS - LV MASS(C)D: 122.3 GRAMS
BH CV ECHO MEAS - LV MASS(C)DI: 72.8 GRAMS/M^2
BH CV ECHO MEAS - LV MEAN PG: 1 MMHG
BH CV ECHO MEAS - LV SYSTOLIC VOL/BSA (12-30): 36.3 ML/M^2
BH CV ECHO MEAS - LV V1 MAX: 85 CM/SEC
BH CV ECHO MEAS - LV V1 MEAN: 54.5 CM/SEC
BH CV ECHO MEAS - LV V1 VTI: 12 CM
BH CV ECHO MEAS - LVIDD: 4.7 CM
BH CV ECHO MEAS - LVIDS: 4.2 CM
BH CV ECHO MEAS - LVLD AP4: 6.9 CM
BH CV ECHO MEAS - LVLS AP4: 6.1 CM
BH CV ECHO MEAS - LVOT AREA (M): 3.1 CM^2
BH CV ECHO MEAS - LVOT AREA: 3.1 CM^2
BH CV ECHO MEAS - LVOT DIAM: 2 CM
BH CV ECHO MEAS - LVPWD: 0.8 CM
BH CV ECHO MEAS - MED PEAK E' VEL: 3 CM/SEC
BH CV ECHO MEAS - MR MAX PG: 50.6 MMHG
BH CV ECHO MEAS - MR MAX VEL: 354.5 CM/SEC
BH CV ECHO MEAS - MV DEC SLOPE: 479 CM/SEC^2
BH CV ECHO MEAS - MV DEC TIME: 0.09 SEC
BH CV ECHO MEAS - MV E MAX VEL: 86.9 CM/SEC
BH CV ECHO MEAS - MV MEAN PG: 2 MMHG
BH CV ECHO MEAS - MV P1/2T MAX VEL: 116 CM/SEC
BH CV ECHO MEAS - MV P1/2T: 70.9 MSEC
BH CV ECHO MEAS - MV V2 MEAN: 52.8 CM/SEC
BH CV ECHO MEAS - MV V2 VTI: 16.7 CM
BH CV ECHO MEAS - MVA P1/2T LCG: 1.9 CM^2
BH CV ECHO MEAS - MVA(P1/2T): 3.1 CM^2
BH CV ECHO MEAS - MVA(VTI): 2.3 CM^2
BH CV ECHO MEAS - PA ACC SLOPE: 40.6 CM/SEC^2
BH CV ECHO MEAS - PA ACC TIME: 0.07 SEC
BH CV ECHO MEAS - PA MAX PG (FULL): 4.7 MMHG
BH CV ECHO MEAS - PA MAX PG: 5.9 MMHG
BH CV ECHO MEAS - PA PR(ACCEL): 48.9 MMHG
BH CV ECHO MEAS - PA V2 MAX: 121 CM/SEC
BH CV ECHO MEAS - PI END-D VEL: 96.4 CM/SEC
BH CV ECHO MEAS - PVA(V,A): 1.7 CM^2
BH CV ECHO MEAS - PVA(V,D): 1.7 CM^2
BH CV ECHO MEAS - QP/QS: 0.73
BH CV ECHO MEAS - RAP SYSTOLE: 3 MMHG
BH CV ECHO MEAS - RV MAX PG: 1.1 MMHG
BH CV ECHO MEAS - RV MEAN PG: 1 MMHG
BH CV ECHO MEAS - RV V1 MAX: 53.3 CM/SEC
BH CV ECHO MEAS - RV V1 MEAN: 32.1 CM/SEC
BH CV ECHO MEAS - RV V1 VTI: 7.2 CM
BH CV ECHO MEAS - RVOT AREA: 3.8 CM^2
BH CV ECHO MEAS - RVOT DIAM: 2.2 CM
BH CV ECHO MEAS - SI(AO): 73 ML/M^2
BH CV ECHO MEAS - SI(CUBED): 17.7 ML/M^2
BH CV ECHO MEAS - SI(LVOT): 22.5 ML/M^2
BH CV ECHO MEAS - SI(MOD-SP4): 14.3 ML/M^2
BH CV ECHO MEAS - SI(TEICH): 14.2 ML/M^2
BH CV ECHO MEAS - SV(AO): 122.5 ML
BH CV ECHO MEAS - SV(CUBED): 29.7 ML
BH CV ECHO MEAS - SV(LVOT): 37.7 ML
BH CV ECHO MEAS - SV(MOD-SP4): 24 ML
BH CV ECHO MEAS - SV(RVOT): 27.4 ML
BH CV ECHO MEAS - SV(TEICH): 23.8 ML
BH CV ECHO MEAS - TAPSE (>1.6): 0.7 CM2
BH CV ECHO MEAS - TR MAX VEL: 405 CM/SEC
BH CV ECHO MEASUREMENTS AVERAGE E/E' RATIO: 28.97
BH CV VAS BP RIGHT ARM: NORMAL MMHG
BH CV XLRA - RV BASE: 4.5 CM
BH CV XLRA - TDI S': 5 CM/SEC
BILIRUB SERPL-MCNC: 0.7 MG/DL (ref 0.1–1.2)
BILIRUB SERPL-MCNC: 1.2 MG/DL (ref 0.1–1.2)
BLD GP AB SCN SERPL QL: NEGATIVE
BLD GP AB SCN SERPL QL: NEGATIVE
BUN BLD-MCNC: 33 MG/DL (ref 8–23)
BUN BLD-MCNC: 45 MG/DL (ref 8–23)
BUN BLD-MCNC: 55 MG/DL (ref 8–23)
BUN BLD-MCNC: 66 MG/DL (ref 8–23)
BUN/CREAT SERPL: 7.9 (ref 7–25)
BUN/CREAT SERPL: 8.8 (ref 7–25)
BUN/CREAT SERPL: 9.2 (ref 7–25)
BUN/CREAT SERPL: 9.3 (ref 7–25)
CALCIUM SPEC-SCNC: 7.8 MG/DL (ref 8.6–10.5)
CALCIUM SPEC-SCNC: 8.3 MG/DL (ref 8.6–10.5)
CALCIUM SPEC-SCNC: 8.4 MG/DL (ref 8.6–10.5)
CALCIUM SPEC-SCNC: 8.6 MG/DL (ref 8.6–10.5)
CHLORIDE SERPL-SCNC: 100 MMOL/L (ref 98–107)
CHLORIDE SERPL-SCNC: 94 MMOL/L (ref 98–107)
CHLORIDE SERPL-SCNC: 95 MMOL/L (ref 98–107)
CHLORIDE SERPL-SCNC: 96 MMOL/L (ref 98–107)
CK MB SERPL-CCNC: 3.15 NG/ML
CK SERPL-CCNC: 248 U/L (ref 20–200)
CO2 SERPL-SCNC: 17.2 MMOL/L (ref 22–29)
CO2 SERPL-SCNC: 25 MMOL/L (ref 22–29)
CO2 SERPL-SCNC: 25 MMOL/L (ref 22–29)
CO2 SERPL-SCNC: 25.2 MMOL/L (ref 22–29)
COLOR FLD: YELLOW
CREAT BLD-MCNC: 4.18 MG/DL (ref 0.76–1.27)
CREAT BLD-MCNC: 4.89 MG/DL (ref 0.76–1.27)
CREAT BLD-MCNC: 6.28 MG/DL (ref 0.76–1.27)
CREAT BLD-MCNC: 7.06 MG/DL (ref 0.76–1.27)
D-LACTATE SERPL-SCNC: 1.4 MMOL/L (ref 0.5–2)
D-LACTATE SERPL-SCNC: 2.4 MMOL/L (ref 0.5–2)
D-LACTATE SERPL-SCNC: 2.5 MMOL/L (ref 0.5–2)
D-LACTATE SERPL-SCNC: 2.5 MMOL/L (ref 0.5–2)
D-LACTATE SERPL-SCNC: 2.6 MMOL/L (ref 0.5–2)
D-LACTATE SERPL-SCNC: 2.9 MMOL/L (ref 0.5–2)
DEPRECATED RDW RBC AUTO: 59.5 FL (ref 37–54)
DEPRECATED RDW RBC AUTO: 61.4 FL (ref 37–54)
DEPRECATED RDW RBC AUTO: 61.7 FL (ref 37–54)
DEPRECATED RDW RBC AUTO: 63.3 FL (ref 37–54)
DEPRECATED RDW RBC AUTO: 63.3 FL (ref 37–54)
DEPRECATED RDW RBC AUTO: 65.1 FL (ref 37–54)
DEPRECATED RDW RBC AUTO: 67.4 FL (ref 37–54)
EOSINOPHIL # BLD AUTO: 0.06 10*3/MM3 (ref 0–0.7)
EOSINOPHIL # BLD AUTO: 0.06 10*3/MM3 (ref 0–0.7)
EOSINOPHIL # BLD AUTO: 0.09 10*3/MM3 (ref 0–0.7)
EOSINOPHIL NFR BLD AUTO: 1 % (ref 0.3–6.2)
EOSINOPHIL NFR BLD AUTO: 1.2 % (ref 0.3–6.2)
EOSINOPHIL NFR BLD AUTO: 2 % (ref 0.3–6.2)
ERYTHROCYTE [DISTWIDTH] IN BLOOD BY AUTOMATED COUNT: 15.7 % (ref 11.5–14.5)
ERYTHROCYTE [DISTWIDTH] IN BLOOD BY AUTOMATED COUNT: 17.3 % (ref 11.5–14.5)
ERYTHROCYTE [DISTWIDTH] IN BLOOD BY AUTOMATED COUNT: 17.7 % (ref 11.5–14.5)
ERYTHROCYTE [DISTWIDTH] IN BLOOD BY AUTOMATED COUNT: 17.8 % (ref 11.5–14.5)
ERYTHROCYTE [DISTWIDTH] IN BLOOD BY AUTOMATED COUNT: 17.9 % (ref 11.5–14.5)
ERYTHROCYTE [DISTWIDTH] IN BLOOD BY AUTOMATED COUNT: 18.1 % (ref 11.5–14.5)
ERYTHROCYTE [DISTWIDTH] IN BLOOD BY AUTOMATED COUNT: 18.9 % (ref 11.5–14.5)
GFR SERPL CREATININE-BSD FRML MDRD: 12 ML/MIN/1.73
GFR SERPL CREATININE-BSD FRML MDRD: 14 ML/MIN/1.73
GFR SERPL CREATININE-BSD FRML MDRD: 8 ML/MIN/1.73
GFR SERPL CREATININE-BSD FRML MDRD: 9 ML/MIN/1.73
GLOBULIN UR ELPH-MCNC: 2.7 GM/DL
GLOBULIN UR ELPH-MCNC: 2.8 GM/DL
GLUCOSE BLD-MCNC: 102 MG/DL (ref 65–99)
GLUCOSE BLD-MCNC: 111 MG/DL (ref 65–99)
GLUCOSE BLD-MCNC: 114 MG/DL (ref 65–99)
GLUCOSE BLD-MCNC: 83 MG/DL (ref 65–99)
GLUCOSE BLDC GLUCOMTR-MCNC: 121 MG/DL (ref 70–130)
GLUCOSE BLDC GLUCOMTR-MCNC: 129 MG/DL (ref 70–130)
GLUCOSE BLDC GLUCOMTR-MCNC: 98 MG/DL (ref 70–130)
HBV SURFACE AG SERPL QL IA: NORMAL
HBV SURFACE AG SERPL QL IA: NORMAL
HCO3 BLDA-SCNC: 25 MMOL/L (ref 22–28)
HCT VFR BLD AUTO: 18.7 % (ref 40.4–52.2)
HCT VFR BLD AUTO: 23.3 % (ref 40.4–52.2)
HCT VFR BLD AUTO: 25.3 % (ref 40.4–52.2)
HCT VFR BLD AUTO: 25.6 % (ref 40.4–52.2)
HCT VFR BLD AUTO: 26.3 % (ref 40.4–52.2)
HCT VFR BLD AUTO: 26.3 % (ref 40.4–52.2)
HCT VFR BLD AUTO: 28.8 % (ref 40.4–52.2)
HCT VFR BLD AUTO: 29 % (ref 40.4–52.2)
HCT VFR BLD AUTO: 29 % (ref 40.4–52.2)
HCT VFR BLD AUTO: 29.1 % (ref 40.4–52.2)
HCT VFR BLD AUTO: 29.2 % (ref 40.4–52.2)
HCT VFR BLD AUTO: 29.2 % (ref 40.4–52.2)
HCT VFR BLD AUTO: 29.5 % (ref 40.4–52.2)
HCT VFR BLD AUTO: 29.8 % (ref 40.4–52.2)
HGB BLD-MCNC: 6.1 G/DL (ref 13.7–17.6)
HGB BLD-MCNC: 7.7 G/DL (ref 13.7–17.6)
HGB BLD-MCNC: 8.3 G/DL (ref 13.7–17.6)
HGB BLD-MCNC: 8.4 G/DL (ref 13.7–17.6)
HGB BLD-MCNC: 8.5 G/DL (ref 13.7–17.6)
HGB BLD-MCNC: 8.5 G/DL (ref 13.7–17.6)
HGB BLD-MCNC: 9.4 G/DL (ref 13.7–17.6)
HGB BLD-MCNC: 9.6 G/DL (ref 13.7–17.6)
HGB BLD-MCNC: 9.7 G/DL (ref 13.7–17.6)
HGB BLD-MCNC: 9.8 G/DL (ref 13.7–17.6)
HGB BLD-MCNC: 9.8 G/DL (ref 13.7–17.6)
HOLD SPECIMEN: NORMAL
HOROWITZ INDEX BLD+IHG-RTO: 100 %
IMM GRANULOCYTES # BLD: 0 10*3/MM3 (ref 0–0.03)
IMM GRANULOCYTES # BLD: 0 10*3/MM3 (ref 0–0.03)
IMM GRANULOCYTES # BLD: 0.02 10*3/MM3 (ref 0–0.03)
IMM GRANULOCYTES NFR BLD: 0 % (ref 0–0.5)
IMM GRANULOCYTES NFR BLD: 0 % (ref 0–0.5)
IMM GRANULOCYTES NFR BLD: 0.3 % (ref 0–0.5)
INR PPP: 1.35 (ref 0.9–1.1)
INR PPP: 1.36 (ref 0.9–1.1)
INR PPP: 1.41 (ref 0.9–1.1)
INR PPP: 1.42 (ref 0.9–1.1)
INR PPP: 1.44 (ref 0.9–1.1)
INR PPP: >10 (ref 0.9–1.1)
LDH FLD-CCNC: 80 U/L
LDH SERPL-CCNC: 171 U/L (ref 135–225)
LYMPHOCYTES # BLD AUTO: 0.6 10*3/MM3 (ref 0.9–4.8)
LYMPHOCYTES # BLD AUTO: 1.07 10*3/MM3 (ref 0.9–4.8)
LYMPHOCYTES # BLD AUTO: 1.15 10*3/MM3 (ref 0.9–4.8)
LYMPHOCYTES NFR BLD AUTO: 23.2 % (ref 19.6–45.3)
LYMPHOCYTES NFR BLD AUTO: 23.4 % (ref 19.6–45.3)
LYMPHOCYTES NFR BLD AUTO: 9.7 % (ref 19.6–45.3)
LYMPHOCYTES NFR FLD MANUAL: 60 %
MAXIMAL PREDICTED HEART RATE: 142 BPM
MCH RBC QN AUTO: 32.5 PG (ref 27–32.7)
MCH RBC QN AUTO: 32.5 PG (ref 27–32.7)
MCH RBC QN AUTO: 32.6 PG (ref 27–32.7)
MCH RBC QN AUTO: 32.7 PG (ref 27–32.7)
MCH RBC QN AUTO: 33 PG (ref 27–32.7)
MCH RBC QN AUTO: 33.1 PG (ref 27–32.7)
MCH RBC QN AUTO: 34.1 PG (ref 27–32.7)
MCHC RBC AUTO-ENTMCNC: 32.3 G/DL (ref 32.6–36.4)
MCHC RBC AUTO-ENTMCNC: 32.4 G/DL (ref 32.6–36.4)
MCHC RBC AUTO-ENTMCNC: 32.6 G/DL (ref 32.6–36.4)
MCHC RBC AUTO-ENTMCNC: 32.6 G/DL (ref 32.6–36.4)
MCHC RBC AUTO-ENTMCNC: 32.9 G/DL (ref 32.6–36.4)
MCHC RBC AUTO-ENTMCNC: 33 G/DL (ref 32.6–36.4)
MCHC RBC AUTO-ENTMCNC: 33.6 G/DL (ref 32.6–36.4)
MCV RBC AUTO: 100.3 FL (ref 79.8–96.2)
MCV RBC AUTO: 100.7 FL (ref 79.8–96.2)
MCV RBC AUTO: 100.8 FL (ref 79.8–96.2)
MCV RBC AUTO: 104.5 FL (ref 79.8–96.2)
MCV RBC AUTO: 98.3 FL (ref 79.8–96.2)
MCV RBC AUTO: 99 FL (ref 79.8–96.2)
MCV RBC AUTO: 99.7 FL (ref 79.8–96.2)
MODALITY: ABNORMAL
MONOCYTES # BLD AUTO: 0.38 10*3/MM3 (ref 0.2–1.2)
MONOCYTES # BLD AUTO: 0.41 10*3/MM3 (ref 0.2–1.2)
MONOCYTES # BLD AUTO: 0.77 10*3/MM3 (ref 0.2–1.2)
MONOCYTES NFR BLD AUTO: 12.4 % (ref 5–12)
MONOCYTES NFR BLD AUTO: 7.7 % (ref 5–12)
MONOCYTES NFR BLD AUTO: 9 % (ref 5–12)
MONOCYTES NFR FLD: 3 %
MONOS+MACROS NFR FLD: 20 %
NEUTROPHILS # BLD AUTO: 2.95 10*3/MM3 (ref 1.9–8.1)
NEUTROPHILS # BLD AUTO: 3.32 10*3/MM3 (ref 1.9–8.1)
NEUTROPHILS # BLD AUTO: 4.74 10*3/MM3 (ref 1.9–8.1)
NEUTROPHILS NFR BLD AUTO: 64.3 % (ref 42.7–76)
NEUTROPHILS NFR BLD AUTO: 67.1 % (ref 42.7–76)
NEUTROPHILS NFR BLD AUTO: 76.3 % (ref 42.7–76)
NEUTROPHILS NFR FLD MANUAL: 17 %
NRBC BLD MANUAL-RTO: 0 /100 WBC (ref 0–0)
O2 A-A PPRESDIFF RESPIRATORY: 0.1 MMHG
OVALOCYTES BLD QL SMEAR: NORMAL
PCO2 BLDA: 57.3 MM HG (ref 35–45)
PEEP RESPIRATORY: 5 CM[H2O]
PH BLDA: 7.25 PH UNITS (ref 7.35–7.45)
PHOSPHATE SERPL-MCNC: 3.5 MG/DL (ref 2.5–4.5)
PLAT MORPH BLD: NORMAL
PLATELET # BLD AUTO: 102 10*3/MM3 (ref 140–500)
PLATELET # BLD AUTO: 106 10*3/MM3 (ref 140–500)
PLATELET # BLD AUTO: 113 10*3/MM3 (ref 140–500)
PLATELET # BLD AUTO: 131 10*3/MM3 (ref 140–500)
PLATELET # BLD AUTO: 132 10*3/MM3 (ref 140–500)
PLATELET # BLD AUTO: 140 10*3/MM3 (ref 140–500)
PLATELET # BLD AUTO: 145 10*3/MM3 (ref 140–500)
PMV BLD AUTO: 10.1 FL (ref 6–12)
PMV BLD AUTO: 10.1 FL (ref 6–12)
PMV BLD AUTO: 10.3 FL (ref 6–12)
PMV BLD AUTO: 10.6 FL (ref 6–12)
PMV BLD AUTO: 10.7 FL (ref 6–12)
PMV BLD AUTO: 10.8 FL (ref 6–12)
PMV BLD AUTO: 9.8 FL (ref 6–12)
PO2 BLDA: 83.7 MM HG (ref 80–100)
POTASSIUM BLD-SCNC: 4.1 MMOL/L (ref 3.5–5.2)
POTASSIUM BLD-SCNC: 4.3 MMOL/L (ref 3.5–5.2)
POTASSIUM BLD-SCNC: 4.3 MMOL/L (ref 3.5–5.2)
POTASSIUM BLD-SCNC: 5.2 MMOL/L (ref 3.5–5.2)
PROT FLD-MCNC: 3.3 G/DL
PROT SERPL-MCNC: 5.3 G/DL (ref 6–8.5)
PROT SERPL-MCNC: 5.8 G/DL (ref 6–8.5)
PROT SERPL-MCNC: 6.2 G/DL (ref 6–8.5)
PROTHROMBIN TIME: 16.4 SECONDS (ref 11.7–14.2)
PROTHROMBIN TIME: 16.5 SECONDS (ref 11.7–14.2)
PROTHROMBIN TIME: 17 SECONDS (ref 11.7–14.2)
PROTHROMBIN TIME: 17.1 SECONDS (ref 11.7–14.2)
PROTHROMBIN TIME: 17.3 SECONDS (ref 11.7–14.2)
PROTHROMBIN TIME: 87.7 SECONDS (ref 11.7–14.2)
RBC # BLD AUTO: 1.79 10*6/MM3 (ref 4.6–6)
RBC # BLD AUTO: 2.61 10*6/MM3 (ref 4.6–6)
RBC # BLD AUTO: 2.89 10*6/MM3 (ref 4.6–6)
RBC # BLD AUTO: 2.89 10*6/MM3 (ref 4.6–6)
RBC # BLD AUTO: 2.93 10*6/MM3 (ref 4.6–6)
RBC # BLD AUTO: 2.94 10*6/MM3 (ref 4.6–6)
RBC # BLD AUTO: 2.97 10*6/MM3 (ref 4.6–6)
RBC # FLD AUTO: 5000 /MM3
RH BLD: POSITIVE
RH BLD: POSITIVE
SAO2 % BLDCOA: 94 % (ref 92–99)
SCHISTOCYTES BLD QL SMEAR: NORMAL
SET MECH RESP RATE: 20
SODIUM BLD-SCNC: 135 MMOL/L (ref 136–145)
SODIUM BLD-SCNC: 136 MMOL/L (ref 136–145)
SODIUM BLD-SCNC: 137 MMOL/L (ref 136–145)
SODIUM BLD-SCNC: 140 MMOL/L (ref 136–145)
SPHEROCYTES BLD QL SMEAR: NORMAL
STRESS TARGET HR: 121 BPM
T&S EXPIRATION DATE: NORMAL
TOTAL RATE: 31 BREATHS/MINUTE
TROPONIN T SERPL-MCNC: 0.13 NG/ML (ref 0–0.03)
TROPONIN T SERPL-MCNC: 0.16 NG/ML (ref 0–0.03)
UNIT  ABO: NORMAL
UNIT  RH: NORMAL
VANCOMYCIN SERPL-MCNC: 16 MCG/ML (ref 5–40)
VENTILATOR MODE: AC
VT ON VENT VENT: 523 ML
WBC # FLD: 22 /MM3
WBC MORPH BLD: NORMAL
WBC NRBC COR # BLD: 0.94 10*3/MM3 (ref 4.5–10.7)
WBC NRBC COR # BLD: 1.16 10*3/MM3 (ref 4.5–10.7)
WBC NRBC COR # BLD: 4.4 10*3/MM3 (ref 4.5–10.7)
WBC NRBC COR # BLD: 4.58 10*3/MM3 (ref 4.5–10.7)
WBC NRBC COR # BLD: 4.95 10*3/MM3 (ref 4.5–10.7)
WBC NRBC COR # BLD: 5.4 10*3/MM3 (ref 4.5–10.7)
WBC NRBC COR # BLD: 6.21 10*3/MM3 (ref 4.5–10.7)

## 2018-01-01 PROCEDURE — 85027 COMPLETE CBC AUTOMATED: CPT | Performed by: INTERNAL MEDICINE

## 2018-01-01 PROCEDURE — 87340 HEPATITIS B SURFACE AG IA: CPT | Performed by: INTERNAL MEDICINE

## 2018-01-01 PROCEDURE — 84155 ASSAY OF PROTEIN SERUM: CPT | Performed by: INTERNAL MEDICINE

## 2018-01-01 PROCEDURE — 85014 HEMATOCRIT: CPT | Performed by: INTERNAL MEDICINE

## 2018-01-01 PROCEDURE — 86901 BLOOD TYPING SEROLOGIC RH(D): CPT | Performed by: INTERNAL MEDICINE

## 2018-01-01 PROCEDURE — 94799 UNLISTED PULMONARY SVC/PX: CPT

## 2018-01-01 PROCEDURE — C9132 KCENTRA, PER I.U.: HCPCS | Performed by: EMERGENCY MEDICINE

## 2018-01-01 PROCEDURE — 87015 SPECIMEN INFECT AGNT CONCNTJ: CPT | Performed by: INTERNAL MEDICINE

## 2018-01-01 PROCEDURE — 25010000002 AMIODARONE IN DEXTROSE 5% 360-4.14 MG/200ML-% SOLUTION

## 2018-01-01 PROCEDURE — 82962 GLUCOSE BLOOD TEST: CPT

## 2018-01-01 PROCEDURE — 25010000002 ONDANSETRON PER 1 MG: Performed by: INTERNAL MEDICINE

## 2018-01-01 PROCEDURE — 87070 CULTURE OTHR SPECIMN AEROBIC: CPT | Performed by: INTERNAL MEDICINE

## 2018-01-01 PROCEDURE — 93010 ELECTROCARDIOGRAM REPORT: CPT | Performed by: INTERNAL MEDICINE

## 2018-01-01 PROCEDURE — 83605 ASSAY OF LACTIC ACID: CPT | Performed by: INTERNAL MEDICINE

## 2018-01-01 PROCEDURE — 86900 BLOOD TYPING SEROLOGIC ABO: CPT

## 2018-01-01 PROCEDURE — 71045 X-RAY EXAM CHEST 1 VIEW: CPT

## 2018-01-01 PROCEDURE — 85018 HEMOGLOBIN: CPT | Performed by: INTERNAL MEDICINE

## 2018-01-01 PROCEDURE — 86850 RBC ANTIBODY SCREEN: CPT | Performed by: INTERNAL MEDICINE

## 2018-01-01 PROCEDURE — 99222 1ST HOSP IP/OBS MODERATE 55: CPT | Performed by: INTERNAL MEDICINE

## 2018-01-01 PROCEDURE — 0W993ZX DRAINAGE OF RIGHT PLEURAL CAVITY, PERCUTANEOUS APPROACH, DIAGNOSTIC: ICD-10-PCS | Performed by: RADIOLOGY

## 2018-01-01 PROCEDURE — 25010000002 PROPOFOL 10 MG/ML EMULSION

## 2018-01-01 PROCEDURE — 85025 COMPLETE CBC W/AUTO DIFF WBC: CPT | Performed by: INTERNAL MEDICINE

## 2018-01-01 PROCEDURE — P9016 RBC LEUKOCYTES REDUCED: HCPCS

## 2018-01-01 PROCEDURE — 25010000002 AMIODARONE IN DEXTROSE 5% 150-4.21 MG/100ML-% SOLUTION

## 2018-01-01 PROCEDURE — 99285 EMERGENCY DEPT VISIT HI MDM: CPT

## 2018-01-01 PROCEDURE — 87186 SC STD MICRODIL/AGAR DIL: CPT | Performed by: INTERNAL MEDICINE

## 2018-01-01 PROCEDURE — 02HV33Z INSERTION OF INFUSION DEVICE INTO SUPERIOR VENA CAVA, PERCUTANEOUS APPROACH: ICD-10-PCS | Performed by: INTERNAL MEDICINE

## 2018-01-01 PROCEDURE — 94002 VENT MGMT INPAT INIT DAY: CPT

## 2018-01-01 PROCEDURE — 82803 BLOOD GASES ANY COMBINATION: CPT

## 2018-01-01 PROCEDURE — 5A1D70Z PERFORMANCE OF URINARY FILTRATION, INTERMITTENT, LESS THAN 6 HOURS PER DAY: ICD-10-PCS | Performed by: INTERNAL MEDICINE

## 2018-01-01 PROCEDURE — 86850 RBC ANTIBODY SCREEN: CPT | Performed by: EMERGENCY MEDICINE

## 2018-01-01 PROCEDURE — 80202 ASSAY OF VANCOMYCIN: CPT | Performed by: INTERNAL MEDICINE

## 2018-01-01 PROCEDURE — 99232 SBSQ HOSP IP/OBS MODERATE 35: CPT | Performed by: INTERNAL MEDICINE

## 2018-01-01 PROCEDURE — 0B978ZZ DRAINAGE OF LEFT MAIN BRONCHUS, VIA NATURAL OR ARTIFICIAL OPENING ENDOSCOPIC: ICD-10-PCS | Performed by: INTERNAL MEDICINE

## 2018-01-01 PROCEDURE — 36600 WITHDRAWAL OF ARTERIAL BLOOD: CPT

## 2018-01-01 PROCEDURE — 25010000002 VITAMIN K1 PER 1 MG: Performed by: EMERGENCY MEDICINE

## 2018-01-01 PROCEDURE — 87205 SMEAR GRAM STAIN: CPT | Performed by: INTERNAL MEDICINE

## 2018-01-01 PROCEDURE — 0B938ZZ DRAINAGE OF RIGHT MAIN BRONCHUS, VIA NATURAL OR ARTIFICIAL OPENING ENDOSCOPIC: ICD-10-PCS | Performed by: INTERNAL MEDICINE

## 2018-01-01 PROCEDURE — B548ZZA ULTRASONOGRAPHY OF SUPERIOR VENA CAVA, GUIDANCE: ICD-10-PCS | Performed by: INTERNAL MEDICINE

## 2018-01-01 PROCEDURE — 25010000002 HYDROCORTISONE SODIUM SUCCINATE 100 MG RECONSTITUTED SOLUTION: Performed by: INTERNAL MEDICINE

## 2018-01-01 PROCEDURE — 25010000002 PROPOFOL 10 MG/ML EMULSION: Performed by: ANESTHESIOLOGY

## 2018-01-01 PROCEDURE — 25010000002 PHENYLEPHRINE PER 1 ML

## 2018-01-01 PROCEDURE — 86923 COMPATIBILITY TEST ELECTRIC: CPT

## 2018-01-01 PROCEDURE — 0DJ08ZZ INSPECTION OF UPPER INTESTINAL TRACT, VIA NATURAL OR ARTIFICIAL OPENING ENDOSCOPIC: ICD-10-PCS | Performed by: INTERNAL MEDICINE

## 2018-01-01 PROCEDURE — 25010000002 VANCOMYCIN 10 G RECONSTITUTED SOLUTION: Performed by: INTERNAL MEDICINE

## 2018-01-01 PROCEDURE — 25010000002 PHENYLEPHRINE 10 MG/ML SOLUTION: Performed by: INTERNAL MEDICINE

## 2018-01-01 PROCEDURE — 25010000002 AMIODARONE IN DEXTROSE 5% 360-4.14 MG/200ML-% SOLUTION: Performed by: INTERNAL MEDICINE

## 2018-01-01 PROCEDURE — 80048 BASIC METABOLIC PNL TOTAL CA: CPT | Performed by: INTERNAL MEDICINE

## 2018-01-01 PROCEDURE — 84157 ASSAY OF PROTEIN OTHER: CPT | Performed by: INTERNAL MEDICINE

## 2018-01-01 PROCEDURE — 25010000002 DIGOXIN PER 500 MCG: Performed by: INTERNAL MEDICINE

## 2018-01-01 PROCEDURE — 86901 BLOOD TYPING SEROLOGIC RH(D): CPT | Performed by: EMERGENCY MEDICINE

## 2018-01-01 PROCEDURE — 36415 COLL VENOUS BLD VENIPUNCTURE: CPT

## 2018-01-01 PROCEDURE — 83615 LACTATE (LD) (LDH) ENZYME: CPT | Performed by: INTERNAL MEDICINE

## 2018-01-01 PROCEDURE — 85025 COMPLETE CBC W/AUTO DIFF WBC: CPT | Performed by: EMERGENCY MEDICINE

## 2018-01-01 PROCEDURE — 87040 BLOOD CULTURE FOR BACTERIA: CPT | Performed by: INTERNAL MEDICINE

## 2018-01-01 PROCEDURE — 99233 SBSQ HOSP IP/OBS HIGH 50: CPT | Performed by: INTERNAL MEDICINE

## 2018-01-01 PROCEDURE — 25010000002 PIPERACILLIN SOD-TAZOBACTAM PER 1 G: Performed by: INTERNAL MEDICINE

## 2018-01-01 PROCEDURE — 25010000002 FENTANYL CITRATE (PF) 100 MCG/2ML SOLUTION

## 2018-01-01 PROCEDURE — 94640 AIRWAY INHALATION TREATMENT: CPT

## 2018-01-01 PROCEDURE — 85610 PROTHROMBIN TIME: CPT | Performed by: EMERGENCY MEDICINE

## 2018-01-01 PROCEDURE — 31500 INSERT EMERGENCY AIRWAY: CPT

## 2018-01-01 PROCEDURE — 93005 ELECTROCARDIOGRAM TRACING: CPT | Performed by: EMERGENCY MEDICINE

## 2018-01-01 PROCEDURE — 99223 1ST HOSP IP/OBS HIGH 75: CPT | Performed by: INTERNAL MEDICINE

## 2018-01-01 PROCEDURE — 5A1945Z RESPIRATORY VENTILATION, 24-96 CONSECUTIVE HOURS: ICD-10-PCS | Performed by: INTERNAL MEDICINE

## 2018-01-01 PROCEDURE — 84484 ASSAY OF TROPONIN QUANT: CPT | Performed by: INTERNAL MEDICINE

## 2018-01-01 PROCEDURE — 80053 COMPREHEN METABOLIC PANEL: CPT | Performed by: INTERNAL MEDICINE

## 2018-01-01 PROCEDURE — 25010000002 METOCLOPRAMIDE PER 10 MG: Performed by: INTERNAL MEDICINE

## 2018-01-01 PROCEDURE — 99213 OFFICE O/P EST LOW 20 MIN: CPT | Performed by: INTERNAL MEDICINE

## 2018-01-01 PROCEDURE — 83605 ASSAY OF LACTIC ACID: CPT | Performed by: EMERGENCY MEDICINE

## 2018-01-01 PROCEDURE — 82550 ASSAY OF CK (CPK): CPT | Performed by: INTERNAL MEDICINE

## 2018-01-01 PROCEDURE — 76942 ECHO GUIDE FOR BIOPSY: CPT

## 2018-01-01 PROCEDURE — 80053 COMPREHEN METABOLIC PANEL: CPT | Performed by: EMERGENCY MEDICINE

## 2018-01-01 PROCEDURE — 86900 BLOOD TYPING SEROLOGIC ABO: CPT | Performed by: INTERNAL MEDICINE

## 2018-01-01 PROCEDURE — 85007 BL SMEAR W/DIFF WBC COUNT: CPT | Performed by: EMERGENCY MEDICINE

## 2018-01-01 PROCEDURE — 94003 VENT MGMT INPAT SUBQ DAY: CPT

## 2018-01-01 PROCEDURE — 86900 BLOOD TYPING SEROLOGIC ABO: CPT | Performed by: EMERGENCY MEDICINE

## 2018-01-01 PROCEDURE — 82042 OTHER SOURCE ALBUMIN QUAN EA: CPT | Performed by: INTERNAL MEDICINE

## 2018-01-01 PROCEDURE — 25010000002 PROTHROMBIN COMPLEX CONC HUMAN 1000 UNITS KIT: Performed by: EMERGENCY MEDICINE

## 2018-01-01 PROCEDURE — 36430 TRANSFUSION BLD/BLD COMPNT: CPT

## 2018-01-01 PROCEDURE — 80069 RENAL FUNCTION PANEL: CPT | Performed by: INTERNAL MEDICINE

## 2018-01-01 PROCEDURE — 82553 CREATINE MB FRACTION: CPT | Performed by: INTERNAL MEDICINE

## 2018-01-01 PROCEDURE — 93005 ELECTROCARDIOGRAM TRACING: CPT | Performed by: INTERNAL MEDICINE

## 2018-01-01 PROCEDURE — 84484 ASSAY OF TROPONIN QUANT: CPT | Performed by: EMERGENCY MEDICINE

## 2018-01-01 PROCEDURE — 85610 PROTHROMBIN TIME: CPT | Performed by: INTERNAL MEDICINE

## 2018-01-01 PROCEDURE — 89051 BODY FLUID CELL COUNT: CPT | Performed by: INTERNAL MEDICINE

## 2018-01-01 PROCEDURE — 93306 TTE W/DOPPLER COMPLETE: CPT

## 2018-01-01 PROCEDURE — 87150 DNA/RNA AMPLIFIED PROBE: CPT | Performed by: INTERNAL MEDICINE

## 2018-01-01 PROCEDURE — 0BH17EZ INSERTION OF ENDOTRACHEAL AIRWAY INTO TRACHEA, VIA NATURAL OR ARTIFICIAL OPENING: ICD-10-PCS | Performed by: INTERNAL MEDICINE

## 2018-01-01 PROCEDURE — 93000 ELECTROCARDIOGRAM COMPLETE: CPT | Performed by: INTERNAL MEDICINE

## 2018-01-01 PROCEDURE — 25010000002 DESMOPRESSIN PER 1 MCG: Performed by: INTERNAL MEDICINE

## 2018-01-01 PROCEDURE — 43235 EGD DIAGNOSTIC BRUSH WASH: CPT | Performed by: INTERNAL MEDICINE

## 2018-01-01 PROCEDURE — 0B918ZZ DRAINAGE OF TRACHEA, VIA NATURAL OR ARTIFICIAL OPENING ENDOSCOPIC: ICD-10-PCS | Performed by: INTERNAL MEDICINE

## 2018-01-01 PROCEDURE — 93306 TTE W/DOPPLER COMPLETE: CPT | Performed by: INTERNAL MEDICINE

## 2018-01-01 RX ORDER — ALBUMIN (HUMAN) 12.5 G/50ML
12.5 SOLUTION INTRAVENOUS
Status: DISCONTINUED | OUTPATIENT
Start: 2018-01-01 | End: 2018-01-01

## 2018-01-01 RX ORDER — WARFARIN SODIUM 5 MG/1
TABLET ORAL
Qty: 30 TABLET | Refills: 1 | Status: SHIPPED | OUTPATIENT
Start: 2018-01-01 | End: 2018-01-01 | Stop reason: SDUPTHER

## 2018-01-01 RX ORDER — LORAZEPAM 2 MG/ML
2 INJECTION INTRAMUSCULAR
Status: CANCELLED | OUTPATIENT
Start: 2018-01-01 | End: 2018-07-15

## 2018-01-01 RX ORDER — FENTANYL CITRATE 50 UG/ML
INJECTION, SOLUTION INTRAMUSCULAR; INTRAVENOUS
Status: COMPLETED
Start: 2018-01-01 | End: 2018-01-01

## 2018-01-01 RX ORDER — SODIUM BICARBONATE 650 MG/1
650 TABLET ORAL 3 TIMES DAILY
Status: DISCONTINUED | OUTPATIENT
Start: 2018-01-01 | End: 2018-01-01

## 2018-01-01 RX ORDER — ACETAMINOPHEN 650 MG/1
650 SUPPOSITORY RECTAL EVERY 4 HOURS PRN
Status: DISCONTINUED | OUTPATIENT
Start: 2018-01-01 | End: 2018-01-01 | Stop reason: HOSPADM

## 2018-01-01 RX ORDER — DEXMEDETOMIDINE HYDROCHLORIDE 4 UG/ML
INJECTION, SOLUTION INTRAVENOUS
Status: COMPLETED
Start: 2018-01-01 | End: 2018-01-01

## 2018-01-01 RX ORDER — DIGOXIN 0.25 MG/ML
250 INJECTION INTRAMUSCULAR; INTRAVENOUS ONCE
Status: COMPLETED | OUTPATIENT
Start: 2018-01-01 | End: 2018-01-01

## 2018-01-01 RX ORDER — ATORVASTATIN CALCIUM 40 MG/1
TABLET, FILM COATED ORAL
Qty: 30 TABLET | Refills: 3 | Status: SHIPPED | OUTPATIENT
Start: 2018-01-01

## 2018-01-01 RX ORDER — MANNITOL 250 MG/ML
12.5 INJECTION, SOLUTION INTRAVENOUS AS NEEDED
Status: ACTIVE | OUTPATIENT
Start: 2018-01-01 | End: 2018-01-01

## 2018-01-01 RX ORDER — PROPOFOL 10 MG/ML
VIAL (ML) INTRAVENOUS AS NEEDED
Status: DISCONTINUED | OUTPATIENT
Start: 2018-01-01 | End: 2018-01-01 | Stop reason: SURG

## 2018-01-01 RX ORDER — POLYETHYLENE GLYCOL 3350, SODIUM CHLORIDE, POTASSIUM CHLORIDE, SODIUM BICARBONATE, AND SODIUM SULFATE 240; 5.84; 2.98; 6.72; 22.72 G/4L; G/4L; G/4L; G/4L; G/4L
2000 POWDER, FOR SOLUTION ORAL 2 TIMES DAILY
Status: COMPLETED | OUTPATIENT
Start: 2018-01-01 | End: 2018-01-01

## 2018-01-01 RX ORDER — FENTANYL CITRATE 50 UG/ML
100 INJECTION, SOLUTION INTRAMUSCULAR; INTRAVENOUS ONCE
Status: COMPLETED | OUTPATIENT
Start: 2018-01-01 | End: 2018-01-01

## 2018-01-01 RX ORDER — METOCLOPRAMIDE HYDROCHLORIDE 5 MG/ML
5 INJECTION INTRAMUSCULAR; INTRAVENOUS
Status: DISCONTINUED | OUTPATIENT
Start: 2018-01-01 | End: 2018-01-01 | Stop reason: HOSPADM

## 2018-01-01 RX ORDER — ACETAMINOPHEN 325 MG/1
650 TABLET ORAL EVERY 4 HOURS PRN
Status: CANCELLED | OUTPATIENT
Start: 2018-01-01

## 2018-01-01 RX ORDER — DEXMEDETOMIDINE HYDROCHLORIDE 4 UG/ML
.2-1.5 INJECTION, SOLUTION INTRAVENOUS
Status: DISCONTINUED | OUTPATIENT
Start: 2018-01-01 | End: 2018-01-01 | Stop reason: HOSPADM

## 2018-01-01 RX ORDER — ONDANSETRON 2 MG/ML
4 INJECTION INTRAMUSCULAR; INTRAVENOUS EVERY 4 HOURS PRN
Status: DISCONTINUED | OUTPATIENT
Start: 2018-01-01 | End: 2018-01-01 | Stop reason: HOSPADM

## 2018-01-01 RX ORDER — LIDOCAINE HYDROCHLORIDE 20 MG/ML
INJECTION, SOLUTION INFILTRATION; PERINEURAL AS NEEDED
Status: DISCONTINUED | OUTPATIENT
Start: 2018-01-01 | End: 2018-01-01 | Stop reason: SURG

## 2018-01-01 RX ORDER — LIDOCAINE HYDROCHLORIDE 10 MG/ML
20 INJECTION, SOLUTION INFILTRATION; PERINEURAL ONCE
Status: COMPLETED | OUTPATIENT
Start: 2018-01-01 | End: 2018-01-01

## 2018-01-01 RX ORDER — PROMETHAZINE HYDROCHLORIDE 25 MG/ML
12.5 INJECTION, SOLUTION INTRAMUSCULAR; INTRAVENOUS EVERY 6 HOURS PRN
Status: DISCONTINUED | OUTPATIENT
Start: 2018-01-01 | End: 2018-01-01 | Stop reason: HOSPADM

## 2018-01-01 RX ORDER — WARFARIN SODIUM 5 MG/1
TABLET ORAL
Qty: 30 TABLET | Refills: 1 | Status: SHIPPED | OUTPATIENT
Start: 2018-01-01

## 2018-01-01 RX ORDER — METOCLOPRAMIDE HYDROCHLORIDE 5 MG/ML
10 INJECTION INTRAMUSCULAR; INTRAVENOUS ONCE
Status: COMPLETED | OUTPATIENT
Start: 2018-01-01 | End: 2018-01-01

## 2018-01-01 RX ORDER — SODIUM CHLORIDE 0.9 % (FLUSH) 0.9 %
1-10 SYRINGE (ML) INJECTION AS NEEDED
Status: DISCONTINUED | OUTPATIENT
Start: 2018-01-01 | End: 2018-01-01 | Stop reason: HOSPADM

## 2018-01-01 RX ORDER — ACETAMINOPHEN 650 MG/1
650 SUPPOSITORY RECTAL EVERY 4 HOURS PRN
Status: CANCELLED | OUTPATIENT
Start: 2018-01-01

## 2018-01-01 RX ORDER — LIDOCAINE AND PRILOCAINE 25; 25 MG/G; MG/G
CREAM TOPICAL AS NEEDED
Status: DISCONTINUED | OUTPATIENT
Start: 2018-01-01 | End: 2018-01-01 | Stop reason: HOSPADM

## 2018-01-01 RX ORDER — DIPHENOXYLATE HYDROCHLORIDE AND ATROPINE SULFATE 2.5; .025 MG/1; MG/1
1 TABLET ORAL
Status: CANCELLED | OUTPATIENT
Start: 2018-01-01

## 2018-01-01 RX ORDER — BISACODYL 5 MG/1
10 TABLET, DELAYED RELEASE ORAL ONCE
Status: COMPLETED | OUTPATIENT
Start: 2018-01-01 | End: 2018-01-01

## 2018-01-01 RX ORDER — ONDANSETRON 2 MG/ML
4 INJECTION INTRAMUSCULAR; INTRAVENOUS EVERY 6 HOURS PRN
Status: DISCONTINUED | OUTPATIENT
Start: 2018-01-01 | End: 2018-01-01

## 2018-01-01 RX ORDER — METOCLOPRAMIDE HYDROCHLORIDE 5 MG/ML
10 INJECTION INTRAMUSCULAR; INTRAVENOUS
Status: DISCONTINUED | OUTPATIENT
Start: 2018-01-01 | End: 2018-01-01

## 2018-01-01 RX ORDER — IPRATROPIUM BROMIDE AND ALBUTEROL SULFATE 2.5; .5 MG/3ML; MG/3ML
3 SOLUTION RESPIRATORY (INHALATION)
Status: DISCONTINUED | OUTPATIENT
Start: 2018-01-01 | End: 2018-01-01

## 2018-01-01 RX ORDER — CLOPIDOGREL BISULFATE 75 MG/1
75 TABLET ORAL DAILY
COMMUNITY

## 2018-01-01 RX ORDER — LORAZEPAM 2 MG/ML
0.5 INJECTION INTRAMUSCULAR
Status: CANCELLED | OUTPATIENT
Start: 2018-01-01 | End: 2018-07-15

## 2018-01-01 RX ORDER — LORAZEPAM 2 MG/ML
1 CONCENTRATE ORAL
Status: CANCELLED | OUTPATIENT
Start: 2018-01-01 | End: 2018-07-15

## 2018-01-01 RX ORDER — LORAZEPAM 2 MG/ML
2 CONCENTRATE ORAL
Status: CANCELLED | OUTPATIENT
Start: 2018-01-01 | End: 2018-07-15

## 2018-01-01 RX ORDER — LORAZEPAM 2 MG/ML
0.5 CONCENTRATE ORAL
Status: CANCELLED | OUTPATIENT
Start: 2018-01-01 | End: 2018-07-15

## 2018-01-01 RX ORDER — LORAZEPAM 2 MG/ML
1 INJECTION INTRAMUSCULAR
Status: CANCELLED | OUTPATIENT
Start: 2018-01-01 | End: 2018-07-15

## 2018-01-01 RX ORDER — VANCOMYCIN HYDROCHLORIDE 1 G/200ML
15 INJECTION, SOLUTION INTRAVENOUS ONCE
Status: DISCONTINUED | OUTPATIENT
Start: 2018-01-01 | End: 2018-01-01 | Stop reason: DRUGHIGH

## 2018-01-01 RX ORDER — ACETAMINOPHEN 160 MG/5ML
650 SOLUTION ORAL EVERY 4 HOURS PRN
Status: CANCELLED | OUTPATIENT
Start: 2018-01-01

## 2018-01-01 RX ORDER — POLYETHYLENE GLYCOL 3350, SODIUM CHLORIDE, POTASSIUM CHLORIDE, SODIUM BICARBONATE, AND SODIUM SULFATE 240; 5.84; 2.98; 6.72; 22.72 G/4L; G/4L; G/4L; G/4L; G/4L
2000 POWDER, FOR SOLUTION ORAL DAILY
Status: DISCONTINUED | OUTPATIENT
Start: 2018-01-01 | End: 2018-01-01

## 2018-01-01 RX ORDER — DIGOXIN 0.25 MG/ML
500 INJECTION INTRAMUSCULAR; INTRAVENOUS ONCE
Status: CANCELLED | OUTPATIENT
Start: 2018-01-01

## 2018-01-01 RX ORDER — PANTOPRAZOLE SODIUM 40 MG/10ML
80 INJECTION, POWDER, LYOPHILIZED, FOR SOLUTION INTRAVENOUS ONCE
Status: COMPLETED | OUTPATIENT
Start: 2018-01-01 | End: 2018-01-01

## 2018-01-01 RX ORDER — ALBUMIN (HUMAN) 12.5 G/50ML
12.5 SOLUTION INTRAVENOUS AS NEEDED
Status: DISCONTINUED | OUTPATIENT
Start: 2018-01-01 | End: 2018-01-01 | Stop reason: HOSPADM

## 2018-01-01 RX ORDER — PROPOFOL 10 MG/ML
VIAL (ML) INTRAVENOUS
Status: COMPLETED
Start: 2018-01-01 | End: 2018-01-01

## 2018-01-01 RX ORDER — PHENYLEPHRINE HCL IN 0.9% NACL 0.5 MG/5ML
.5-3 SYRINGE (ML) INTRAVENOUS
Status: DISCONTINUED | OUTPATIENT
Start: 2018-01-01 | End: 2018-01-01 | Stop reason: HOSPADM

## 2018-01-01 RX ORDER — LIDOCAINE HYDROCHLORIDE 10 MG/ML
20 INJECTION, SOLUTION INFILTRATION; PERINEURAL ONCE
Status: CANCELLED | OUTPATIENT
Start: 2018-01-01 | End: 2018-01-01

## 2018-01-01 RX ADMIN — VASOPRESSIN 0.03 UNITS/MIN: 20 INJECTION, SOLUTION INTRAMUSCULAR; SUBCUTANEOUS at 09:45

## 2018-01-01 RX ADMIN — SODIUM CHLORIDE 8 MG/HR: 900 INJECTION INTRAVENOUS at 05:41

## 2018-01-01 RX ADMIN — DEXMEDETOMIDINE HYDROCHLORIDE 1.5 MCG/KG/HR: 4 INJECTION, SOLUTION INTRAVENOUS at 11:30

## 2018-01-01 RX ADMIN — SODIUM BICARBONATE 650 MG: 650 TABLET ORAL at 20:37

## 2018-01-01 RX ADMIN — AMIODARONE HYDROCHLORIDE 1 MG/MIN: 1.8 INJECTION, SOLUTION INTRAVENOUS at 14:28

## 2018-01-01 RX ADMIN — AMIODARONE HYDROCHLORIDE 150 MG: 1.5 INJECTION, SOLUTION INTRAVENOUS at 13:47

## 2018-01-01 RX ADMIN — METOCLOPRAMIDE 5 MG: 5 INJECTION, SOLUTION INTRAMUSCULAR; INTRAVENOUS at 20:47

## 2018-01-01 RX ADMIN — DEXMEDETOMIDINE HYDROCHLORIDE 1.49 MCG/KG/HR: 4 INJECTION, SOLUTION INTRAVENOUS at 20:55

## 2018-01-01 RX ADMIN — METOCLOPRAMIDE 10 MG: 5 INJECTION, SOLUTION INTRAMUSCULAR; INTRAVENOUS at 00:43

## 2018-01-01 RX ADMIN — IPRATROPIUM BROMIDE AND ALBUTEROL SULFATE 3 ML: .5; 3 SOLUTION RESPIRATORY (INHALATION) at 19:45

## 2018-01-01 RX ADMIN — PHENYLEPHRINE HYDROCHLORIDE: 10 INJECTION INTRAVENOUS at 18:55

## 2018-01-01 RX ADMIN — DEXMEDETOMIDINE HYDROCHLORIDE 1 MCG/KG/HR: 4 INJECTION, SOLUTION INTRAVENOUS at 10:54

## 2018-01-01 RX ADMIN — SODIUM CHLORIDE 8 MG/HR: 900 INJECTION INTRAVENOUS at 19:32

## 2018-01-01 RX ADMIN — PROTHROMBIN, COAGULATION FACTOR VII HUMAN, COAGULATION FACTOR IX HUMAN, COAGULATION FACTOR X HUMAN, PROTEIN C, PROTEIN S HUMAN, AND WATER 3000 UNITS: KIT at 10:38

## 2018-01-01 RX ADMIN — HYDROCORTISONE SODIUM SUCCINATE 50 MG: 100 INJECTION, POWDER, FOR SOLUTION INTRAMUSCULAR; INTRAVENOUS at 09:04

## 2018-01-01 RX ADMIN — DESMOPRESSIN ACETATE 17.36 MCG: 4 SOLUTION INTRAVENOUS at 14:50

## 2018-01-01 RX ADMIN — BISACODYL 10 MG: 5 TABLET ORAL at 17:00

## 2018-01-01 RX ADMIN — SODIUM CHLORIDE 8 MG/HR: 900 INJECTION INTRAVENOUS at 19:08

## 2018-01-01 RX ADMIN — SODIUM BICARBONATE 650 MG: 650 TABLET ORAL at 16:55

## 2018-01-01 RX ADMIN — PANTOPRAZOLE SODIUM 80 MG: 40 INJECTION, POWDER, FOR SOLUTION INTRAVENOUS at 13:30

## 2018-01-01 RX ADMIN — METOCLOPRAMIDE 5 MG: 5 INJECTION, SOLUTION INTRAMUSCULAR; INTRAVENOUS at 06:41

## 2018-01-01 RX ADMIN — NOREPINEPHRINE BITARTRATE 0.3 MCG/KG/MIN: 8 SOLUTION at 14:00

## 2018-01-01 RX ADMIN — LIDOCAINE HYDROCHLORIDE 16 ML: 10 INJECTION, SOLUTION INFILTRATION; PERINEURAL at 09:26

## 2018-01-01 RX ADMIN — DIGOXIN 250 MCG: 0.25 INJECTION INTRAMUSCULAR; INTRAVENOUS at 12:42

## 2018-01-01 RX ADMIN — IPRATROPIUM BROMIDE AND ALBUTEROL SULFATE 3 ML: .5; 3 SOLUTION RESPIRATORY (INHALATION) at 06:37

## 2018-01-01 RX ADMIN — SODIUM CHLORIDE 8 MG/HR: 900 INJECTION INTRAVENOUS at 05:29

## 2018-01-01 RX ADMIN — NOREPINEPHRINE BITARTRATE 0.3 MCG/KG/MIN: 8 SOLUTION at 18:53

## 2018-01-01 RX ADMIN — AMIODARONE HYDROCHLORIDE 0.5 MG/MIN: 1.8 INJECTION, SOLUTION INTRAVENOUS at 11:43

## 2018-01-01 RX ADMIN — HYDROCORTISONE SODIUM SUCCINATE 50 MG: 100 INJECTION, POWDER, FOR SOLUTION INTRAMUSCULAR; INTRAVENOUS at 16:09

## 2018-01-01 RX ADMIN — PROPOFOL 100 MG: 10 INJECTION, EMULSION INTRAVENOUS at 08:05

## 2018-01-01 RX ADMIN — VASOPRESSIN 0.03 UNITS/MIN: 20 INJECTION, SOLUTION INTRAMUSCULAR; SUBCUTANEOUS at 15:56

## 2018-01-01 RX ADMIN — LIDOCAINE AND PRILOCAINE 1 APPLICATION: 25; 25 CREAM TOPICAL at 12:21

## 2018-01-01 RX ADMIN — DEXMEDETOMIDINE HYDROCHLORIDE 1.49 MCG/KG/HR: 4 INJECTION, SOLUTION INTRAVENOUS at 03:52

## 2018-01-01 RX ADMIN — Medication 3 MCG/KG/MIN: at 11:30

## 2018-01-01 RX ADMIN — POLYETHYLENE GLYCOL 3350, SODIUM CHLORIDE, POTASSIUM CHLORIDE, SODIUM BICARBONATE, AND SODIUM SULFATE 2000 ML: 240; 5.84; 2.98; 6.72; 22.72 POWDER, FOR SOLUTION ORAL at 23:07

## 2018-01-01 RX ADMIN — VANCOMYCIN HYDROCHLORIDE 1250 MG: 10 INJECTION, POWDER, LYOPHILIZED, FOR SOLUTION INTRAVENOUS at 16:00

## 2018-01-01 RX ADMIN — IPRATROPIUM BROMIDE AND ALBUTEROL SULFATE 3 ML: .5; 3 SOLUTION RESPIRATORY (INHALATION) at 06:31

## 2018-01-01 RX ADMIN — TAZOBACTAM SODIUM AND PIPERACILLIN SODIUM 4.5 G: 500; 4 INJECTION, SOLUTION INTRAVENOUS at 15:07

## 2018-01-01 RX ADMIN — LIDOCAINE HYDROCHLORIDE 100 MG: 20 INJECTION, SOLUTION INFILTRATION; PERINEURAL at 08:05

## 2018-01-01 RX ADMIN — SODIUM BICARBONATE 650 MG: 650 TABLET ORAL at 20:04

## 2018-01-01 RX ADMIN — METOCLOPRAMIDE 5 MG: 5 INJECTION, SOLUTION INTRAMUSCULAR; INTRAVENOUS at 10:56

## 2018-01-01 RX ADMIN — AMIODARONE HYDROCHLORIDE 0.5 MG/MIN: 1.8 INJECTION, SOLUTION INTRAVENOUS at 06:42

## 2018-01-01 RX ADMIN — SODIUM CHLORIDE 500 ML: 9 INJECTION, SOLUTION INTRAVENOUS at 08:23

## 2018-01-01 RX ADMIN — IPRATROPIUM BROMIDE AND ALBUTEROL SULFATE 3 ML: .5; 3 SOLUTION RESPIRATORY (INHALATION) at 20:05

## 2018-01-01 RX ADMIN — SODIUM CHLORIDE 250 ML: 9 INJECTION, SOLUTION INTRAVENOUS at 08:30

## 2018-01-01 RX ADMIN — ONDANSETRON 4 MG: 2 INJECTION, SOLUTION INTRAMUSCULAR; INTRAVENOUS at 21:39

## 2018-01-01 RX ADMIN — SODIUM CHLORIDE 8 MG/HR: 900 INJECTION INTRAVENOUS at 00:30

## 2018-01-01 RX ADMIN — IPRATROPIUM BROMIDE AND ALBUTEROL SULFATE 3 ML: .5; 3 SOLUTION RESPIRATORY (INHALATION) at 10:59

## 2018-01-01 RX ADMIN — AMIODARONE HYDROCHLORIDE 150 MG: 1.5 INJECTION, SOLUTION INTRAVENOUS at 16:03

## 2018-01-01 RX ADMIN — DEXMEDETOMIDINE HYDROCHLORIDE 1.5 MCG/KG/HR: 4 INJECTION, SOLUTION INTRAVENOUS at 15:00

## 2018-01-01 RX ADMIN — SODIUM CHLORIDE 8 MG/HR: 900 INJECTION INTRAVENOUS at 15:00

## 2018-01-01 RX ADMIN — FENTANYL CITRATE 100 MCG: 50 INJECTION, SOLUTION INTRAMUSCULAR; INTRAVENOUS at 11:20

## 2018-01-01 RX ADMIN — IPRATROPIUM BROMIDE AND ALBUTEROL SULFATE 3 ML: .5; 3 SOLUTION RESPIRATORY (INHALATION) at 00:01

## 2018-01-01 RX ADMIN — HYDROCORTISONE SODIUM SUCCINATE 50 MG: 100 INJECTION, POWDER, FOR SOLUTION INTRAMUSCULAR; INTRAVENOUS at 03:55

## 2018-01-01 RX ADMIN — METOCLOPRAMIDE 10 MG: 5 INJECTION, SOLUTION INTRAMUSCULAR; INTRAVENOUS at 12:43

## 2018-01-01 RX ADMIN — NOREPINEPHRINE BITARTRATE 0.3 MCG/KG/MIN: 8 SOLUTION at 06:19

## 2018-01-01 RX ADMIN — HYDROCORTISONE SODIUM SUCCINATE 50 MG: 100 INJECTION, POWDER, FOR SOLUTION INTRAMUSCULAR; INTRAVENOUS at 20:47

## 2018-01-01 RX ADMIN — TAZOBACTAM SODIUM AND PIPERACILLIN SODIUM 4.5 G: 500; 4 INJECTION, SOLUTION INTRAVENOUS at 00:16

## 2018-01-01 RX ADMIN — VASOPRESSIN 0.03 UNITS/MIN: 20 INJECTION, SOLUTION INTRAMUSCULAR; SUBCUTANEOUS at 00:17

## 2018-01-01 RX ADMIN — PHYTONADIONE 10 MG: 10 INJECTION, EMULSION INTRAMUSCULAR; INTRAVENOUS; SUBCUTANEOUS at 10:02

## 2018-01-01 RX ADMIN — Medication 3 MCG/KG/MIN: at 20:39

## 2018-01-01 RX ADMIN — ONDANSETRON 4 MG: 2 INJECTION, SOLUTION INTRAMUSCULAR; INTRAVENOUS at 03:24

## 2018-01-01 RX ADMIN — NOREPINEPHRINE BITARTRATE 0.3 MCG/KG/MIN: 8 SOLUTION at 22:02

## 2018-01-01 RX ADMIN — NOREPINEPHRINE BITARTRATE 0.3 MCG/KG/MIN: 8 SOLUTION at 11:42

## 2018-01-01 RX ADMIN — SODIUM CHLORIDE 8 MG/HR: 900 INJECTION INTRAVENOUS at 00:16

## 2018-01-01 RX ADMIN — SODIUM CHLORIDE 8 MG/HR: 900 INJECTION INTRAVENOUS at 09:47

## 2018-01-01 RX ADMIN — METOCLOPRAMIDE 5 MG: 5 INJECTION, SOLUTION INTRAMUSCULAR; INTRAVENOUS at 17:30

## 2018-01-01 RX ADMIN — PROPOFOL 10 MCG/KG/MIN: 10 INJECTION, EMULSION INTRAVENOUS at 10:30

## 2018-01-01 RX ADMIN — FENTANYL CITRATE 100 MCG: 50 INJECTION INTRAMUSCULAR; INTRAVENOUS at 11:20

## 2018-01-01 RX ADMIN — AMIODARONE HYDROCHLORIDE 1 MG/MIN: 1.8 INJECTION, SOLUTION INTRAVENOUS at 19:36

## 2018-01-01 RX ADMIN — POLYETHYLENE GLYCOL 3350, SODIUM CHLORIDE, POTASSIUM CHLORIDE, SODIUM BICARBONATE, AND SODIUM SULFATE 2000 ML: 240; 5.84; 2.98; 6.72; 22.72 POWDER, FOR SOLUTION ORAL at 17:01

## 2018-01-01 RX ADMIN — IPRATROPIUM BROMIDE AND ALBUTEROL SULFATE 3 ML: .5; 3 SOLUTION RESPIRATORY (INHALATION) at 00:18

## 2018-01-01 RX ADMIN — TAZOBACTAM SODIUM AND PIPERACILLIN SODIUM 4.5 G: 500; 4 INJECTION, SOLUTION INTRAVENOUS at 11:48

## 2018-01-01 RX ADMIN — IPRATROPIUM BROMIDE AND ALBUTEROL SULFATE 3 ML: .5; 3 SOLUTION RESPIRATORY (INHALATION) at 15:17

## 2018-01-01 RX ADMIN — SODIUM BICARBONATE 650 MG: 650 TABLET ORAL at 08:46

## 2018-01-01 RX ADMIN — SODIUM BICARBONATE 650 MG: 650 TABLET ORAL at 18:32

## 2018-01-01 RX ADMIN — ACETAMINOPHEN 650 MG: 650 SUPPOSITORY RECTAL at 21:00

## 2018-01-01 RX ADMIN — Medication 3 MCG/KG/MIN: at 09:46

## 2018-01-01 RX ADMIN — SODIUM CHLORIDE 500 ML: 9 INJECTION, SOLUTION INTRAVENOUS at 09:07

## 2018-01-18 NOTE — PROGRESS NOTES
PATIENT TOLERATED TRANSFUSION WITHOUT DIFFICULTY. VSS. PO FLUIDS COFFEE AND PEPSI AND OUTPATIENT HAM BOX FOR LUNCH. BRP PRIOR TO START OF TRANSFUSION AND PRIOR TO DISCHARGE HOME. PATIENT STATES HE URINATES VERY LITTLE SINCE START OF DIALYSIS. PATIENT DISCHARGED HOME AMB AFTER APPOINTMENT COMPLETED.

## 2018-01-18 NOTE — PATIENT INSTRUCTIONS
Blood Transfusion , Adult  A blood transfusion is a procedure in which you receive donated blood, including plasma, platelets, and red blood cells, through an IV tube. You may need a blood transfusion because of illness, surgery, or injury. The blood may come from a donor. You may also be able to donate blood for yourself (autologous blood donation) before a surgery if you know that you might require a blood transfusion.  The blood given in a transfusion is made up of different types of cells. You may receive:  · Red blood cells. These carry oxygen to the cells in the body.  · White blood cells. These help you fight infections.  · Platelets. These help your blood to clot.  · Plasma. This is the liquid part of your blood and it helps with fluid imbalances.  If you have hemophilia or another clotting disorder, you may also receive other types of blood products.  Tell a health care provider about:  · Any allergies you have.  · All medicines you are taking, including vitamins, herbs, eye drops, creams, and over-the-counter medicines.  · Any problems you or family members have had with anesthetic medicines.  · Any blood disorders you have.  · Any surgeries you have had.  · Any medical conditions you have, including any recent fever or cold symptoms.  · Whether you are pregnant or may be pregnant.  · Any previous reactions you have had during a blood transfusion.  What are the risks?  Generally, this is a safe procedure. However, problems may occur, including:  · Having an allergic reaction to something in the donated blood. Hives and itching may be symptoms of this type of reaction.  · Fever. This may be a reaction to the white blood cells in the transfused blood. Nausea or chest pain may accompany a fever.  · Iron overload. This can happen from having many transfusions.  · Transfusion-related acute lung injury (TRALI). This is a rare reaction that causes lung damage. The cause is not known. TRALI can occur within hours  of a transfusion or several days later.  · Sudden (acute) or delayed hemolytic reactions. This happens if your blood does not match the cells in your transfusion. Your body’s defense system (immune system) may try to attack the new cells. This complication is rare. The symptoms include fever, chills, nausea, and low back pain or chest pain.  · Infection or disease transmission. This is rare.  What happens before the procedure?  · You will have a blood test to determine your blood type. This is necessary to know what kind of blood your body will accept and to match it to the donor blood.  · If you are going to have a planned surgery, you may be able to do an autologous blood donation. This may be done in case you need to have a transfusion.  · If you have had an allergic reaction to a transfusion in the past, you may be given medicine to help prevent a reaction. This medicine may be given to you by mouth or through an IV tube.  · You will have your temperature, blood pressure, and pulse monitored before the transfusion.  · Follow instructions from your health care provider about eating and drinking restrictions.  · Ask your health care provider about:  ¨ Changing or stopping your regular medicines. This is especially important if you are taking diabetes medicines or blood thinners.  ¨ Taking medicines such as aspirin and ibuprofen. These medicines can thin your blood. Do not take these medicines before your procedure if your health care provider instructs you not to.  What happens during the procedure?  · An IV tube will be inserted into one of your veins.  · The bag of donated blood will be attached to your IV tube. The blood will then enter through your vein.  · Your temperature, blood pressure, and pulse will be monitored regularly during the transfusion. This monitoring is done to detect early signs of a transfusion reaction.  · If you have any signs or symptoms of a reaction, your transfusion will be stopped and  you may be given medicine.  · When the transfusion is complete, your IV tube will be removed.  · Pressure may be applied to the IV site for a few minutes.  · A bandage (dressing) will be applied.  The procedure may vary among health care providers and hospitals.  What happens after the procedure?  · Your temperature, blood pressure, heart rate, breathing rate, and blood oxygen level will be monitored often.  · Your blood may be tested to see how you are responding to the transfusion.  · You may be warmed with fluids or blankets to maintain a normal body temperature.  Summary  · A blood transfusion is a procedure in which you receive donated blood, including plasma, platelets, and red blood cells, through an IV tube.  · Your temperature, blood pressure, and pulse will be monitored before, during, and after the transfusion.  · Your blood may be tested after the transfusion to see how your body has responded.  This information is not intended to replace advice given to you by your health care provider. Make sure you discuss any questions you have with your health care provider.  Document Released: 12/15/2001 Document Revised: 09/14/2017 Document Reviewed: 09/14/2017  [employer] Patient Education © 2017 [employer]  Anemia, Nonspecific  Anemia is a condition in which the concentration of red blood cells or hemoglobin in the blood is below normal. Hemoglobin is a substance in red blood cells that carries oxygen to the tissues of the body. Anemia results in not enough oxygen reaching these tissues.  What are the causes?  Common causes of anemia include:  · Excessive bleeding. Bleeding may be internal or external. This includes excessive bleeding from periods (in women) or from the intestine.  · Poor nutrition.  · Chronic kidney, thyroid, and liver disease.  · Bone marrow disorders that decrease red blood cell production.  · Cancer and treatments for cancer.  · HIV, AIDS, and their treatments.  · Spleen  problems that increase red blood cell destruction.  · Blood disorders.  · Excess destruction of red blood cells due to infection, medicines, and autoimmune disorders.  What are the signs or symptoms?  · Minor weakness.  · Dizziness.  · Headache.  · Palpitations.  · Shortness of breath, especially with exercise.  · Paleness.  · Cold sensitivity.  · Indigestion.  · Nausea.  · Difficulty sleeping.  · Difficulty concentrating.  Symptoms may occur suddenly or they may develop slowly.  How is this diagnosed?  Additional blood tests are often needed. These help your health care provider determine the best treatment. Your health care provider will check your stool for blood and look for other causes of blood loss.  How is this treated?  Treatment varies depending on the cause of the anemia. Treatment can include:  · Supplements of iron, vitamin B12, or folic acid.  · Hormone medicines.  · A blood transfusion. This may be needed if blood loss is severe.  · Hospitalization. This may be needed if there is significant continual blood loss.  · Dietary changes.  · Spleen removal.  Follow these instructions at home:  Keep all follow-up appointments. It often takes many weeks to correct anemia, and having your health care provider check on your condition and your response to treatment is very important.  Get help right away if:  · You develop extreme weakness, shortness of breath, or chest pain.  · You become dizzy or have trouble concentrating.  · You develop heavy vaginal bleeding.  · You develop a rash.  · You have bloody or black, tarry stools.  · You faint.  · You vomit up blood.  · You vomit repeatedly.  · You have abdominal pain.  · You have a fever or persistent symptoms for more than 2-3 days.  · You have a fever and your symptoms suddenly get worse.  · You are dehydrated.  This information is not intended to replace advice given to you by your health care provider. Make sure you discuss any questions you have with your  health care provider.  Document Released: 01/25/2006 Document Revised: 05/31/2017 Document Reviewed: 06/13/2014  [employer] Interactive Patient Education © 2017 [employer]

## 2018-05-15 PROBLEM — I42.9 CARDIOMYOPATHY (HCC): Status: ACTIVE | Noted: 2018-01-01

## 2018-05-15 NOTE — PROGRESS NOTES
Subjective:     Encounter Date:05/15/2018      Patient ID: Juan Diego Toure is a 78 y.o. male.    Chief Complaint:  History of Present Illness    This is a 78-year-old male with a history of hypertension, ESRD on hemodialysis, COPD, chronic atrial fibrillation, mild cardiopathy myopathy within last known EF of 41%, mild-to-moderate mitral regurgitation, coronary artery disease status post drug-eluting stent placement of his left circumflex artery, severe pulmonary hypertension, who presents for follow-up.     I began following the patient in 4/2016 when he presented with complaints of dyspnea on exertion.  At the time he reported that his symptoms were fairly stable.  We started work up with an echocardiogram that did show evidence of a mildly depressed left ventricle systolic function with an ejection fraction of 41%, mildly reduced right ventricular systolic function with moderate to severe mitral regurgitation and mild tricuspid regurgitation.  Around that time he was noted to be in atrial fibrillation which was a new diagnosis.  He was started on anticoagulation with warfarin.  He was also set up for a transesophageal echocardiogram in 5/2016 to further evaluate his mitral regurgitation that ended up appearing to be more in the mild-to-moderate range.     In follow-up the patient continued to have issues with progressive dyspnea on exertion.  Initially there was concern that his persistent anemia was the cause of his symptoms but when his symptoms did not improve following transfusion we proceeded with a stress test.  A stress test was performed in 10/2016 and showed evidence of inferolateral ischemia.  He subsequently was taken to the cardiac catheterization laboratory on 10/27/2016 and was found to have a subtotal stenosis of his proximal left circumflex artery for which he underwent drug-eluting stent placement.  His remaining coronary arteries were normal.  Following his PCI his dyspnea on exertion  improved back to his baseline.  He remained on clopidogrel only in addition to his warfarin for a year after his stent at which point the clopidogrel was discontinued.      In follow-up the patient developed issues with peritoneal dialysis and ended up being transitioned back to hemodialysis.  With that he initially did much better with less weight gain and feeling better overall.  However around that time we began having trouble controlling his heart rate without dropping his blood pressures.  We eventually were able to find a dose of metoprolol that controlled his heart rate without stable blood pressures.  At that time my main issue was trying to get good rate control without bottoming out his blood pressures.      I saw the patient last 2017 he and his wife felt that he was doing well overall.  Did not make any changes to his management but recommended a repeat echocardiogram to reevaluate his LV function and a 6 month follow-up.  His repeat echocardiogram was performed on 2017 and showed stable left ventricular systolic function that remained mildly depressed with an ejection fraction of 42%, mild to moderate mitral regurgitation, moderate tricuspid regurgitation, severe left atrial dilatation, severe right ventricular dilatation with mildly reduced function, and severe pulmonary hypertension.  At that time did not recommend any changes to his management.    Today he presents for his routine 6 month follow-up.  Unfortunately the patient's wife, who is very involved in his care,  about 2 weeks ago during a hospitalization.  Fortunately he has other family members who have stepped up and are helping him.  His main complaint today is progression in his dyspnea on exertion.  He still feels like his breathing is better than what it was before his PCI but overall the last couple of months he's noticed progression in his symptoms.  He doesn't know if his symptoms are due to his COPD or his heart.  He  denies any chest pain, palpitations, PND or orthopnea, near syncope or syncope, or lower extremity edema.  He has a significant amount of ecchymosis of his left upper extremity but otherwise denies any bleeding issues.  He has a home INR monitor and his dosing is managed by our anticoagulation clinic.       Review of Systems   Constitution: Positive for malaise/fatigue. Negative for weakness.   HENT: Negative for hearing loss, hoarse voice, nosebleeds and sore throat.    Eyes: Negative for pain.   Cardiovascular: Positive for dyspnea on exertion. Negative for chest pain, claudication, cyanosis, irregular heartbeat, leg swelling, near-syncope, orthopnea, palpitations, paroxysmal nocturnal dyspnea and syncope.   Respiratory: Negative for shortness of breath and snoring.    Endocrine: Negative for cold intolerance, heat intolerance, polydipsia, polyphagia and polyuria.   Skin: Negative for itching and rash.   Musculoskeletal: Negative for arthritis, falls, joint pain, joint swelling, muscle cramps, muscle weakness and myalgias.   Gastrointestinal: Negative for constipation, diarrhea, dysphagia, heartburn, hematemesis, hematochezia, melena, nausea and vomiting.   Genitourinary: Negative for frequency, hematuria and hesitancy.   Neurological: Negative for excessive daytime sleepiness, dizziness, headaches, light-headedness and numbness.   Psychiatric/Behavioral: Negative for depression. The patient is not nervous/anxious.           Current Outpatient Prescriptions:   •  acetaminophen (TYLENOL) 500 MG tablet, Take 500 mg by mouth every 6 (six) hours as needed for mild pain (1-3)., Disp: , Rfl:   •  aspirin 81 MG tablet, Take 1 tablet by mouth Daily., Disp: 30 tablet, Rfl: 11  •  atorvastatin (LIPITOR) 40 MG tablet, TAKE 1 TABLET BY MOUTH EVERY NIGHT., Disp: 30 tablet, Rfl: 3  •  folic acid (FOLVITE) 1 MG tablet, Take 1 mg by mouth daily., Disp: , Rfl:   •  metoprolol succinate XL (TOPROL-XL) 50 MG 24 hr tablet, Take 1  tablet by mouth Daily. (Patient taking differently: Take 50 mg by mouth Every Morning.), Disp: 90 tablet, Rfl: 3  •  Multiple Vitamins-Minerals (RENAPLEX-D PO), Take 1 tablet/day by mouth Daily., Disp: , Rfl:   •  sodium bicarbonate 650 MG tablet, Take 650 mg by mouth 2 (Two) Times a Day., Disp: , Rfl:   •  Umeclidinium Bromide (INCRUSE ELLIPTA) 62.5 MCG/INH aerosol powder , Inhale 1 puff As Needed., Disp: , Rfl:   •  warfarin (COUMADIN) 2.5 MG tablet, Take 2.5 mg by mouth Daily. PT HOLDING FOR SURGERY, Disp: , Rfl:   •  warfarin (COUMADIN) 2.5 MG tablet, TAKE 1 TABLET BY MOUTH ON MONDAY & FRIDAY AND 1/2 TABLET ON ALL OTHER DAYS OR AS DIRECTED, Disp: 30 tablet, Rfl: 0  •  warfarin (COUMADIN) 5 MG tablet, TAKE ONE TABLET ON TUESDAY AND SATURDAY. TAKE 1/2 TABLET ALL OTHER DAYS OR AS DIRECTED, Disp: 30 tablet, Rfl: 1    Past Medical History:   Diagnosis Date   • Anemia    • Anticoagulated on Coumadin    • Atrial fibrillation    • BPH (benign prostatic hyperplasia)    • CAD (coronary artery disease)    • CKD (chronic kidney disease)    • COPD (chronic obstructive pulmonary disease)    • Dialysis patient    • KENNEDY (dyspnea on exertion)    • ED (erectile dysfunction)    • End stage renal disease    • Fatigue    • Hyperlipidemia    • Hypertension    • Hypotension    • Prostate cancer    • Pulmonary emphysema    • Weakness    • Weight loss      Past Surgical History:   Procedure Laterality Date   • ANGIOPLASTY Left 11/24/2014    Left arm brachiocephalic arteriovenous fistula creation, Angioplasty of left cephalic vein, 5 mm x 150 mm balloon-Dr. Collin Byers   • ANGIOPLASTY Left 04/06/2015    Ligation of side brances x2 of Left Arm Arteriovenous fistula, Percutaneous angioplasty, left arm arteriovenous fistula, Dr. Collin Byers   • AV FISTULA PLACEMENT, BRACHIOCEPHALIC Left 11/24/2014    Ligation of side branches x2 of left arm arteriovenous fistula, peructaneous angioplasty, left arm arteriovenous fistula-Dr. Polanco  EMILY Byers   • CARDIAC CATHETERIZATION N/A 10/27/2016    Procedure: Coronary angiography;  Surgeon: Dominick Fowler MD;  Location:  [facility]AS[facility];  Service:    • CARDIAC CATHETERIZATION N/A 10/27/2016    Procedure: Left Heart Cath;  Surgeon: Dominick Fowler MD;  Location:  [facility];  Service:    • CATARACT EXTRACTION W/ INTRAOCULAR LENS IMPLANT Bilateral    • INGUINAL HERNIA REPAIR Right 01/02/2015    Open Right Inguinal Hernia repair w/ mesh-Dr. Loi Gutierrez   • INSERTION PERITONEAL DIALYSIS CATHETER N/A 01/02/2015    Laparoscopic placement of pertoneal dialysis catheter-Dr. Loi Gutierrez   • PROSTATECTOMY N/A    • REMOVAL PERITONEAL DIALYSIS CATHETER N/A 3/2/2017    Procedure: REMOVAL PERITONEAL DIALYSIS CATHETER;  Surgeon: James Fisher MD;  [facility];  Service:    • UMBILICAL HERNIA REPAIR N/A 08/13/2015    Open Umbilical Hernia Repair-Dr. James Fisher     Family History   Problem Relation Age of Onset   • Heart attack Mother    • Heart disease Mother    • Heart attack Father    • Heart disease Father    • No Known Problems Sister    • No Known Problems Maternal Grandmother    • No Known Problems Maternal Grandfather    • No Known Problems Paternal Grandmother    • No Known Problems Paternal Grandfather    • No Known Problems Sister      Social History   Substance Use Topics   • Smoking status: Former Smoker     Packs/day: 1.50   • Smokeless tobacco: Never Used      Comment: quit 15 years ago   • Alcohol use 1.2 oz/week     2 Cans of beer per week      Comment: SELDOMLY           ECG 12 Lead  Date/Time: 5/15/2018 3:28 PM  Performed by: DOMINICK FOWLER  Authorized by: DOMINICK FOWLER   Comparison: compared with previous ECG   Similar to previous ECG  Rhythm: atrial fibrillation  Ectopy: unifocal PVCs  Conduction: incomplete RBBB and LAFB               Objective:         Visit Vitals  /64 (BP Location: Right arm, Patient Position: Sitting)   Pulse  "84   Ht 170.2 cm (67\")   Wt 59.9 kg (132 lb)   BMI 20.67 kg/m²          Physical Exam   Constitutional: He is oriented to person, place, and time. He appears well-developed and well-nourished.   HENT:   Head: Normocephalic and atraumatic.   Neck: No JVD present. Carotid bruit is not present.   Cardiovascular: Normal rate, regular rhythm, S1 normal and S2 normal.    No murmur heard.  Pulses:       Radial pulses are 2+ on the right side, and 2+ on the left side.   Pulmonary/Chest: Effort normal and breath sounds normal.   Abdominal: Soft. Normal appearance.   Neurological: He is alert and oriented to person, place, and time.   Skin: Skin is warm, dry and intact.   Psychiatric: He has a normal mood and affect.       Lab Review:       Assessment:          Diagnosis Plan   1. Coronary artery disease involving native coronary artery of native heart without angina pectoris     2. S/P drug eluting coronary stent placement     3. Chronic atrial fibrillation     4. Essential hypertension     5. Other hyperlipidemia     6. Hemodialysis patient     7. Stage 5 chronic kidney disease on chronic dialysis     8. Pulmonary emphysema, unspecified emphysema type     9. Cardiomyopathy, unspecified type     10. Dyspnea on exertion            Plan:       1.  Dyspnea on exertion.  Per the patient his symptoms have progressed somewhat in the last couple months although same severity as before his ECI.  Discussed options of monitoring his symptoms for now versus proceeding with a stress test.  The patient is comfortable which is monitoring his symptoms for now since he has a \"lot going on\".  We'll closely wrist symptoms.  2.  Coronary artery disease.  Status post drug loading stent placement of his proximal left circumflex artery.  Continue management with atorvastatin, aspirin, and beta blocker.  Next line 3.  Chronic atrial fibrillation.  Rates are well controlled on his current dose of metoprolol.  He is on warfarin for anticoagulation " which she monitors using a home monitor and is managed by our anticoagulation clinic.  3.  Cardiomyopathy.  EF remains stable at around 42%.  Continue current management with metoprolol.  He is not on an ACE or ARB due to his history of ESRD.  4.  Hyperlipidemia.  On atorvastatin.  5.  COPD   6.  ESRD on hemodialysis  7.  PVCs.  Asymptomatic.  Continue beta blocker.    We'll plan on seeing the patient back again in 3 months.    Atrial Fibrillation and Atrial Flutter  Assessment  • The patient has permanent atrial fibrillation  • This is non-valvular in etiology  • The patient's CHADS2-VASc score is 4  • A [id number] score of 2 or more is considered a high risk for a thromboembolic event  • Warfarin prescribed    Plan  • Continue in atrial fibrillation with rate control  • Continue warfarin for antithrombotic therapy, bleeding issues discussed  • Continue beta blocker for rhythm control    Coronary Artery Disease  Assessment  • The patient has no angina    Plan  • Lifestyle modifications discussed include adhering to a heart healthy diet, avoidance of tobacco products, maintenance of a healthy weight, medication compliance, regular exercise and regular monitoring of cholesterol and blood pressure    Subjective - Objective  • There has been a previous stent procedure using BAILEE 10/2016  • Current antiplatelet therapy includes aspirin 81 mg      Heart Failure  Assessment  • NYHA class II - There is slight limitation of physical activity. The patient is comfortable at rest, but physical activity results in fatigue, palpitations or shortness of breath.  • ACE inhibitor not prescribed for medical reasons  • Beta blocker prescribed  • Diuretics not prescribed for medical reasons  • Angiotensin receptor blocker (ARB) not prescribed for medical reasons  • Calcium channel blockers not prescribed  • Left ventricular function is mildly reduced by qualitative assessment    Plan  • The heart failure care plan was discussed with  the patient today including: continuing the current program    Subjective/Objective    • Physical exam findings negative for elevated JVP.

## 2018-07-02 PROBLEM — K62.5 RECTAL BLEEDING: Status: ACTIVE | Noted: 2018-01-01

## 2018-07-02 NOTE — CONSULTS
[facility]  Initial Inpatient Consult Note    Referring Provider: Dr Barreto/Dr Jaimbeh    Reason for Consultation: GI bleed    Subjective     History of present illness:  78-year-old man with end-stage renal disease on Monday Wednesday Friday hemodialysis, also on warfarin for atrial fibrillation who was admitted with hematochezia.  He reports that he started with rectal bleeding on the 29th.  He reports that it was happening with every bowel movement.  It was frankly bloody.  He denied any associated abdominal pain, anal pain, nausea or vomiting.  He denies any acute precipitants.  Interestingly, he decided not to present to the emergency room.  However, he persisted with bleeding.  He called his dialysis Center this morning and described his symptoms and was told to come to the emergency room.    Upon arrival to the emergency room, his INR was greater than 10 and his hemoglobin was 6.1.  Last hemoglobin that I can see in the [facility] system is from March 2017 and is 11.1.  He has macrocytic indices.  He has a thrombocytopenia with platelets of 131.  He thinks that his INR was 2.2 on Friday.  He denies that he has been on any recent antibiotics.  He says that he did drink about 2 beers over the weekend.    A prior CT scan from 2015 shows that he does have some sigmoid diverticulosis.  He does not recall his last colonoscopy.  He denies a family history of any colon cancer.    Denies current complaints at this time other than wanting a cup of coffee.  He is currently getting blood and received some vitamin K in the emergency room.    Past Medical History:  Past Medical History:   Diagnosis Date   • Anemia    • Anticoagulated on Coumadin    • Atrial fibrillation    • BPH (benign prostatic hyperplasia)    • CAD (coronary artery disease)    • CKD (chronic kidney disease)    • COPD (chronic obstructive pulmonary disease)    • Dialysis patient    • KENNEDY (dyspnea on exertion)    • ED (erectile  dysfunction)    • End stage renal disease    • Fatigue    • Hyperlipidemia    • Hypertension    • Hypotension    • Prostate cancer    • Pulmonary emphysema    • Weakness    • Weight loss        Past Surgical History:  Past Surgical History:   Procedure Laterality Date   • ANGIOPLASTY Left 11/24/2014    Left arm brachiocephalic arteriovenous fistula creation, Angioplasty of left cephalic vein, 5 mm x 150 mm balloon-Dr. Collin Byers   • ANGIOPLASTY Left 04/06/2015    Ligation of side brances x2 of Left Arm Arteriovenous fistula, Percutaneous angioplasty, left arm arteriovenous fistula, Dr. Collin Byers   • AV FISTULA PLACEMENT, BRACHIOCEPHALIC Left 11/24/2014    Ligation of side branches x2 of left arm arteriovenous fistula, peructaneous angioplasty, left arm arteriovenous fistula-Dr. Collin Byers   • CARDIAC CATHETERIZATION N/A 10/27/2016    Procedure: Coronary angiography;  Surgeon: Jessie Espino MD;  Location: [facility] CATH INVASIVE LOCATION;  Service:    • CARDIAC CATHETERIZATION N/A 10/27/2016    Procedure: Left Heart Cath;  Surgeon: Jessie Espino MD;  Location: [facility] CATH INVASIVE LOCATION;  Service:    • CATARACT EXTRACTION W/ INTRAOCULAR LENS IMPLANT Bilateral    • INGUINAL HERNIA REPAIR Right 01/02/2015    Open Right Inguinal Hernia repair w/ mesh-Dr. Loi Gutierrez   • INSERTION PERITONEAL DIALYSIS CATHETER N/A 01/02/2015    Laparoscopic placement of pertoneal dialysis catheter-Dr. Loi Gutierrez   • PROSTATECTOMY N/A    • REMOVAL PERITONEAL DIALYSIS CATHETER N/A 3/2/2017    Procedure: REMOVAL PERITONEAL DIALYSIS CATHETER;  Surgeon: James Fisher MD;  Location: [facility] OR;  Service:    • UMBILICAL HERNIA REPAIR N/A 08/13/2015    Open Umbilical Hernia Repair-Dr. James Fisher        Social History:   Social History   Substance Use Topics   • Smoking status: Former Smoker     Packs/day: 1.50   • Smokeless tobacco: Never Used      Comment: quit 15 years ago   • Alcohol use 1.2 oz/week      2 Cans of beer per week      Comment: SELDOMLY        Family History:  Family History   Problem Relation Age of Onset   • Heart attack Mother    • Heart disease Mother    • Heart attack Father    • Heart disease Father    • No Known Problems Sister    • No Known Problems Maternal Grandmother    • No Known Problems Maternal Grandfather    • No Known Problems Paternal Grandmother    • No Known Problems Paternal Grandfather    • No Known Problems Sister        Home Meds:  Prescriptions Prior to Admission   Medication Sig Dispense Refill Last Dose   • aspirin 81 MG tablet Take 1 tablet by mouth Daily. 30 tablet 11 Taking   • atorvastatin (LIPITOR) 40 MG tablet TAKE 1 TABLET BY MOUTH EVERY NIGHT. 30 tablet 3 Taking   • clopidogrel (PLAVIX) 75 MG tablet Take 75 mg by mouth Daily.      • folic acid (FOLVITE) 1 MG tablet Take 1 mg by mouth daily.   Taking   • metoprolol succinate XL (TOPROL-XL) 50 MG 24 hr tablet Take 1 tablet by mouth Daily. (Patient taking differently: Take 50 mg by mouth Every Morning.) 90 tablet 3 Taking   • Multiple Vitamins-Minerals (RENAPLEX-D PO) Take 1 tablet/day by mouth Daily.   Taking   • sodium bicarbonate 650 MG tablet Take 650 mg by mouth 2 (Two) Times a Day.   Taking   • warfarin (COUMADIN) 2.5 MG tablet TAKE 1 TABLET BY MOUTH ON MONDAY & FRIDAY AND 1/2 TABLET ON ALL OTHER DAYS OR AS DIRECTED 30 tablet 0 Taking   • warfarin (COUMADIN) 5 MG tablet TAKE ONE TABLET ON TUESDAY AND SATURDAY. TAKE 1/2 TABLET ALL OTHER DAYS OR AS DIRECTED 30 tablet 1    • acetaminophen (TYLENOL) 500 MG tablet Take 500 mg by mouth every 6 (six) hours as needed for mild pain (1-3).   Taking   • Umeclidinium Bromide (INCRUSE ELLIPTA) 62.5 MCG/INH aerosol powder  Inhale 1 puff As Needed.   Taking   • warfarin (COUMADIN) 2.5 MG tablet Take 2.5 mg by mouth Daily. PT HOLDING FOR SURGERY   Taking       Current Meds:       Allergies:  No Known Allergies    Review of Systems  All systems were reviewed and negative except  for:  Gastrointestinal: postitive for  hematochezia     Objective     Vital Signs  Temp:  [96.9 °F (36.1 °C)-97.5 °F (36.4 °C)] 97.4 °F (36.3 °C)  Heart Rate:  [] 99  Resp:  [16-22] 16  BP: ()/(37-97) 105/50    Physical Exam:  Constitutional:    Alert, cooperative, in no acute distress, appears older than   Eyes:            Lids and lashes normal, conjunctivae and sclerae normal, no   icterus   Ears, nose, mouth and throat:   Normal appearance of external ears and nose, no oral lesions, no thrush, oral mucosa moist   Respiratory:     Clear to auscultation, respirations regular, even and                   unlabored    Cardiovascular:    Irregular rhythm andtachycardic rate, normal S1 and S2, no            murmur, no gallop, palpable distal pulses, 2+ lower extremity edema   Gastrointestinal:    Soft, non-distended, non-tender to palpation, no guarding, no rebound tenderness, normal bowel sounds, no palpable masses or organomegaly  Rectal exam: deferred   Musculoskeletal:   Normal station, no atrophy, no tenderness to palpation, normal digits and nails   Skin:   Pale and sallow color, no bleeding, bruising, rashes or lesions   Lymphatics:   No palpable cervical or supraclavicular adenopathy   Psychiatric:  Judgement and insight: normal   Orientation to person, place and time: normal   Mood and affect: normal       Results Review:   I reviewed the patient's new clinical results.      Results from last 7 days  Lab Units 07/02/18  0820   WBC 10*3/mm3 4.58   HEMOGLOBIN g/dL 6.1*   HEMATOCRIT % 18.7*   PLATELETS 10*3/mm3 131*         Results from last 7 days  Lab Units 07/02/18  0820   SODIUM mmol/L 137   POTASSIUM mmol/L 5.2   CHLORIDE mmol/L 94*   CO2 mmol/L 25.2   BUN mg/dL 66*   CREATININE mg/dL 7.06*   CALCIUM mg/dL 8.6   BILIRUBIN mg/dL 0.7   ALK PHOS U/L 76   ALT (SGPT) U/L 12   AST (SGOT) U/L 10   GLUCOSE mg/dL 114*         Results from last 7 days  Lab Units 07/02/18  0820   INR  >10.00*       No  results found for: LIPASE    Radiology:  Imaging Results (last 72 hours)     Procedure Component Value Units Date/Time    XR Chest 1 View [[id number]] Collected:  07/02/18 0954     Updated:  07/02/18 0954    Narrative:       PORTABLE CHEST     HISTORY: Shortness of air.     FINDINGS:  A single view of the chest demonstrates mild cardiomegaly.  There is minimal blunting of the costophrenic angle on the left. There  is no evidence of infiltrate on the left. On the right, a large pleural  effusion is appreciated with a suggestion of right lower lobe  atelectasis.             Assessment/Plan     Active Problems:    Rectal bleeding      Impression  1. Hematochezia: setting of supratherapeutic INR, occurring since 6/29  2. Acute blood loss anemia  3. Coagulopathy: INR ?10  4. A fib: on warfarin  5. ESRD: on dialysis  6. Sigmoid diverticulosis: as per 2015 imaging    Plan  -correct INR  -transfuse as per intensivist  -dialysis as per renal  -will further discuss endoscopy once he has stabilized with corrected INR and hemoglobin      I discussed the patients findings and my recommendations with patient, family and nursing staff    Marta Titus MD  [facility]      Dictated utilizing Dragon dictation

## 2018-07-02 NOTE — ED PROVIDER NOTES
" EMERGENCY DEPARTMENT ENCOUNTER    CHIEF COMPLAINT  Chief Complaint: Rectal bleeding  History given by: Pt  History limited by: Nothing  Room Number: RIGO/RIGO  PMD: Uche Castelan MD  Nephrology: Dr. Santana    HPI:  Pt is a 78 y.o. male who presents complaining of \"red\" rectal bleeding starting two days ago. He reports he has had numerous episodes of rectal bleeding with BM and states he has rectal bleeding with every BM. Pt reports he has a hx of afib and is on coumadin. He states he is unsure of his INR level. Pt denies any associated pain or vomiting. Pt denies a prior hx of these sx. He states he receives dialysis on Monday, Wednesday, and Friday.     Duration:  Two days ago  Onset: gradual  Timing: intermittent  Quality: \"red\"  Intensity/Severity: moderate to severe  Progression: unchanged  Associated Symptoms: none stated  Aggravating Factors: none  Alleviating Factors: none  Previous Episodes: Pt reports a hx of afib and is on coumadin  Treatment before arrival: Pt reports he receives dialysis Monday, Wednesday, Friday and is scheduled for dialysis today    PAST MEDICAL HISTORY  Active Ambulatory Problems     Diagnosis Date Noted   • COPD (chronic obstructive pulmonary disease) 02/15/2016   • Chronic kidney disease 02/15/2016   • Atrial fibrillation 04/06/2016   • Dyspnea 04/06/2016   • Essential hypertension 04/06/2016   • Hyperlipemia 04/06/2016   • Coronary artery disease involving native coronary artery of native heart without angina pectoris 12/02/2016   • Hemodialysis patient 05/10/2017   • S/P drug eluting coronary stent placement 11/14/2017   • Cardiomyopathy 05/15/2018     Resolved Ambulatory Problems     Diagnosis Date Noted   • Abnormal nuclear stress test 10/28/2016     Past Medical History:   Diagnosis Date   • Anemia    • Anticoagulated on Coumadin    • Atrial fibrillation    • BPH (benign prostatic hyperplasia)    • CAD (coronary artery disease)    • CKD (chronic kidney disease)    • COPD " (chronic obstructive pulmonary disease)    • Dialysis patient    • KENNEDY (dyspnea on exertion)    • ED (erectile dysfunction)    • End stage renal disease    • Fatigue    • Hyperlipidemia    • Hypertension    • Hypotension    • Prostate cancer    • Pulmonary emphysema    • Weakness    • Weight loss        PAST SURGICAL HISTORY  Past Surgical History:   Procedure Laterality Date   • ANGIOPLASTY Left 11/24/2014    Left arm brachiocephalic arteriovenous fistula creation, Angioplasty of left cephalic vein, 5 mm x 150 mm balloon-Dr. Collin Byers   • ANGIOPLASTY Left 04/06/2015    Ligation of side brances x2 of Left Arm Arteriovenous fistula, Percutaneous angioplasty, left arm arteriovenous fistula, Dr. Collin Byers   • AV FISTULA PLACEMENT, BRACHIOCEPHALIC Left 11/24/2014    Ligation of side branches x2 of left arm arteriovenous fistula, peructaneous angioplasty, left arm arteriovenous fistula-Dr. Collin Byers   • CARDIAC CATHETERIZATION N/A 10/27/2016    Procedure: Coronary angiography;  Surgeon: Jessie Esipno MD;  Location: [facility] INVASIVE LOCATION;  Service:    • CARDIAC CATHETERIZATION N/A 10/27/2016    Procedure: Left Heart Cath;  Surgeon: Jessie Espino MD;  Location: [facility] INVASIVE LOCATION;  Service:    • CATARACT EXTRACTION W/ INTRAOCULAR LENS IMPLANT Bilateral    • INGUINAL HERNIA REPAIR Right 01/02/2015    Open Right Inguinal Hernia repair w/ mesh-Dr. Loi Gutierrez   • INSERTION PERITONEAL DIALYSIS CATHETER N/A 01/02/2015    Laparoscopic placement of pertoneal dialysis catheter-Dr. Loi Gutierrez   • PROSTATECTOMY N/A    • REMOVAL PERITONEAL DIALYSIS CATHETER N/A 3/2/2017    Procedure: REMOVAL PERITONEAL DIALYSIS CATHETER;  Surgeon: James Fisher MD;  Location: [facility] OR;  Service:    • UMBILICAL HERNIA REPAIR N/A 08/13/2015    Open Umbilical Hernia Repair-Dr. James Fisher       FAMILY HISTORY  Family History   Problem Relation Age of Onset   • Heart attack Mother    •  Heart disease Mother    • Heart attack Father    • Heart disease Father    • No Known Problems Sister    • No Known Problems Maternal Grandmother    • No Known Problems Maternal Grandfather    • No Known Problems Paternal Grandmother    • No Known Problems Paternal Grandfather    • No Known Problems Sister        SOCIAL HISTORY  Social History     Social History   • Marital status:      Spouse name: N/A   • Number of children: N/A   • Years of education: N/A     Occupational History   • Not on file.     Social History Main Topics   • Smoking status: Former Smoker     Packs/day: 1.50   • Smokeless tobacco: Never Used      Comment: quit 15 years ago   • Alcohol use 1.2 oz/week     2 Cans of beer per week      Comment: SELDOMLY   • Drug use: No   • Sexual activity: Defer     Other Topics Concern   • Not on file     Social History Narrative   • No narrative on file       ALLERGIES  Patient has no known allergies.    REVIEW OF SYSTEMS  Review of Systems   Constitutional: Negative for activity change, appetite change and fever.   HENT: Negative for congestion and sore throat.    Eyes: Negative.    Respiratory: Negative for cough and shortness of breath.    Cardiovascular: Negative for chest pain and leg swelling.   Gastrointestinal: Positive for blood in stool. Negative for abdominal pain, diarrhea and vomiting.   Endocrine: Negative.    Genitourinary: Negative for decreased urine volume and dysuria.   Musculoskeletal: Negative for neck pain.   Skin: Negative for rash and wound.   Allergic/Immunologic: Negative.    Neurological: Negative for weakness, numbness and headaches.   Hematological: Negative.    Psychiatric/Behavioral: Negative.    All other systems reviewed and are negative.      PHYSICAL EXAM  ED Triage Vitals [07/02/18 0753]   Temp Heart Rate Resp BP SpO2   97.5 °F (36.4 °C) 113 22 -- 92 %      Temp src Heart Rate Source Patient Position BP Location FiO2 (%)   Tympanic -- -- -- --       Physical Exam    Constitutional: He is oriented to person, place, and time. He appears distressed (mild).   HENT:   Head: Normocephalic and atraumatic.   Dry mucous membranes   Eyes: EOM are normal. Pupils are equal, round, and reactive to light.   Pale conjunctivae   Neck: Normal range of motion. Neck supple.   Cardiovascular: Normal heart sounds.  An irregularly irregular rhythm present. Tachycardia present.    HR between 100-130   Pulmonary/Chest: Effort normal and breath sounds normal. No respiratory distress.   Abdominal: Soft. There is no tenderness. There is no rebound and no guarding.   Genitourinary: Rectal exam shows guaiac positive stool.   Genitourinary Comments: Pt has bright red blood in rectal vault.   Musculoskeletal: Normal range of motion. He exhibits edema (1+ BLE).   Neurological: He is alert and oriented to person, place, and time. He has normal sensation and normal strength.   Skin: Skin is warm and dry.   Psychiatric: Mood and affect normal.   Nursing note and vitals reviewed.      LAB RESULTS  Lab Results (last 24 hours)     Procedure Component Value Units Date/Time    CBC & Differential [[id number]] Collected:  07/02/18 0820    Specimen:  Blood Updated:  07/02/18 0904    Narrative:       The following orders were created for panel order CBC & Differential.  Procedure                               Abnormality         Status                     ---------                               -----------         ------                     Scan Slide[[id number]]                                       Final result               CBC Auto Differential[[id number]]        Abnormal            Final result                 Please view results for these tests on the individual orders.    Comprehensive Metabolic Panel [[id number]]  (Abnormal) Collected:  07/02/18 0820    Specimen:  Blood Updated:  07/02/18 0851     Glucose 114 (H) mg/dL      BUN 66 (H) mg/dL      Creatinine 7.06 (H) mg/dL      Sodium 137 mmol/L      Potassium 5.2  mmol/L      Chloride 94 (L) mmol/L      CO2 25.2 mmol/L      Calcium 8.6 mg/dL      Total Protein 6.2 g/dL      Albumin 3.50 g/dL      ALT (SGPT) 12 U/L      AST (SGOT) 10 U/L      Alkaline Phosphatase 76 U/L      Total Bilirubin 0.7 mg/dL      eGFR Non African Amer 8 (L) mL/min/1.73      Globulin 2.7 gm/dL      A/G Ratio 1.3 g/dL      BUN/Creatinine Ratio 9.3     Anion Gap 17.8 mmol/L     Narrative:       The MDRD GFR formula is only valid for adults with stable renal function between ages 18 and 70.    Protime-INR [[id number]]  (Abnormal) Collected:  07/02/18 0820    Specimen:  Blood Updated:  07/02/18 0921     Protime 87.7 (C) Seconds      INR >10.00 (C)    Troponin [[id number]]  (Abnormal) Collected:  07/02/18 0820    Specimen:  Blood Updated:  07/02/18 0900     Troponin T 0.161 (C) ng/mL     Narrative:       Troponin T Reference Ranges:  Less than 0.03 ng/mL:    Negative for AMI  0.03 to 0.09 ng/mL:      Indeterminant for AMI  Greater than 0.09 ng/mL: Positive for AMI    Lactic Acid, Plasma [[id number]]  (Abnormal) Collected:  07/02/18 0820    Specimen:  Blood Updated:  07/02/18 0859     Lactate 2.9 (C) mmol/L     CBC Auto Differential [[id number]]  (Abnormal) Collected:  07/02/18 0820    Specimen:  Blood Updated:  07/02/18 0904     WBC 4.58 10*3/mm3      RBC 1.79 (L) 10*6/mm3      Hemoglobin 6.1 (C) g/dL      Hematocrit 18.7 (C) %      .5 (H) fL      MCH 34.1 (H) pg      MCHC 32.6 g/dL      RDW 15.7 (H) %      RDW-SD 59.5 (H) fl      MPV 9.8 fL      Platelets 131 (L) 10*3/mm3      Neutrophil % 64.3 %      Lymphocyte % 23.4 %      Monocyte % 9.0 %      Eosinophil % 2.0 %      Basophil % 1.3 %      Immature Grans % 0.0 %      Neutrophils, Absolute 2.95 10*3/mm3      Lymphocytes, Absolute 1.07 10*3/mm3      Monocytes, Absolute 0.41 10*3/mm3      Eosinophils, Absolute 0.09 10*3/mm3      Basophils, Absolute 0.06 10*3/mm3      Immature Grans, Absolute 0.00 10*3/mm3      nRBC 0.0 /100 WBC     Scan Slide  [[id number]] Collected:  07/02/18 0820    Specimen:  Blood Updated:  07/02/18 0904     Anisocytosis Mod/2+     Ovalocytes Slight/1+     Schistocytes Slight/1+     Spherocytes Slight/1+     WBC Morphology Normal     Platelet Morphology Normal    Lactic Acid, Reflex Timer (This will reflex a repeat order 3-3:15 hours after ordered.) [[id number]] Collected:  07/02/18 0820    Specimen:  Blood Updated:  07/02/18 0859          I ordered the above labs and reviewed the results    RADIOLOGY  XR Chest 1 View         FINDINGS:  A single view of the chest demonstrates mild cardiomegaly.  There is minimal blunting of the costophrenic angle on the left. There  is no evidence of infiltrate on the left. On the right, a large pleural  effusion is appreciated with a suggestion of right lower lobe  atelectasis.        I ordered the above noted radiological studies. Interpreted by radiologist. Reviewed by me in PACS.       PROCEDURES  Critical Care  Performed by: JONNIE DAWN  Authorized by: JONNIE DAWN     Critical care provider statement:     Critical care time (minutes):  50    Critical care time was exclusive of:  Separately billable procedures and treating other patients    Critical care was necessary to treat or prevent imminent or life-threatening deterioration of the following conditions: Blood loss and anemia.    Critical care was time spent personally by me on the following activities:  Ordering and performing treatments and interventions, ordering and review of laboratory studies, ordering and review of radiographic studies, pulse oximetry, re-evaluation of patient's condition, review of old charts, interpretation of cardiac output measurements, obtaining history from patient or surrogate, evaluation of patient's response to treatment, examination of patient, discussions with consultants and development of treatment plan with patient or surrogate              EKG    EKG time: 0901  Rhythm/Rate: afib, 111  No Acute  Ischemia  Non-Specific ST-T changes    unchanged compared to prior on 10/2016    Interpreted Contemporaneously by me.  Independently viewed by me      PROGRESS AND CONSULTS       0816 - Lab work, CXR, and EKG ordered for further evaluation. 500 CC IVF bolus ordered.    0830 - Nurse reports that the pt is also on plavix and 81 mg of ASA.     0856 - Calls placed with pulmonology, nephrology, and GI. Blood transfusion ordered.     0857 - Rechecked pt. Afib has improved with a HR at around 100, BP 95/66, sats 98% on RA. Informed pt of the results of his lab work which shows anemia as well as his CXR which shows a R pleural effusion. Stated the plans to admit to the ICU. Pt understands and agrees with plan. All questions answered.     0903 - Because of pt's current condition including oxygen saturation, I will order a second 500 CC bolus of IVF.     0904 - Discussed pt care with Dr. Mccann (Nephrology). He reports he will come to the ER to see the pt.     0922 - Repeat INR level ordered. KCentra and Vitamin K ordered.     0945 - Dr. Mccann is at bedside.     0952 - Discussed pt care with Dr. Campos (GI). He reports that someone from GI will see the pt in consult.     0959 - Discussed pt care with Dr. Dougherty (Pulmonology) who agrees to admit the pt to the ICU.     1057 - Discussed pt care with Pam Pace (APRN for Dr. Alcaraz - ) who reports that they will see the pt in consult.     MEDICAL DECISION MAKING  Results were reviewed/discussed with the patient and they were also made aware of online access. Pt also made aware that some labs, such as cultures, will not be resulted during ER visit and follow up with PMD is necessary.     MDM  Number of Diagnoses or Management Options  Acute blood loss anemia:   Atrial fibrillation with rapid ventricular response:   End stage renal disease:   Pleural effusion, right:   Rectal bleeding:   Symptomatic hypotension:   Thrombocytopenia:   Warfarin-induced coagulopathy:       Amount and/or Complexity of Data Reviewed  Clinical lab tests: ordered and reviewed (BUN - 66  Creatinine - 7.06  Hemoglobin - 6.1  Lactate - 2.9)  Tests in the radiology section of CPT®: reviewed and ordered (CXR - large R pleural effusion)  Tests in the medicine section of CPT®: ordered and reviewed (See EKG procedure note.)  Decide to obtain previous medical records or to obtain history from someone other than the patient: yes  Discuss the patient with other providers: yes (Dr. Mccann (Nephrology)  Dr. Campos (GI)  Dr. Dougherty (Pulmonology)  Ann-Marie Pace (APRN for Dr. Alcaraz - GI))    Critical Care  Total time providing critical care: 30-74 minutes         DIAGNOSIS  Final diagnoses:   Rectal bleeding   Acute blood loss anemia   Symptomatic hypotension   End stage renal disease   Atrial fibrillation with rapid ventricular response   Pleural effusion, right   Thrombocytopenia   Warfarin-induced coagulopathy       DISPOSITION  ADMISSION    Discussed treatment plan and reason for admission with pt/family and admitting physician.  Pt/family voiced understanding of the plan for admission for further testing/treatment as needed.         Latest Documented Vital Signs:  As of 11:03 AM  BP- 122/52 HR- (!) 125 Temp- 96.9 °F (36.1 °C) (Tympanic) O2 sat- 100%    --  Documentation assistance provided by edith Garibay for Dr. Barreto.  Information recorded by the edith was done at my direction and has been verified and validated by me.     Marcos Garibay  07/02/18 0583       Marcos Garibay  07/02/18 1106       Farhad Barreto MD  07/02/18 1105

## 2018-07-02 NOTE — H&P
Patient Care Team:  Uche Castelan MD as PCP - General  Uche Castelan MD as PCP - Family Medicine  Francisco Huynh DPM as PCP - Claims Attributed  James Fisher MD as Consulting Physician (General Surgery)  Jessie Espino MD as Consulting Physician (Cardiology)    Chief complaint:  Blood per stool    History of present illness:  This is a 78-year-old male patient with history of ESRD on HD MWF (Dr. Santana) and coronary artery disease status post BAILEE left circumflex on 10/27/16 in addition to atrial fibrillation.  He is on triple anticoagulation with aspirin, Plavix and Coumadin.  He has mild COPD and uses to follow with Dr. Morales in our office.  He denies a prior history of gastrointestinal bleeding but reported that he had a colonoscopy about 5 years ago which was reportedly unrevealing.  He presented to the emergency department today for red blood per rectum which started last Friday.  He had too many episodes since then.  He stated that his wife was admitted here 4 months ago on Saturday and passed away so he did not want to come to the hospital.  He denies associated dizziness, nausea, vomiting or abdominal pain.  He stated that he had a good appetite.  He denies taking over the counter Advil, Aleve, ibuprofen or other NSAID.    He drove himself to the emergency department.  Upon arrival to the ED, he was found to be hypotensive with a systolic blood pressure of 17 heart rate of 120.  He was in atrial fibrillation as well.  He received 2 500 ml bolus IV fluid which improved his blood pressure up to 110.  Heart rate remains elevated.  Hemoglobin was 6.1 and his INR was greater than 10.  He received Kcentra and vitamin K.    Labs reviewed:  Hemoglobin 6.1, last was 11.150/1/17; INR of greater than 10; troponin = 0.1; potassium = 52; creatinine = 7; lactic acid = 2.9    Data:  I reviewed the report of prior left heart catheterization.  Echocardiogram on 11/14/17 showed EF of 42% with  severely dilated RV and RVSP >55 mm     Review of Systems:  Constitutional: No fever or chills.   ENMT: No sinus congestion  Cardiovascular: No chest pain, palpitation or legs swelling.    Respiratory: No dyspnea, cough or wheezing.  Gastrointestinal: No constipation, diarrhea or abdominal pain .  Bleeding.  Neurology: No headache, weakness, numbness or dizziness.   Musculoskeletal: No joints pain, stiffness or swelling.   Psychiatry: No depression.  Lymphatic: No swollen glands.  Integumentary: No rash.    History  Past Medical History:   Diagnosis Date   • Anemia    • Anticoagulated on Coumadin    • Atrial fibrillation    • BPH (benign prostatic hyperplasia)    • CAD (coronary artery disease)    • CKD (chronic kidney disease)    • COPD (chronic obstructive pulmonary disease)    • Dialysis patient    • KENNEDY (dyspnea on exertion)    • ED (erectile dysfunction)    • End stage renal disease    • Fatigue    • Hyperlipidemia    • Hypertension    • Hypotension    • Prostate cancer    • Pulmonary emphysema    • Weakness    • Weight loss      Past Surgical History:   Procedure Laterality Date   • ANGIOPLASTY Left 11/24/2014    Left arm brachiocephalic arteriovenous fistula creation, Angioplasty of left cephalic vein, 5 mm x 150 mm balloon-Dr. Collin Byers   • ANGIOPLASTY Left 04/06/2015    Ligation of side brances x2 of Left Arm Arteriovenous fistula, Percutaneous angioplasty, left arm arteriovenous fistula, Dr. Collin Byers   • AV FISTULA PLACEMENT, BRACHIOCEPHALIC Left 11/24/2014    Ligation of side branches x2 of left arm arteriovenous fistula, peructaneous angioplasty, left arm arteriovenous fistula-Dr. Collin Byers   • CARDIAC CATHETERIZATION N/A 10/27/2016    Procedure: Coronary angiography;  Surgeon: Jessie Espino MD;  Location: [facility] INVASIVE LOCATION;  Service:    • CARDIAC CATHETERIZATION N/A 10/27/2016    Procedure: Left Heart Cath;  Surgeon: Jessie Espino MD;  Location: [facility] CATH INVASIVE  LOCATION;  Service:    • CATARACT EXTRACTION W/ INTRAOCULAR LENS IMPLANT Bilateral    • INGUINAL HERNIA REPAIR Right 01/02/2015    Open Right Inguinal Hernia repair w/ mesh-Dr. Loi Gutierrez   • INSERTION PERITONEAL DIALYSIS CATHETER N/A 01/02/2015    Laparoscopic placement of pertoneal dialysis catheter-Dr. Loi Gutierrez   • PROSTATECTOMY N/A    • REMOVAL PERITONEAL DIALYSIS CATHETER N/A 3/2/2017    Procedure: REMOVAL PERITONEAL DIALYSIS CATHETER;  Surgeon: James Fisher MD;  Location: [facility] OR;  Service:    • UMBILICAL HERNIA REPAIR N/A 08/13/2015    Open Umbilical Hernia Repair-Dr. James Fisher     Family History   Problem Relation Age of Onset   • Heart attack Mother    • Heart disease Mother    • Heart attack Father    • Heart disease Father    • No Known Problems Sister    • No Known Problems Maternal Grandmother    • No Known Problems Maternal Grandfather    • No Known Problems Paternal Grandmother    • No Known Problems Paternal Grandfather    • No Known Problems Sister      Social History   Substance Use Topics   • Smoking status: Former Smoker     Packs/day: 1.50   • Smokeless tobacco: Never Used      Comment: quit 15 years ago   • Alcohol use 1.2 oz/week     2 Cans of beer per week      Comment: SELDOMLY     Prescriptions Prior to Admission   Medication Sig Dispense Refill Last Dose   • aspirin 81 MG tablet Take 1 tablet by mouth Daily. 30 tablet 11 7/1/2018 at Unknown time   • atorvastatin (LIPITOR) 40 MG tablet TAKE 1 TABLET BY MOUTH EVERY NIGHT. 30 tablet 3 7/1/2018 at Unknown time   • clopidogrel (PLAVIX) 75 MG tablet Take 75 mg by mouth Daily.   7/1/2018 at Unknown time   • folic acid (FOLVITE) 1 MG tablet Take 1 mg by mouth daily.   7/1/2018 at Unknown time   • metoprolol succinate XL (TOPROL-XL) 50 MG 24 hr tablet Take 1 tablet by mouth Daily. (Patient taking differently: Take 50 mg by mouth Every Morning.) 90 tablet 3 7/1/2018 at Unknown time   • Multiple Vitamins-Minerals  (RENAPLEX-D PO) Take 1 tablet/day by mouth Daily.   7/1/2018 at Unknown time   • sodium bicarbonate 650 MG tablet Take 650 mg by mouth 3 (Three) Times a Day. Packaging states 10 grain tablet   7/1/2018 at Unknown time   • Umeclidinium Bromide (INCRUSE ELLIPTA) 62.5 MCG/INH aerosol powder  Inhale 1 puff As Needed. No longer takes per pt.   Patient Taking Differently at Unknown time   • warfarin (COUMADIN) 2.5 MG tablet TAKE 1 TABLET BY MOUTH ON MONDAY & FRIDAY AND 1/2 TABLET ON ALL OTHER DAYS OR AS DIRECTED 30 tablet 0 Past Week at Unknown time   • warfarin (COUMADIN) 5 MG tablet TAKE ONE TABLET ON TUESDAY AND SATURDAY. TAKE 1/2 TABLET ALL OTHER DAYS OR AS DIRECTED 30 tablet 1 Patient Taking Differently at Unknown time   • acetaminophen (TYLENOL) 500 MG tablet Take 500 mg by mouth every 6 (six) hours as needed for mild pain (1-3).   Unknown at Unknown time   • warfarin (COUMADIN) 2.5 MG tablet Take 2.5 mg by mouth Daily. PT HOLDING FOR SURGERY   Taking     Allergies:  Patient has no known allergies.    Vital Signs  Temp:  [96.9 °F (36.1 °C)-97.6 °F (36.4 °C)] 97.6 °F (36.4 °C)  Heart Rate:  [] 123  Resp:  [16-22] 16  BP: ()/(37-97) 101/61    Physical Exam:  Constitutional: Not in acute distress.  Eyes: Injected conjunctiva, EOMI.  ENMT: Armando 3. No oral thrush. Tonsils grade 1  Heart: Irregular rhythm with tachycardia.  No audible murmur  Lungs: Slightly diminished air entry bilaterally.  No crackles or wheezing     Abdomen: Obese. Soft. No tenderness or dullness.  Extremities: No cyanosis, clubbing but grade 2 pitting edema. Moves all extremities.  Neuro: Conscious, alert, oriented x3  Psych: Appropriate mood and affect.    Integumentary: Bilateral ecchymosis in arms.  Left arm fistula  Lymphatic: No palpable cervical or supraclavicular lymph nodes.            Diagnostic imaging:  I personally and independently reviewed the following images:   Right pleural effusion (new compared to prior imaging in  2016).  Right lower lobe atelectasis.  Cardiomegaly          Assessment and Plan:  1. Lower gastrointestinal bleeding  2. Hemorrhagic shock  3. Acute blood loss anemia  4. Atrial fibrillation with RVR  5. Chronic anticoagulation with warfarin, aspirin and Plavix  6. Coumadin toxicity  7. Mild lactic acidosis, likely related to hypertension.  No sepsis.  8. New large right pleural effusion  9. Systolic congestive heart failure with mild exacerbation  10. ESRD on HD  11. COPD, no exacerbation  12. Pulmonary hypertension per echocardiogram, likely secondary to CHF and lung disease    -Admit to the intensive care unit for close monitoring  -Blood transfusion already administered.  Monitor blood pressure and treat transfuse if hypotension and also may need pressors.  H&H every 6 hours.  Repeat coags in the morning.  Ensure 2 large bore IV if needed for emesis transfusion.  -Obviously holding Coumadin and Plavix and aspirin.  Plavix may need to be discontinued indefinitely.  Repeat INR checked and was 1.4.  -Administer DDAVP for platelets dysfunction in the setting of chronic kidney disease  -GI consulted.  Awaiting recommendations  -Hemodialysis per nephrology  -Since INR is in normal range, we will get thoracentesis primarily for evaluation of the right pleural effusion which was likely related to CHF and chronic kidney disease.  Patient may not tolerate volume removal with dialysis due to hypotension  -Hold metoprolol due to hypotension.  If heart rate increases we may use a dose of digoxin  -Bronchodilators for COPD.  -Recheck lactic acid in a.m.              I discussed the patients findings and my recommendations with patient and nursing staff.     Raoul Dougherty MD  07/02/18  1:35 PM    Time: Critical care >45 min      This note was dictated utilizing [employer] dictation

## 2018-07-02 NOTE — CONSULTS
Referring Provider: Farhad Barreto MD  Reason for Consultation: Management of patient with end-stage renal disease    Subjective     Chief complaint   Chief Complaint   Patient presents with   • Rectal Bleeding       History of present illness:    This is a very pleasant 78-year-old  male who presented to the emergency department with the red rectal bleed started 2 days earlier, noted to have hemoglobin of 6.1, he is being given one units of packed RBCs now.  He has history of end-stage renal disease on maintenance hemodialysis every Monday, Wednesday and Friday under the care of Dr. Fili Santana.  His history of atrial fibrillation currently anticoagulated.  His chronic kidney disease and end-stage renal disease associated with the interstitial nephritis, previous prostatectomy for prostate cancer and hypertension also dyslipidemia history of COPD    He has been complaining of being weak and fatigued and also had dyspnea with minimal exertion, lower extremity edema, no nausea or vomiting, no abdominal pain but he has bright red blood per rectum.  No arthralgias or myalgias.  Past Medical History:   Diagnosis Date   • Anemia    • Anticoagulated on Coumadin    • Atrial fibrillation    • BPH (benign prostatic hyperplasia)    • CAD (coronary artery disease)    • CKD (chronic kidney disease)    • COPD (chronic obstructive pulmonary disease)    • Dialysis patient    • KENNEDY (dyspnea on exertion)    • ED (erectile dysfunction)    • End stage renal disease    • Fatigue    • Hyperlipidemia    • Hypertension    • Hypotension    • Prostate cancer    • Pulmonary emphysema    • Weakness    • Weight loss      Past Surgical History:   Procedure Laterality Date   • ANGIOPLASTY Left 11/24/2014    Left arm brachiocephalic arteriovenous fistula creation, Angioplasty of left cephalic vein, 5 mm x 150 mm balloon-Dr. Collin Byers   • ANGIOPLASTY Left 04/06/2015    Ligation of side brances x2 of Left Arm Arteriovenous  fistula, Percutaneous angioplasty, left arm arteriovenous fistula, Dr. Collin Byers   • AV FISTULA PLACEMENT, BRACHIOCEPHALIC Left 11/24/2014    Ligation of side branches x2 of left arm arteriovenous fistula, peructaneous angioplasty, left arm arteriovenous fistula-Dr. Collin Byers   • CARDIAC CATHETERIZATION N/A 10/27/2016    Procedure: Coronary angiography;  Surgeon: Jessie Espino MD;  Location: [facility] CATH INVASIVE LOCATION;  Service:    • CARDIAC CATHETERIZATION N/A 10/27/2016    Procedure: Left Heart Cath;  Surgeon: Jessie Espino MD;  Location: [facility] CATH INVASIVE LOCATION;  Service:    • CATARACT EXTRACTION W/ INTRAOCULAR LENS IMPLANT Bilateral    • INGUINAL HERNIA REPAIR Right 01/02/2015    Open Right Inguinal Hernia repair w/ mesh-Dr. Loi Gutierrez   • INSERTION PERITONEAL DIALYSIS CATHETER N/A 01/02/2015    Laparoscopic placement of pertoneal dialysis catheter-Dr. Loi Gutierrez   • PROSTATECTOMY N/A    • REMOVAL PERITONEAL DIALYSIS CATHETER N/A 3/2/2017    Procedure: REMOVAL PERITONEAL DIALYSIS CATHETER;  Surgeon: James Fisher MD;  Location: [facility] OR;  Service:    • UMBILICAL HERNIA REPAIR N/A 08/13/2015    Open Umbilical Hernia Repair-Dr. James Fisher     Family History   Problem Relation Age of Onset   • Heart attack Mother    • Heart disease Mother    • Heart attack Father    • Heart disease Father    • No Known Problems Sister    • No Known Problems Maternal Grandmother    • No Known Problems Maternal Grandfather    • No Known Problems Paternal Grandmother    • No Known Problems Paternal Grandfather    • No Known Problems Sister      Negative family history for kidney disease  Social History   Substance Use Topics   • Smoking status: Former Smoker     Packs/day: 1.50   • Smokeless tobacco: Never Used      Comment: quit 15 years ago   • Alcohol use 1.2 oz/week     2 Cans of beer per week      Comment: SELDOMLY       (Not in a hospital admission)  Allergies:  Patient has no  "known allergies.    Review of Systems  14 points review of system was performed and it was negative other than what noted above in the history of present illness    Objective     Vital Signs  Temp:  [97.5 °F (36.4 °C)] 97.5 °F (36.4 °C)  Heart Rate:  [105-125] 114  Resp:  [18-22] 18  BP: ()/(37-68) 114/65    Flowsheet Rows      First Filed Value   Admission Height  170.2 cm (67\") Documented at 07/02/2018 0753   Admission Weight  59 kg (130 lb) Documented at 07/02/2018 0753           I/O this shift:  In: 500 [IV Piggyback:500]  Out: -   No intake/output data recorded.    Intake/Output Summary (Last 24 hours) at 07/02/18 0952  Last data filed at 07/02/18 0907   Gross per 24 hour   Intake              500 ml   Output                0 ml   Net              500 ml       Physical Exam:  General Appearance: alert, oriented x 3, no acute distress, pierced to be chronically ill  Skin: warm and dry  HEENT: pupils round and reactive to light, oral mucosa normal,   Neck: supple, no JVD, trachea midline  Lungs: I lateral rhonchi, unlabored breathing effort  Heart: Irregularly irregular, no rub  Abdomen: soft, non-tender,  present bowel sounds to auscultation  : no palpable bladder,  Extremities: 2+ lower extremity edema, no cyanosis or clubbing, functional AV fistula in the left upper arm  Joints: no edema.  Deformities noted, no crepitation over the knees or the ankles  Lymphatics: No cervical or supraclavicular lymphadenopathy.  Neuro: normal speech and mental status    Results Review:  Results for orders placed or performed during the hospital encounter of 07/02/18   Comprehensive Metabolic Panel   Result Value Ref Range    Glucose 114 (H) 65 - 99 mg/dL    BUN 66 (H) 8 - 23 mg/dL    Creatinine 7.06 (H) 0.76 - 1.27 mg/dL    Sodium 137 136 - 145 mmol/L    Potassium 5.2 3.5 - 5.2 mmol/L    Chloride 94 (L) 98 - 107 mmol/L    CO2 25.2 22.0 - 29.0 mmol/L    Calcium 8.6 8.6 - 10.5 mg/dL    Total Protein 6.2 6.0 - 8.5 g/dL    " Albumin 3.50 3.50 - 5.20 g/dL    ALT (SGPT) 12 1 - 41 U/L    AST (SGOT) 10 1 - 40 U/L    Alkaline Phosphatase 76 39 - 117 U/L    Total Bilirubin 0.7 0.1 - 1.2 mg/dL    eGFR Non African Amer 8 (L) >60 mL/min/1.73    Globulin 2.7 gm/dL    A/G Ratio 1.3 g/dL    BUN/Creatinine Ratio 9.3 7.0 - 25.0    Anion Gap 17.8 mmol/L   Protime-INR   Result Value Ref Range    Protime 87.7 (C) 11.7 - 14.2 Seconds    INR >10.00 (C) 0.90 - 1.10   Troponin   Result Value Ref Range    Troponin T 0.161 (C) 0.000 - 0.030 ng/mL   Lactic Acid, Plasma   Result Value Ref Range    Lactate 2.9 (C) 0.5 - 2.0 mmol/L   CBC Auto Differential   Result Value Ref Range    WBC 4.58 4.50 - 10.70 10*3/mm3    RBC 1.79 (L) 4.60 - 6.00 10*6/mm3    Hemoglobin 6.1 (C) 13.7 - 17.6 g/dL    Hematocrit 18.7 (C) 40.4 - 52.2 %    .5 (H) 79.8 - 96.2 fL    MCH 34.1 (H) 27.0 - 32.7 pg    MCHC 32.6 32.6 - 36.4 g/dL    RDW 15.7 (H) 11.5 - 14.5 %    RDW-SD 59.5 (H) 37.0 - 54.0 fl    MPV 9.8 6.0 - 12.0 fL    Platelets 131 (L) 140 - 500 10*3/mm3    Neutrophil % 64.3 42.7 - 76.0 %    Lymphocyte % 23.4 19.6 - 45.3 %    Monocyte % 9.0 5.0 - 12.0 %    Eosinophil % 2.0 0.3 - 6.2 %    Basophil % 1.3 0.0 - 1.5 %    Immature Grans % 0.0 0.0 - 0.5 %    Neutrophils, Absolute 2.95 1.90 - 8.10 10*3/mm3    Lymphocytes, Absolute 1.07 0.90 - 4.80 10*3/mm3    Monocytes, Absolute 0.41 0.20 - 1.20 10*3/mm3    Eosinophils, Absolute 0.09 0.00 - 0.70 10*3/mm3    Basophils, Absolute 0.06 0.00 - 0.20 10*3/mm3    Immature Grans, Absolute 0.00 0.00 - 0.03 10*3/mm3    nRBC 0.0 0.0 - 0.0 /100 WBC   Scan Slide   Result Value Ref Range    Anisocytosis Mod/2+ None Seen    Ovalocytes Slight/1+ None Seen    Schistocytes Slight/1+ None Seen    Spherocytes Slight/1+ None Seen    WBC Morphology Normal Normal    Platelet Morphology Normal Normal   Type & Screen   Result Value Ref Range    ABO Type O     RH type Positive     Antibody Screen Negative     T&S Expiration Date 7/5/2018 11:59:59 PM     Prepare RBC, 1 Units   Result Value Ref Range    Product Code [id number]     Unit Number [id number]     UNIT  ABO O     UNIT  RH POS     Dispense Status IS     Blood Type OPOS     Blood Expiration Date 201807302359     Blood Type Barcode 5100      Imaging Results (last 72 hours)     Procedure Component Value Units Date/Time    XR Chest 1 View [[id number]] Updated:  07/02/18 0852              prothrombin complex conc human 3,000 Units Intravenous Once   And      phytonadione (VITAMIN K) IVPB 10 mg Intravenous Once   sodium chloride 500 mL Intravenous Once          Assessment/Plan   1.  End-stage renal disease associated with chronic interstitial nephritis, currently on hemodialysis 3 times a week, Monday, Wednesday and Friday.  2.  GI bleed with acute blood loss anemia, hemoglobin down to 6.1  3.  COPD  4.  Prior history of prostatectomy for prostate cancer  5.  History of hypertension  6.  Fluid excess  7.  Chronic A. fib, chronically anticoagulated  8.  Warfarin toxicity    Plan:  1.  Hemodialysis today and will transfuse patient with packed RBCs.  2.  Surveillance labs      Thank you for asking me to see this patient in consultation      I discussed the patients findings and my recommendations with the patient    Pollo Mccann MD  07/02/18  9:52 AM

## 2018-07-03 PROBLEM — D62 ACUTE BLOOD LOSS ANEMIA: Status: ACTIVE | Noted: 2018-01-01

## 2018-07-03 NOTE — PLAN OF CARE
Problem: Patient Care Overview  Goal: Plan of Care Review  Outcome: Ongoing (interventions implemented as appropriate)   07/03/18 0511   Coping/Psychosocial   Plan of Care Reviewed With patient   Plan of Care Review   Progress improving   OTHER   Outcome Summary No significant changes in pt condition noted overnight, pt had 1 bowel movement this shift, contained some bright red blood. Slightly hypotensive at times, pt denies any symptoms. No complaints voiced this shift. Planning for thoracentesis today. Hgb stable at last check. Will continue to monitor closely. CASTILLO Chinchilla RN       Problem: Fall Risk (Adult)  Goal: Identify Related Risk Factors and Signs and Symptoms  Outcome: Ongoing (interventions implemented as appropriate)   07/03/18 0511   Fall Risk (Adult)   Related Risk Factors (Fall Risk) age-related changes;gait/mobility problems;fatigue/slow reaction;environment unfamiliar   Signs and Symptoms (Fall Risk) presence of risk factors     Goal: Absence of Fall  Outcome: Ongoing (interventions implemented as appropriate)   07/03/18 0511   Fall Risk (Adult)   Absence of Fall making progress toward outcome       Problem: Gastrointestinal Bleeding (Adult)  Goal: Signs and Symptoms of Listed Potential Problems Will be Absent, Minimized or Managed (Gastrointestinal Bleeding)  Outcome: Ongoing (interventions implemented as appropriate)   07/03/18 0511   Goal/Outcome Evaluation   Problems Assessed (GI Bleeding) all   Problems Present (GI Bleeding) fluid imbalance;situational response

## 2018-07-03 NOTE — PROGRESS NOTES
[facility]  Inpatient Progress Note    Reason for Follow Up: GI bleed    Subjective     Interval History:   NO further bleeding.  Plan for thoracentesis today. No overnight events.      Current Facility-Administered Medications:   •  Hold medication, 1 each, Does not apply, Continuous PRN, Raoul Dougherty MD  •  ipratropium-albuterol (DUO-NEB) nebulizer solution 3 mL, 3 mL, Nebulization, Q6H - RT, Raoul Dougherty MD, 3 mL at 07/03/18 0637  •  lidocaine-prilocaine (EMLA) 2.5-2.5 % cream, , Topical, PRN, Pollo Mccann MD, 1 application at 07/02/18 1221  •  mannitol 25% (RENAL) injection, 12.5 g, Intravenous, PRN, Pollo Mccann MD  •  sodium bicarbonate tablet 650 mg, 650 mg, Oral, TID, Raoul Dougherty MD, 650 mg at 07/02/18 2037  •  sodium chloride 0.9 % bolus 1,000 mL, 1,000 mL, Intravenous, PRN, Pollo Mccann MD  •  sodium chloride 0.9 % flush 1-10 mL, 1-10 mL, Intravenous, PRN, Raoul Dougherty MD  Review of Systems:    The following systems were reviewed and negative;  respiratory and cardiovascular    Objective     Vital Signs  Temp:  [96.9 °F (36.1 °C)-98.5 °F (36.9 °C)] 98.4 °F (36.9 °C)  Heart Rate:  [] 118  Resp:  [16-22] 22  BP: ()/(24-97) 107/42  Body mass index is 19.54 kg/m².    Intake/Output Summary (Last 24 hours) at 07/03/18 0834  Last data filed at 07/03/18 0510   Gross per 24 hour   Intake          3049.37 ml   Output             3600 ml   Net          -550.63 ml     No intake/output data recorded.     Physical Exam:   General: patient awake, alert and cooperative   Eyes: Normal lids and lashes, no scleral icterus   Neck: supple, normal ROM   Skin: warm and dry, not jaundiced   Cardiovascular: a fib   Pulm: decreased bs right base   Abdomen: soft, nontender, mildly distended; normal bowel sounds   Rectal: deferred   Extremities: 1+ le edema   Psychiatric: Normal mood and behavior; memory intact     Results Review:     I reviewed the patient's new  clinical results.      Results from last 7 days  Lab Units 07/03/18  0527 07/02/18  2323 07/02/18  1755 07/02/18  0820   WBC 10*3/mm3  --  4.95  --  4.58   HEMOGLOBIN g/dL 8.3* 9.7* 9.8* 6.1*   HEMATOCRIT % 25.6* 29.5* 29.8* 18.7*   PLATELETS 10*3/mm3  --  102*  --  131*       Results from last 7 days  Lab Units 07/03/18  0528 07/02/18  0820   SODIUM mmol/L 140 137   POTASSIUM mmol/L 4.3 5.2   CHLORIDE mmol/L 100 94*   CO2 mmol/L 25.0 25.2   BUN mg/dL 33* 66*   CREATININE mg/dL 4.18* 7.06*   CALCIUM mg/dL 8.4* 8.6   BILIRUBIN mg/dL  --  0.7   ALK PHOS U/L  --  76   ALT (SGPT) U/L  --  12   AST (SGOT) U/L  --  10   GLUCOSE mg/dL 102* 114*       Results from last 7 days  Lab Units 07/03/18  0527 07/02/18 2323 07/02/18  1755   INR  1.41* 1.36* 1.42*     No results found for: LIPASE    Radiology:  XR Chest 1 View   Final Result      US Thoracentesis    (Results Pending)       Assessment/Plan     Patient Active Problem List   Diagnosis   • COPD (chronic obstructive pulmonary disease) (CMS/[facility])   • Chronic kidney disease   • Atrial fibrillation (CMS/[facility])   • Dyspnea   • Essential hypertension   • Hyperlipemia   • Coronary artery disease involving native coronary artery of native heart without angina pectoris   • Hemodialysis patient (CMS/[facility])   • S/P drug eluting coronary stent placement   • Cardiomyopathy (CMS/[facility])   • Rectal bleeding     Impression  1. Hematochezia: setting of supratherapeutic INR, occurring since 6/29  2. Acute blood loss anemia  3. Coagulopathy: INR ?10  4. A fib: on warfarin  5. ESRD: on dialysis  6. Sigmoid diverticulosis: as per 2015 imaging     Plan  -INR corrected - no further bleeding  - plan for egd/colonoscopy tomorrow for further evaluation - INR corrected - bleeding risk still elevated due to recent plavix  - continue to follow h/h - transfuse prn       I discussed the patients findings and my recommendations with patient and nursing staff.    Nilsa Arcos MD

## 2018-07-03 NOTE — PLAN OF CARE
Problem: Patient Care Overview  Goal: Plan of Care Review   07/03/18 0511   Coping/Psychosocial   Plan of Care Reviewed With patient   Plan of Care Review   Progress improving   OTHER   Outcome Summary No significant changes in pt condition noted overnight, pt had 1 bowel movement this shift, contained some bright red blood. Slightly hypotensive at times, pt denies any symptoms. No complaints voiced this shift. Planning for thoracentesis today. Hgb stable at last check. Will continue to monitor closely. CASTILLO Chinchilla RN     Goal: Individualization and Mutuality  Outcome: Ongoing (interventions implemented as appropriate)   07/03/18 1839   Individualization   Patient Specific Preferences goes by ronan     Goal: Discharge Needs Assessment  Outcome: Ongoing (interventions implemented as appropriate)   07/03/18 1802 07/03/18 1838   Discharge Needs Assessment   Readmission Within the Last 30 Days --  no previous admission in last 30 days;current reason for admission unrelated to previous admission   Concerns to be Addressed --  no discharge needs identified   Patient/Family Anticipates Transition to --  home   Patient/Family Anticipated Services at Transition none --    Transportation Concerns car, none --    Transportation Anticipated family or friend will provide --    Anticipated Changes Related to Illness none --    Equipment Needed After Discharge none --    Outpatient/Agency/Support Group Needs outpatient hemodialysis --    Patient's Choice of Community Agency(s) [address] --    Current Discharge Risk dependent with mobility/activities of daily living --    Disability   Equipment Currently Used at Home other (see comments)  (INR machine) --      Goal: Interprofessional Rounds/Family Conf  Outcome: Ongoing (interventions implemented as appropriate)   07/03/18 1837   Interdisciplinary Rounds/Family Conf   Participants ;dietitian/nutrition services;nursing;patient       Problem: Fall Risk  (Adult)  Goal: Identify Related Risk Factors and Signs and Symptoms  Outcome: Ongoing (interventions implemented as appropriate)   07/03/18 0511   Fall Risk (Adult)   Related Risk Factors (Fall Risk) age-related changes;gait/mobility problems;fatigue/slow reaction;environment unfamiliar   Signs and Symptoms (Fall Risk) presence of risk factors     Goal: Absence of Fall  Outcome: Ongoing (interventions implemented as appropriate)   07/03/18 1837   Fall Risk (Adult)   Absence of Fall making progress toward outcome       Problem: Gastrointestinal Bleeding (Adult)  Goal: Signs and Symptoms of Listed Potential Problems Will be Absent, Minimized or Managed (Gastrointestinal Bleeding)  Outcome: Ongoing (interventions implemented as appropriate)   07/03/18 0511 07/03/18 1839   Goal/Outcome Evaluation   Problems Assessed (GI Bleeding) --  (dark red blood in stool )   Problems Present (GI Bleeding) fluid imbalance;situational response --

## 2018-07-03 NOTE — PROGRESS NOTES
"                                              LOS: 1 day   Patient Care Team:  Uche Castelan MD as PCP - General  Uche Castelan MD as PCP - Family Medicine  Francisco Huynh DPM as PCP - Claims Attributed  James Fisher MD as Consulting Physician (General Surgery)  Jessie Espino MD as Consulting Physician (Cardiology)  Aundrea Denson, RN as Care Coordinator (Population [facility])    Chief Complaint:  Follow-up on GI bleeding, hemorrhagic shock, ICU management and medical problems listed below    Interval History:   No more hematochezia but had an episode of melena today.  He denies nausea or vomiting.  He tolerated liquid diet.  Feels better after the right thoracentesis    REVIEW OF SYSTEMS:   CARDIOVASCULAR: No chest pain, chest pressure or chest discomfort. No palpitations or edema.   RESPIRATORY: Shortness of breath on exertion but not at rest.  No cough  GASTROINTESTINAL: No anorexia, nausea, vomiting or diarrhea. No abdominal pain.  As above  HEMATOLOGIC: Bruising but no current sosa bleeding    Ventilator/Non-Invasive Ventilation Settings     None            Vital Signs  Temp:  [97.3 °F (36.3 °C)-98.5 °F (36.9 °C)] 98 °F (36.7 °C)  Heart Rate:  [] 117  Resp:  [16-22] 20  BP: ()/(24-91) 97/50    Intake/Output Summary (Last 24 hours) at 07/03/18 1308  Last data filed at 07/03/18 0510   Gross per 24 hour   Intake             1629 ml   Output             3600 ml   Net            -1971 ml     Flowsheet Rows      First Filed Value   Admission Height  170.2 cm (67\") Documented at 07/02/2018 0753   Admission Weight  59 kg (130 lb) Documented at 07/02/2018 0753          Physical Exam:   General Appearance:    Alert, cooperative, in no acute distress   Lungs:     Clear to auscultation,respirations regular, even and                  unlabored    Heart:    Regular rhythm and normal rate, normal S1 and S2, no            murmur, no gallop, no rub, no click   Chest Wall:    No abnormalities observed "   Abdomen:     Normal bowel sounds, no masses, no organomegaly, soft        non-tender, non-distended, no guarding, no rebound                tenderness   Neuro:   Conscious, alert, oriented x3   Extremities:   Moves all extremities well, no edema, no cyanosis, no             Redness          Results Review:          Results from last 7 days  Lab Units 07/03/18  0528 07/02/18  0820   SODIUM mmol/L 140 137   POTASSIUM mmol/L 4.3 5.2   CHLORIDE mmol/L 100 94*   CO2 mmol/L 25.0 25.2   BUN mg/dL 33* 66*   CREATININE mg/dL 4.18* 7.06*   GLUCOSE mg/dL 102* 114*   CALCIUM mg/dL 8.4* 8.6       Results from last 7 days  Lab Units 07/02/18  0820   TROPONIN T ng/mL 0.161*       Results from last 7 days  Lab Units 07/03/18  1204 07/03/18  0527 07/02/18  2323  07/02/18  0820   WBC 10*3/mm3  --   --  4.95  --  4.58   HEMOGLOBIN g/dL 8.4* 8.3* 9.7*  < > 6.1*   HEMATOCRIT % 25.3* 25.6* 29.5*  < > 18.7*   PLATELETS 10*3/mm3  --   --  102*  --  131*   < > = values in this interval not displayed.    Results from last 7 days  Lab Units 07/03/18  0527 07/02/18  2323 07/02/18  1755   INR  1.41* 1.36* 1.42*           I reviewed the patient's new clinical results.  I personally viewed and interpreted the patient's CXR        Medication Review:     bisacodyl 10 mg Oral Once   [START ON 7/4/2018] epoetin ariel 10,000 Units Intravenous Once per day on Mon Wed Fri   ipratropium-albuterol 3 mL Nebulization Q6H - RT   pantoprazole 80 mg Intravenous Once   polyethylene glycol with electrolytes 2,000 mL Oral BID   sodium bicarbonate 650 mg Oral TID         hold 1 each   pantoprazole 8 mg/hr       Diagnostic imaging:  I personally and independently reviewed the following images:   Right pleural effusion (new compared to prior imaging in 2016).  Right lower lobe atelectasis.  Cardiomegaly             Assessment:  1. Hematochezia On presentation  2. Melena today  3. Hemorrhagic shock, resolved  4. Acute blood loss anemia  5. Atrial fibrillation with  RVR  6. Chronic anticoagulation with warfarin, aspirin and Plavix  7. Coumadin toxicity  8. Mild lactic acidosis, likely related to hypertension.  No sepsis.  9. Large right pleural effusion, s/p right thora 7/3/18 --> 1900 ml, Transudate of by LDH, awaiting serum protein comparison  10. Systolic congestive heart failure with mild exacerbation  11. ESRD on HD  12. COPD, no exacerbation  13. Pulmonary hypertension per echocardiogram, likely secondary to CHF and lung disease     Plan:  -Continue serial H&H today  -PPI drip  -Blood transfusion when necessary for hemoglobin less than 8  -Check serum proteins to compare to pleural fluid protein  -Coumadin, aspirin and Plavix on hold.  Plavix may need to be discontinued indefinitely.    -Hemodialysis per nephrology  -EGD and colonoscopy tomorrow by GI  -DVT prophylaxis with SCD.      Discussed with ICU team.         Raoul Dougherty MD  07/03/18  1:08 PM            This note was dictated utilizing [employer]ik dictation

## 2018-07-03 NOTE — PLAN OF CARE
Problem: Patient Care Overview  Goal: Individualization and Mutuality  Outcome: Ongoing (interventions implemented as appropriate)   07/03/18 5277   Individualization   Patient Specific Preferences goes by ronan

## 2018-07-03 NOTE — PROGRESS NOTES
"   LOS: 1 day    Patient Care Team:  Uche Castelan MD as PCP - General  Uche Castelan MD as PCP - Family Medicine  Francisco Huynh DPM as PCP - Claims Attributed  James Estuardo Fisher MD as Consulting Physician (General Surgery)  Jessie Espino MD as Consulting Physician (Cardiology)  Aundrea Denson, RN as Care Coordinator (Population Health)    Chief Complaint:    Chief Complaint   Patient presents with   • Rectal Bleeding     Follow up ESRD.   Subjective     Interval History:   Coughing After thoracentesis on right 1900 cc.   Review of Systems:   Cough.  Not soa unless coughing. Clear liquid for prep.  Pain with cough.      Objective     Vital Signs  Temp:  [97.3 °F (36.3 °C)-98.5 °F (36.9 °C)] 98 °F (36.7 °C)  Heart Rate:  [] 120  Resp:  [16-22] 22  BP: ()/(24-91) 97/47    Flowsheet Rows      First Filed Value   Admission Height  170.2 cm (67\") Documented at 07/02/2018 0753   Admission Weight  59 kg (130 lb) Documented at 07/02/2018 0753          No intake/output data recorded.  I/O last 3 completed shifts:  In: 3049.4 [P.O.:960; I.V.:69; Blood:970.4; IV Piggyback:1050]  Out: 3600 [Other:3600]    Intake/Output Summary (Last 24 hours) at 07/03/18 1157  Last data filed at 07/03/18 0510   Gross per 24 hour   Intake          1999.37 ml   Output             3600 ml   Net         -1600.63 ml       Physical Exam:  Chronically ill.  Lying in bed.  Coughing. Heart Tachy, irreg, irreg.  Lungs decreased bs bases, R> L.  Abd +bs soft, nontender.  Ext no edema.  LUE AVF patent.   SKin extensive ecchymoses over arms.       Results Review:      Results from last 7 days  Lab Units 07/03/18  0528 07/02/18  0820   SODIUM mmol/L 140 137   POTASSIUM mmol/L 4.3 5.2   CHLORIDE mmol/L 100 94*   CO2 mmol/L 25.0 25.2   BUN mg/dL 33* 66*   CREATININE mg/dL 4.18* 7.06*   CALCIUM mg/dL 8.4* 8.6   BILIRUBIN mg/dL  --  0.7   ALK PHOS U/L  --  76   ALT (SGPT) U/L  --  12   AST (SGOT) U/L  --  10   GLUCOSE mg/dL 102* 114* "       Estimated Creatinine Clearance: 11.7 mL/min (A) (by C-G formula based on SCr of 4.18 mg/dL (H)).      Results from last 7 days  Lab Units 07/03/18  0528   PHOSPHORUS mg/dL 3.5               Results from last 7 days  Lab Units 07/03/18  0527 07/02/18  2323 07/02/18  1755 07/02/18  0820   WBC 10*3/mm3  --  4.95  --  4.58   HEMOGLOBIN g/dL 8.3* 9.7* 9.8* 6.1*   PLATELETS 10*3/mm3  --  102*  --  131*         Results from last 7 days  Lab Units 07/03/18  0527 07/02/18  2323 07/02/18  1755 07/02/18  1140 07/02/18  0820   INR  1.41* 1.36* 1.42* 1.44* >10.00*         Imaging Results (last 24 hours)     Procedure Component Value Units Date/Time    US Thoracentesis [[id number]] Collected:  07/03/18 1140    Specimen:  Body Fluid Updated:  07/03/18 1143    Narrative:       US THORACENTESIS-     HISTORY: pleural effusion.     PROCEDURE: Ultrasound guided right thoracentesis     Procedure: Informed consent was obtained. Preliminary sonography  performed. Pleural effusion demonstrated. The overlying skin was prepped  in the usual sterile fashion. Local anesthesia was achieved with 1%  lidocaine. A small nick was made in the skin with a scalpel. Through the  nick was inserted a needle catheter device under sonographic guidance.  The needle was removed and the catheter remained and was connected to  suction. 1950 cc of ezekiel fluid was withdrawn. The patient tolerated the  procedure without evidence for complication and left the procedure room  in stable condition.       Impression:       1. Technically successful ultrasound guided thoracentesis.     This report was finalized on 7/3/2018 11:40 AM by Dr. Valentino Quintana M.D.       XR Chest 1 View [[id number]] Collected:  07/02/18 0954     Updated:  07/02/18 1552    Narrative:       PORTABLE CHEST     HISTORY: Shortness of air.     FINDINGS:  A single view of the chest demonstrates mild cardiomegaly.  There is minimal blunting of the costophrenic angle on the left. There  is no  evidence of infiltrate on the left. On the right, a large pleural  effusion is appreciated with a suggestion of right lower lobe  atelectasis.     This report was finalized on 7/2/2018 3:49 PM by Dr. Lennox Mendosa M.D.             bisacodyl 10 mg Oral Once   ipratropium-albuterol 3 mL Nebulization Q6H - RT   pantoprazole 80 mg Intravenous Once   polyethylene glycol with electrolytes 2,000 mL Oral BID   sodium bicarbonate 650 mg Oral TID       hold 1 each   pantoprazole 8 mg/hr       Medication Review:   Current Facility-Administered Medications   Medication Dose Route Frequency Provider Last Rate Last Dose   • bisacodyl (DULCOLAX) EC tablet 10 mg  10 mg Oral Once Nilsa Arcos MD       • Hold medication  1 each Does not apply Continuous PRN Raoul Dougherty MD       • ipratropium-albuterol (DUO-NEB) nebulizer solution 3 mL  3 mL Nebulization Q6H - RT Raoul Dougherty MD   3 mL at 07/03/18 1059   • lidocaine-prilocaine (EMLA) 2.5-2.5 % cream   Topical PRN Pollo Mccann MD   1 application at 07/02/18 1221   • mannitol 25% (RENAL) injection  12.5 g Intravenous PRN Pollo Mccann MD       • pantoprazole (PROTONIX) 40 mg/100 mL (0.4 mg/mL) in 0.9% NS IVPB  8 mg/hr Intravenous Continuous LINDA Rand       • pantoprazole (PROTONIX) injection 80 mg  80 mg Intravenous Once LINDA Rand       • polyethylene glycol with electrolytes (GOLYTELY) solution 2,000 mL  2,000 mL Oral BID Nilsa Arcos MD       • sodium bicarbonate tablet 650 mg  650 mg Oral TID Raoul Dougherty MD   650 mg at 07/03/18 0846   • sodium chloride 0.9 % flush 1-10 mL  1-10 mL Intravenous PRN Raoul Dougherty MD           Assessment/Plan      1.  End-stage renal disease associated with chronic interstitial nephritis, Dialysis tomorrow.  Will work around GI scopes.   2.  GI bleed with acute blood loss anemia, transfuse prn.  Volume and K can tolerate.   3.  COPD  4.  Prior history of prostatectomy for prostate cancer  5.   HTN now hypotensive.   6.  Fluid excess better  7.  Chronic A. fib, chronically anticoagulated  8.  Warfarin toxicity. INR corrected.   9. Pleural effusion.  SP thoracentesis today. 1900 cc.           Dimple Wayne MD  07/03/18  11:57 AM

## 2018-07-03 NOTE — CONSULTS
Adult Nutrition  Assessment/PES    Patient Name:  Juan Diego Toure  YOB: 1940  MRN: [id number]  Admit Date:  7/2/2018    Assessment Date:  7/3/2018    Comments:  Nutrition assessment complete. Pt reports he has been thin for a few years now. Struggles to gain his weight back . Good appetite. Agreeable to try some Nepro.  On clears so will begin once diet adv.  Will follow.          Reason for Assessment     Row Name 07/03/18 0943          Reason for Assessment    Reason For Assessment nurse/nurse practitioner consult     Diagnosis hematological/related complications;renal disease   Hematochezia, Anemia, ESRD-HD, Diverticulosis     Identified At Risk by Screening Criteria MST SCORE 2+             Nutrition/Diet History     Row Name 07/03/18 1047          Nutrition/Diet History    Typical Food/Fluid Intake good appetite - struggles to gain weight             Anthropometrics     Row Name 07/03/18 0945 07/03/18 0530       Anthropometrics    Weight  -- 56.6 kg (124 lb 12.5 oz)       Admit Weight    Admit Weight 56.6 kg (124 lb 12.5 oz)  --       Usual Body Weight (UBW)    Usual Body Weight 79.4 kg (175 lb)   5 years ago before starting dialysis  --    Weight Loss unintentional  --       Body Mass Index (BMI)    BMI Assessment BMI 18.5-24.9: normal  --            Labs/Tests/Procedures/Meds     Row Name 07/03/18 0910          Labs/Procedures/Meds    Lab Results Reviewed reviewed, pertinent     Lab Results Comments BUN, Cr, plts, INR, Hgb        Diagnostic Tests/Procedures    Diagnostic Test/Procedure Reviewed reviewed, pertinent     Diagnostic Test/Procedures Comments egd/colonoscopy tomorrow         Medications    Pertinent Medications Reviewed reviewed, pertinent     Pertinent Medications Comments dulcolax             Physical Findings     Row Name 07/03/18 0937          Physical Findings    Skin other (see comments)   bruise             Estimated/Assessed Needs     Row Name 07/03/18 0924           Estimated/Assessed Needs    Additional Documentation --   1698 kcals (30/kg), 68g pro (1.2/kg), 1700cc fluid             Nutrition Prescription Ordered     Row Name 07/03/18 0956          Nutrition Prescription PO    Current PO Diet Clear Liquid             Evaluation of Received Nutrient/Fluid Intake     Row Name 07/03/18 0956          PO Evaluation    Number of Meals 2     % PO Intake %       Problem/Interventions:        Problem 1     Row Name 07/03/18 1046          Nutrition Diagnoses Problem 1    Problem 1 Increased Nutrient Needs     Macronutrient Kcal     Etiology (related to) Medical Diagnosis     Renal ESRD;Hemodialysis     Signs/Symptoms (evidenced by) BMI     BMI 19 - 19.9               Intervention Goal     Row Name 07/03/18 1047          Intervention Goal    General Maintain nutrition;Reduce/improve symptoms;Meet nutritional needs for age/condition;Disease management/therapy;Improved nutrition related lab(s)     PO Tolerate PO;PO intake (%)     PO Intake % 100 %     Weight Appropriate weight gain             Nutrition Intervention     Row Name 07/03/18 1048          Nutrition Intervention    RD/Tech Action Follow Tx progress;Care plan reviewd;Encourage intake;Interview for preference;Recommend/ordered     Recommended/Ordered Supplement             Nutrition Prescription     Row Name 07/03/18 1048          Nutrition Prescription PO    PO Prescription Begin/change supplement     Supplement Nepro     Supplement Frequency 2 times a day     New PO Prescription Ordered? Yes             Education/Evaluation     Row Name 07/03/18 1048          Education    Education Will Instruct as appropriate        Monitor/Evaluation    Monitor Per protocol;Symptoms;I&O;PO intake;Supplement intake;Pertinent labs;Weight;Skin status         Electronically signed by:  Jacqueline Malagon RD  07/03/18 10:48 AM

## 2018-07-03 NOTE — PLAN OF CARE
Problem: Gastrointestinal Bleeding (Adult)  Goal: Signs and Symptoms of Listed Potential Problems Will be Absent, Minimized or Managed (Gastrointestinal Bleeding)  Outcome: Ongoing (interventions implemented as appropriate)

## 2018-07-03 NOTE — PROGRESS NOTES
Discharge Planning Assessment  [facility]     Patient Name: Juan Diego Toure  MRN: [id number]  Today's Date: 7/3/2018    Admit Date: 7/2/2018          Discharge Needs Assessment     Row Name 07/03/18 1802       Living Environment    Lives With alone    Current Living Arrangements home/apartment/condo    Primary Care Provided by self    Provides Primary Care For no one    Family Caregiver if Needed none    Quality of Family Relationships supportive    Able to Return to Prior Arrangements yes       Resource/Environmental Concerns    Transportation Concerns car, none       Transition Planning    Patient/Family Anticipates Transition to home    Patient/Family Anticipated Services at Transition none    Transportation Anticipated family or friend will provide       Discharge Needs Assessment    Readmission Within the Last 30 Days no previous admission in last 30 days    Equipment Currently Used at Home other (see comments)   INR machine    Anticipated Changes Related to Illness none    Equipment Needed After Discharge none    Outpatient/Agency/Support Group Needs outpatient hemodialysis    Patient's Choice of Community Agency(s) [facility].    Current Discharge Risk dependent with mobility/activities of daily living            Discharge Plan     Row Name 07/03/18 1801       Plan    Plan Home with family support.  Outpt HD at [facility] on [facility] on MWF.    Patient/Family in Agreement with Plan yes    Plan Comments CCP spoke with pt and sister in law Zavala at bedside.  Introduced self and explained role. CCP contact information provided.  Face sheet and pharmacy verified and IMM received.  Pt lives alone and is independent with ADLs.  He drives himself to HD each MWF at [facility] on [facility].  Pt states that Sally Guerrero is his choice for healthcare surrogate and he would like to have it notarized while he is here.  Order placed for Advanced Care Planning.   Form given to pt for review.  Pt denies  any dc needs at this time.  He denies any prior use of HH or SNF.  Plan is home.  CCP will follow.        Destination     No service coordination in this encounter.      Durable Medical Equipment     No service coordination in this encounter.      Dialysis/Infusion     No service coordination in this encounter.      Home Medical Care     No service coordination in this encounter.      Social Care     No service coordination in this encounter.                Demographic Summary     Row Name 07/03/18 1806       General Information    Admission Type inpatient    Arrived From home    Referral Source admission list    Reason for Consult discharge planning    Preferred Language English     Used During This Interaction no       Contact Information    Permission Granted to Share Info With family/designee            Functional Status     Row Name 07/03/18 1800       Functional Status    Usual Activity Tolerance good    Current Activity Tolerance moderate       Functional Status, IADL    Medications independent    Meal Preparation independent    Housekeeping independent    Laundry independent    Shopping independent       Mental Status    General Appearance WDL WDL       Mental Status Summary    Recent Changes in Mental Status/Cognitive Functioning no changes       Employment/    Employment Status retired            Psychosocial    No documentation.           Abuse/Neglect    No documentation.           Legal    No documentation.           Substance Abuse    No documentation.           Patient Forms    No documentation.         Ana Landry RN

## 2018-07-03 NOTE — PLAN OF CARE
Problem: Fall Risk (Adult)  Goal: Identify Related Risk Factors and Signs and Symptoms  Outcome: Ongoing (interventions implemented as appropriate)   07/03/18 2997   Fall Risk (Adult)   Related Risk Factors (Fall Risk) age-related changes;gait/mobility problems;fatigue/slow reaction;environment unfamiliar   Signs and Symptoms (Fall Risk) presence of risk factors

## 2018-07-03 NOTE — PLAN OF CARE
Problem: Patient Care Overview  Goal: Discharge Needs Assessment  Outcome: Ongoing (interventions implemented as appropriate)   07/03/18 1802 07/03/18 1838   Discharge Needs Assessment   Readmission Within the Last 30 Days --  no previous admission in last 30 days;current reason for admission unrelated to previous admission   Concerns to be Addressed --  no discharge needs identified   Patient/Family Anticipates Transition to --  home   Patient/Family Anticipated Services at Transition none --    Transportation Concerns car, none --    Transportation Anticipated family or friend will provide --    Anticipated Changes Related to Illness none --    Equipment Needed After Discharge none --    Patient's Choice of Community Agency(s) [address] --    Current Discharge Risk dependent with mobility/activities of daily living --    Disability   Equipment Currently Used at Home other (see comments)  (INR machine) --

## 2018-07-03 NOTE — PLAN OF CARE
Problem: Fall Risk (Adult)  Goal: Absence of Fall  Outcome: Ongoing (interventions implemented as appropriate)   07/03/18 7665   Fall Risk (Adult)   Absence of Fall making progress toward outcome

## 2018-07-04 NOTE — PROCEDURES
Endotracheal Intubation    Indications:   - Respiratory failure      A time-out was completed verifying correct patient, procedure, site, positioning, and special equipment if applicable. The patient was placed in a flat position. Sedation was obtained using Propofol 10 mg. The patient was easily ventilated using an ambu bag. The [employer] was used and inserted into the oropharynx at which time there was a Grade 1 view of the vocal cords. A 7.5-Zambian endotracheal tube was inserted and visualized going through the vocal cords. The stylette was removed. Colorimetric change was visualized on the CO2 meter. Breath sounds were heard in both lung fields equally. The endotracheal tube was placed at 24 cm, measured at the teeth. Tube position was later confirmed by the bronchoscope.    Estimated Blood Loss: None    The patient tolerated the procedure well and there were no complications.

## 2018-07-04 NOTE — PROCEDURES
Bronchoscopy with bronchial wash    Indications:  Respiratory failure.  Aspiration. Need to confirm diagnosis and perform therapeutic lavage.    Sedation:  None (patient already received Propofol 10 mg)    Procedure note:  Procedure was emergent.  Consent was not obtained.    Time out was performed.    The bronchoscope was inserted into the airway through the endotracheal tube. ET tube was in good position.  The tracheobronchial tree was examined at the level of segmental and subsegmental bronchi with the finding of: Frothy,  large secretions bilaterally which were suctioned at best.    Estimated blood loss was: None  The patient tolerated the procedure very well without immediate complications.

## 2018-07-04 NOTE — PROGRESS NOTES
"                                              LOS: 2 days   Patient Care Team:  Uhce Castelan MD as PCP - General  Uche Castelan MD as PCP - Family Medicine  Farncisco Huynh DPM as PCP - Claims Attributed  James Fisher MD as Consulting Physician (General Surgery)  Jessie Espino MD as Consulting Physician (Cardiology)  Aundrea Denson, RN as Care Coordinator ([facility])    Chief Complaint:  Follow-up on GI bleeding, hemorrhagic shock, ICU management and medical problems listed below    Interval History:   Had EGD today.  Apparently he vomited during the procedure.  Reportedly, the procedure was inconclusive due to significant emesis.  The procedure, the patient decompensated gradually.  His blood pressure dropped and he became tachycardic.  It was initially thought that it's related to the sedatives and 1 L of IV fluid bolus was administered.   He became hypoxic as well.  He was on room air prior to the procedure.  His oxygen requirement continued to increase and he was in severe respiratory distress requiring intubation.      REVIEW OF SYSTEMS:   Could not obtain full review of system due to severity of his illness, decompensation and also encephalopathy    Ventilator/Non-Invasive Ventilation Settings     None            Vital Signs  Temp:  [97.9 °F (36.6 °C)-98.5 °F (36.9 °C)] 97.9 °F (36.6 °C)  Heart Rate:  [] 126  Resp:  [20-22] 20  BP: ()/(25-87) 91/52    Intake/Output Summary (Last 24 hours) at 07/04/18 1024  Last data filed at 07/04/18 0513   Gross per 24 hour   Intake             1490 ml   Output              250 ml   Net             1240 ml     Flowsheet Rows      First Filed Value   Admission Height  170.2 cm (67\") Documented at 07/02/2018 0753   Admission Weight  59 kg (130 lb) Documented at 07/02/2018 0753          Physical Exam:   General Appearance:    Tachypneic and in severe respiratory distress    Lungs:     Bilateral coarse crackles.  No wheezing     Heart:    " Irregular rhythm and tachycardia.  No murmur    Chest Wall:    No abnormalities observed   Abdomen:     Slightly distended.  Soft.  No tenderness    Neuro:   Conscious, moves all extremities.     Extremities:   Moves all extremities well, Trace edema, no cyanosis, no             Redness          Results Review:          Results from last 7 days  Lab Units 07/04/18  0540 07/03/18  0528 07/02/18  0820   SODIUM mmol/L 135* 140 137   POTASSIUM mmol/L 4.1 4.3 5.2   CHLORIDE mmol/L 95* 100 94*   CO2 mmol/L 25.0 25.0 25.2   BUN mg/dL 45* 33* 66*   CREATININE mg/dL 4.89* 4.18* 7.06*   GLUCOSE mg/dL 111* 102* 114*   CALCIUM mg/dL 8.3* 8.4* 8.6       Results from last 7 days  Lab Units 07/02/18  0820   TROPONIN T ng/mL 0.161*       Results from last 7 days  Lab Units 07/04/18  0540 07/04/18  0046 07/03/18  1806  07/02/18  2323  07/02/18  0820   WBC 10*3/mm3 6.21  --   --   --  4.95  --  4.58   HEMOGLOBIN g/dL 8.5*  8.5* 7.7* 9.4*  < > 9.7*  < > 6.1*   HEMATOCRIT % 26.3*  26.3* 23.3* 29.2*  < > 29.5*  < > 18.7*   PLATELETS 10*3/mm3 113*  --   --   --  102*  --  131*   < > = values in this interval not displayed.    Results from last 7 days  Lab Units 07/04/18  0540 07/03/18  0527 07/02/18  2323   INR  1.35* 1.41* 1.36*           I reviewed the patient's new clinical results.  I personally viewed and interpreted the patient's CXR        Medication Review:     epoetin ariel 10,000 Units Intravenous Once per day on Mon Wed Fri   ipratropium-albuterol 3 mL Nebulization Q6H - RT   sodium bicarbonate 650 mg Oral TID   sodium chloride 500 mL Intravenous Once         hold 1 each    pantoprazole 8 mg/hr Last Rate: 8 mg/hr (07/04/18 0541)       Diagnostic imaging:  I personally and independently reviewed the following images:   Diffuse bilateral infiltrates with vascular congestion.  Right lower lobe consolidation and effusion             Assessment:  1. Aspiration pneumonia/pneumonitis  2. Acute hypoxic and hypercapnic respiratory  failure, secondary to #1 and pulmonary vascular congestion  3. Pulmonary vascular congestion  4. Shock, to soon to be septic but possible.  He has reduced EF as well.  Could be a combination of heart failure and sepsis  5. Atrial fibrillation with RVR, rate WORSE  6. Persistent Large right pleural effusion, s/p right thora 7/3/18 --> 1900 ml, exudative  7. Hematochezia On presentation  8. Melena On 7/3/18  9. Hemorrhagic shock, on presentation  10. Acute blood loss anemia  11. Chronic anticoagulation with warfarin, aspirin and Plavix  12. Coumadin toxicity  13. Mild lactic acidosis, likely related to hypertension.  No sepsis.  14. Systolic congestive heart failure with mild exacerbation  15. ESRD on HD  16. COPD, no exacerbation  17. Pulmonary hypertension per echocardiogram, likely secondary to CHF and lung disease     Plan:  -Stat chest x-ray obtained and reviewed as above.  Patient was Intubated and placed on the ventilator.  Bronchoscopy performed and bilious secretions suctioned from the airway.  Stat ABG obtained on the ventilator and ventilator settings adjusted..    -Central line placed.  Will start pressors with Khai-Synephrine due to tachycardia    -Bedside ultrasound performed.  IVC noted to be dilated and not collapsible.  RV is dilated.  Left systolic function is normal to slightly reduced.  Large right pleural effusion with evidence of mild septation the bases.    -Start empiric antibiotic therapy.  Check blood culture.  He can wash culture.  Check serial lactic acid    -Digoxin 0.5 milligrams for A. fib with RVR.      -PPI drip    -Blood transfusion when necessary for hemoglobin less than 8    -Coumadin, aspirin and Plavix on hold.  Plavix may need to be discontinued indefinitely.      -Hemodialysis per nephrology    -We'll place a chest tube tomorrow.  Check pleural fluid culture.        Discussed with ICU team. Dr. Wayne.         Raoul Dougherty MD  07/04/18  10:24 AM    Addendum:  Patient is getting  worse.  Blood pressure is worsening despite Khai-Synephrine.  Levophed was added and maxed.  Blood pressure remains low.  Cannot administer IV fluids due to his CHF and pulmonary edema.  Chest x-ray post-line placement was reviewed by me and it revealed pulmonary congestion, right pleural effusion and bilateral pulmonary infiltrates.  Patient is likely going into septic and cardiogenic shock.  Likely blood cell count was noted to be low.  On exam he has bilateral coarse crackles on his tachypnea.  He is sedated with propofol and Precedex.    Assessment:  1. Worsening septic & cardiogenic shock  2. Afib w RVR, uncontrolled    Plan:  Add vasopressin.  Check cardiac enzymes.  And high-dose steroids.  Check echocardiogram.  Gave digoxin 0.5 mg then amiodarone 150 mg bolus followed by amiodarone drip but heart rate remained significantly elevated.  We'll consult cardiology for heart rate control since it may be contributing to the hypotension.  May need electric cardioversion.    Discussed with the son (power of ) and his sister in law over the phone.  They both requested DNR.     Total critical care time today and 65 minutes Excluding  separately  billed procedures.        This note was dictated utilizing Saranason dictation

## 2018-07-04 NOTE — CONSULTS
Date of Hospital Visit:18  Encounter Provider: Damian Delgado MD  Place of Service: [facility] CARDIOLOGY  Patient Name: Juan Diego Toure  :1940  Referral Provider: Raoul Dougherty MD    Chief complaint: Atrial fib    History of Present Illness:  This patient is a 70-year-old gentleman unable to really give a history of a ventilator but in marked distress markedly hypotensive on multiple pressors he has a history of shortness of breath, he has history of coronary disease previous drug-eluting stent placement in 2016 to the circumflex also has chronic age fibrillation rate controlled on warfarin cardiomyopathy with left ventricle ejection fraction of 42% history of hyperlipidemia COPD end-stage renal disease on dialysis and PVCs.  He now comes in with bleeding GI bleeding initial hemoglobin around 6 and went for upper endoscopy today.  I also aspirated since and his blood pressures been markedly depressed with systolics in the 60 on Khai-Synephrine, vasopressin, epinephrine and then went into rapid atrial fibrillation chronically that but her rate increased dramatically.  No fever with this and no other complaints.          Past Medical History:   Diagnosis Date   • Anemia    • Anticoagulated on Coumadin    • Atrial fibrillation (CMS/HCC)    • BPH (benign prostatic hyperplasia)    • CAD (coronary artery disease)    • CKD (chronic kidney disease)    • COPD (chronic obstructive pulmonary disease) (CMS/HCC)    • Dialysis patient (CMS/HCC)    • KENNEDY (dyspnea on exertion)    • ED (erectile dysfunction)    • End stage renal disease (CMS/HCC)    • Fatigue    • Hyperlipidemia    • Hypertension    • Hypotension    • Prostate cancer (CMS/HCC)    • Pulmonary emphysema (CMS/HCC)    • Weakness    • Weight loss        Past Surgical History:   Procedure Laterality Date   • ANGIOPLASTY Left 2014    Left arm brachiocephalic arteriovenous fistula creation, Angioplasty of left  cephalic vein, 5 mm x 150 mm balloon-Dr. Collin Byers   • ANGIOPLASTY Left 04/06/2015    Ligation of side brances x2 of Left Arm Arteriovenous fistula, Percutaneous angioplasty, left arm arteriovenous fistula, Dr. Collin Byers   • AV FISTULA PLACEMENT, BRACHIOCEPHALIC Left 11/24/2014    Ligation of side branches x2 of left arm arteriovenous fistula, peructaneous angioplasty, left arm arteriovenous fistula-Dr. Collin Byers   • CARDIAC CATHETERIZATION N/A 10/27/2016    Procedure: Coronary angiography;  Surgeon: Jessie Espino MD;  Location:  [facility] CATH INVASIVE LOCATION;  Service:    • CARDIAC CATHETERIZATION N/A 10/27/2016    Procedure: Left Heart Cath;  Surgeon: Jessie Espino MD;  [facility];  Service:    • CATARACT EXTRACTION W/ INTRAOCULAR LENS IMPLANT Bilateral    • INGUINAL HERNIA REPAIR Right 01/02/2015    Open Right Inguinal Hernia repair w/ mesh-Dr. Loi Gutierrez   • INSERTION PERITONEAL DIALYSIS CATHETER N/A 01/02/2015    Laparoscopic placement of pertoneal dialysis catheter-Dr. Loi Gutierrez   • PROSTATECTOMY N/A    • REMOVAL PERITONEAL DIALYSIS CATHETER N/A 3/2/2017    Procedure: REMOVAL PERITONEAL DIALYSIS CATHETER;  Surgeon: James Fisher MD;  Location: [facility] OR;  Service:    • UMBILICAL HERNIA REPAIR N/A 08/13/2015    Open Umbilical Hernia Repair-Dr. James Fisher       No current facility-administered medications on file prior to encounter.      Current Outpatient Prescriptions on File Prior to Encounter   Medication Sig Dispense Refill   • aspirin 81 MG tablet Take 1 tablet by mouth Daily. 30 tablet 11   • atorvastatin (LIPITOR) 40 MG tablet TAKE 1 TABLET BY MOUTH EVERY NIGHT. 30 tablet 3   • folic acid (FOLVITE) 1 MG tablet Take 1 mg by mouth daily.     • metoprolol succinate XL (TOPROL-XL) 50 MG 24 hr tablet Take 1 tablet by mouth Daily. (Patient taking differently: Take 50 mg by mouth Every Morning.) 90 tablet 3   • Multiple  Vitamins-Minerals (RENAPLEX-D PO) Take 1 tablet/day by mouth Daily.     • sodium bicarbonate 650 MG tablet Take 650 mg by mouth 3 (Three) Times a Day. Packaging states 10 grain tablet     • Umeclidinium Bromide (INCRUSE ELLIPTA) 62.5 MCG/INH aerosol powder  Inhale 1 puff As Needed. No longer takes per pt.     • warfarin (COUMADIN) 2.5 MG tablet TAKE 1 TABLET BY MOUTH ON MONDAY & FRIDAY AND 1/2 TABLET ON ALL OTHER DAYS OR AS DIRECTED 30 tablet 0   • warfarin (COUMADIN) 5 MG tablet TAKE ONE TABLET ON TUESDAY AND SATURDAY. TAKE 1/2 TABLET ALL OTHER DAYS OR AS DIRECTED 30 tablet 1   • acetaminophen (TYLENOL) 500 MG tablet Take 500 mg by mouth every 6 (six) hours as needed for mild pain (1-3).     • warfarin (COUMADIN) 2.5 MG tablet Take 2.5 mg by mouth Daily. PT HOLDING FOR SURGERY         Social History     Social History   • Marital status:      Spouse name: N/A   • Number of children: N/A   • Years of education: N/A     Occupational History   • Not on file.     Social History Main Topics   • Smoking status: Former Smoker     Packs/day: 1.50   • Smokeless tobacco: Never Used      Comment: quit 15 years ago   • Alcohol use 1.2 oz/week     2 Cans of beer per week      Comment: SELDOMLY   • Drug use: No   • Sexual activity: Defer     Other Topics Concern   • Not on file     Social History Narrative   • No narrative on file       Family History   Problem Relation Age of Onset   • Heart attack Mother    • Heart disease Mother    • Heart attack Father    • Heart disease Father    • No Known Problems Sister    • No Known Problems Maternal Grandmother    • No Known Problems Maternal Grandfather    • No Known Problems Paternal Grandmother    • No Known Problems Paternal Grandfather    • No Known Problems Sister         REVIEW OF SYSTEMS:   All ROS was performed and is Negative except as outlined in HPI     Objective:     Vitals:    07/04/18 0958 07/04/18 1003 07/04/18 1013 07/04/18 1058   BP: 91/61 (!) 86/45 91/52   "  Pulse: (!) 124 (!) 126 (!) 126    Resp:       Temp:       TempSrc:       SpO2: (!) 87% (!) 83% (!) 87% 98%   Weight:       Height:         Body mass index is 20.61 kg/m².  Flowsheet Rows      First Filed Value   Admission Height  170.2 cm (67\") Documented at 07/02/2018 0753   Admission Weight  59 kg (130 lb) Documented at 07/02/2018 0753          Physical Exam   Physical Exam   Constitutional: He appears well-developed and well-nourished. No distress.   On ventilator on multiple pressors   HENT:   Head: Normocephalic.   Eyes: Conjunctivae are normal. Pupils are equal, round, and reactive to light. No scleral icterus.   Neck: Normal carotid pulses, no hepatojugular reflux and no JVD present. Carotid bruit is not present. No tracheal deviation, no edema and no erythema present. No thyromegaly present.   Cardiovascular: S1 normal, S2 normal, normal heart sounds and intact distal pulses.  An irregularly irregular rhythm present.  No extrasystoles are present. Tachycardia present.  PMI is not displaced.  Exam reveals no gallop, no distant heart sounds and no friction rub.    No murmur heard.  Pulses:       Carotid pulses are 2+ on the right side, and 2+ on the left side.       Radial pulses are 2+ on the right side, and 2+ on the left side.        Femoral pulses are 2+ on the right side, and 2+ on the left side.       Dorsalis pedis pulses are 2+ on the right side, and 2+ on the left side.        Posterior tibial pulses are 2+ on the right side, and 2+ on the left side.   Pulmonary/Chest: Effort normal. No respiratory distress. He has decreased breath sounds in the right lower field and the left lower field. He has no wheezes. He has no rhonchi. He has no rales. He exhibits no tenderness.   Abdominal: Soft. Bowel sounds are normal. He exhibits no distension and no mass. There is no hepatosplenomegaly. There is no tenderness. There is no rebound and no guarding.   Musculoskeletal: He exhibits no edema, tenderness or " deformity.   Neurological:   Sedated on vent   Skin: Skin is warm and dry. No rash noted. He is not diaphoretic. No cyanosis or erythema. No pallor. Nails show no clubbing.   Psychiatric: He has a normal mood and affect. His speech is normal and behavior is normal. Judgment and thought content normal.       Lab Review:                  Results from last 7 days  Lab Units 07/04/18  0540   SODIUM mmol/L 135*   POTASSIUM mmol/L 4.1   CHLORIDE mmol/L 95*   CO2 mmol/L 25.0   BUN mg/dL 45*   CREATININE mg/dL 4.89*   GLUCOSE mg/dL 111*   CALCIUM mg/dL 8.3*       Results from last 7 days  Lab Units 07/02/18  0820   TROPONIN T ng/mL 0.161*       Results from last 7 days  Lab Units 07/04/18  1327   WBC 10*3/mm3 1.16*   HEMOGLOBIN g/dL 9.8*   HEMATOCRIT % 29.2*   PLATELETS 10*3/mm3 132*       Results from last 7 days  Lab Units 07/04/18  0540 07/03/18  0527 07/02/18  2323   INR  1.35* 1.41* 1.36*                 @LABRCNT(bnp)@    I personally viewed and interpreted the patient's EKG/Telemetry data     Assessment:   1.  Shock most likely from severe sepsis white count down to 1 multiple pressors tachycardic prognosis is very grim.  Per intensivist.  2.  Atrial fibrillation chronic now with rapid response again due to the overall status.  He's got an IV dig would not want to give more due to his renal failure he's gotten a couple doses of IV amiodarone to try to slow him I think even slowing his rate properly would make a difference in his overall status infected think his blood pressure may get lower his rate was slower.  We'll continue some type of supportive care all the prognosis is grim.  3.  Coronary disease previous drug-eluting stent placed in the circumflex and October 2016  4.  GI bleeding her gastroenterology hemoglobin is actually stable now.  5.  History of COPD.  6.  History of end-stage renal disease on dialysis.                Plan:

## 2018-07-04 NOTE — ANESTHESIA POSTPROCEDURE EVALUATION
"Patient: Juan Diego Toure    Procedure Summary     Date:  07/04/18 Room / Location:   [facility]    Anesthesia Start:  0806 Anesthesia Stop:  0839    Procedure:  ESOPHAGOGASTRODUODENOSCOPY AT BEDSIDE (N/A Esophagus) Diagnosis:       Acute blood loss anemia      Rectal bleeding      (Acute blood loss anemia [D62])      (Rectal bleeding [K62.5])    Surgeon:  Nilsa Arcos MD Provider:  Francisco Man MD    Anesthesia Type:  MAC ASA Status:  4          Anesthesia Type: MAC  Last vitals  BP   102/48 (07/04/18 0702)   Temp   36.6 °C (97.9 °F) (07/04/18 0334)   Pulse   (!) 126 (07/04/18 0702)   Resp   20 (07/04/18 0631)     SpO2   99 % (07/04/18 0702)     Post Anesthesia Care and Evaluation    Patient location during evaluation: bedside  Patient participation: complete - patient participated  Level of consciousness: awake and alert  Pain management: adequate  Airway patency: patent  Anesthetic complications: No anesthetic complications    Cardiovascular status: acceptable  Respiratory status: acceptable  Hydration status: acceptable    Comments: /48   Pulse (!) 126   Temp 36.6 °C (97.9 °F) (Oral)   Resp 20   Ht 170.2 cm (67\")   Wt 59.7 kg (131 lb 9.8 oz)   SpO2 99%   BMI 20.61 kg/m²       "

## 2018-07-04 NOTE — ANESTHESIA PREPROCEDURE EVALUATION
Anesthesia Evaluation     Patient summary reviewed and Nursing notes reviewed   NPO Solid Status: > 8 hours  NPO Liquid Status: > 8 hours           Airway   Mallampati: II  TM distance: >3 FB  Neck ROM: full  no difficulty expected  Dental - normal exam     Pulmonary - normal exam   (+) a smoker Former, COPD, shortness of breath,   Cardiovascular - normal exam    (+) hypertension, CAD, dysrhythmias Atrial Fib, KENNEDY, hyperlipidemia,       Neuro/Psych  (+) weakness,     GI/Hepatic/Renal/Endo    (+)  GI bleeding, renal disease dialysis and ESRD,     Musculoskeletal     Abdominal  - normal exam   Substance History      OB/GYN          Other      history of cancer                  Anesthesia Plan    ASA 4     MAC     Anesthetic plan and risks discussed with patient.

## 2018-07-04 NOTE — PROCEDURES
Central Venous Catheter Insertion Procedure Note          Indications:   1) Vascular access   2) Pressors administration      Medications:  Fentanyl 100 mcg      Procedure Details:  Informed consent was obtained for the procedure, including sedation. Risks of lung perforation, hemorrhage, arrhythmia, and adverse drug reaction were discussed.       Maximum sterile technique was used including usual patient drapes, antiseptics and physician sterile garments.       Under sterile conditions the skin above the on the left internal jugular vein was prepped with Chlorhexidine and covered with a sterile drape. Local anesthesia was applied to the skin and subcutaneous tissues with lidocaine 1%. An 18-gauge needle was then inserted into the vein. A guide wire was then passed easily through the catheter. A quad-lumen was then inserted into the vessel over the guide wire. The catheter was sutured into place and dressed following sterile protocol with Biopatch placed. Ultrasound used to place needle and visualized in vein prior to dilation.    Off note, right subclavian the right IJ were attempted but failed due to anatomy and small size veins.      Findings:  There were no changes to vital signs. All catheter ports were flushed with saline. Patient did tolerate procedure well.

## 2018-07-04 NOTE — PLAN OF CARE
Problem: Patient Care Overview  Goal: Plan of Care Review  Outcome: Ongoing (interventions implemented as appropriate)   07/04/18 0578   Coping/Psychosocial   Plan of Care Reviewed With patient   Plan of Care Review   Progress no change   OTHER   Outcome Summary Pt not tolerating Golytely overnight, after drinking about 1/3rd of jug began vomiting, emesis bileous with sour odor. Dr. Arcos contacted and 10 mg IV reglan and zofran given. Pt continued to have episodes of vomiting throughout the night, did not make any further progress with drinking Golytely. No bowel movements this shift. SBP slightly low this shift but MAP still maintained > 65 for most of shift. Will continue to monitor. CASTILLO Chinchilla RN

## 2018-07-04 NOTE — PLAN OF CARE
Problem: Patient Care Overview  Goal: Plan of Care Review  Outcome: Ongoing (interventions implemented as appropriate)   Patient alert and oriented at beginning of shift w/o S&S of SOA, starting vomiting during EGD, NG tube placed, possible aspiration, patient respiratory and cardiac status gradually declined over the following hour until patient was intubated and started on pressors, patient unable get dialysis tx due to instability, patient with chronic afib and rate uncontrolled anywhere between 140-200, amiodorone gtt started and cardiology consulted, patient maxed on x3 pressors w/ MAP > 60, family coming to make decision on status   07/04/18 1119   Coping/Psychosocial   Plan of Care Reviewed With patient;family   Plan of Care Review   Progress declining

## 2018-07-04 NOTE — PROGRESS NOTES
"   LOS: 2 days    Patient Care Team:  Uche Castelan MD as PCP - General  Uche Castelan MD as PCP - Family Medicine  Francisco Huynh DPM as PCP - Claims Attributed  James Estuardo Fisher MD as Consulting Physician (General Surgery)  Jessie Espino MD as Consulting Physician (Cardiology)  Aundrea Denson, SHANTEL as Care Coordinator (Population Health)    Chief Complaint:    Chief Complaint   Patient presents with   • Rectal Bleeding     Follow up ESRD.   Subjective     Interval History:   Attempted EGD this am.  Large volume in stomach, vomited, NG placed, but RN concerned he aspirated.  Now extremely SOA.  Agrees to intubation. BP in 80's, pulse in 150's.     Objective     Vital Signs  Temp:  [97.9 °F (36.6 °C)-98.5 °F (36.9 °C)] 97.9 °F (36.6 °C)  Heart Rate:  [] 126  Resp:  [20-22] 20  BP: ()/(25-87) 91/52    Flowsheet Rows      First Filed Value   Admission Height  170.2 cm (67\") Documented at 07/02/2018 0753   Admission Weight  59 kg (130 lb) Documented at 07/02/2018 0753          No intake/output data recorded.  I/O last 3 completed shifts:  In: 1870 [P.O.:1610; I.V.:20; IV Piggyback:240]  Out: 250 [Emesis/NG output:250]    Intake/Output Summary (Last 24 hours) at 07/04/18 1038  Last data filed at 07/04/18 0513   Gross per 24 hour   Intake             1490 ml   Output              250 ml   Net             1240 ml       Physical Exam:  Chronically ill. Very soa, in distress.   Coughing. Heart Tachy, irreg, irreg.  Lungs decreased bs bases, R> L.  Abd distended,  soft, nontender.  Ext no edema.  LUE AVF patent.   Skin extensive ecchymoses over arms.       Results Review:      Results from last 7 days  Lab Units 07/04/18  0540 07/03/18  0528 07/02/18  0820   SODIUM mmol/L 135* 140 137   POTASSIUM mmol/L 4.1 4.3 5.2   CHLORIDE mmol/L 95* 100 94*   CO2 mmol/L 25.0 25.0 25.2   BUN mg/dL 45* 33* 66*   CREATININE mg/dL 4.89* 4.18* 7.06*   CALCIUM mg/dL 8.3* 8.4* 8.6   BILIRUBIN mg/dL  --   --  0.7   ALK PHOS " U/L  --   --  76   ALT (SGPT) U/L  --   --  12   AST (SGOT) U/L  --   --  10   GLUCOSE mg/dL 111* 102* 114*       Estimated Creatinine Clearance: 10.5 mL/min (A) (by C-G formula based on SCr of 4.89 mg/dL (H)).      Results from last 7 days  Lab Units 07/03/18  0528   PHOSPHORUS mg/dL 3.5               Results from last 7 days  Lab Units 07/04/18  0540 07/04/18  0046 07/03/18  1806 07/03/18  1204 07/03/18  0527 07/02/18  2323  07/02/18  0820   WBC 10*3/mm3 6.21  --   --   --   --  4.95  --  4.58   HEMOGLOBIN g/dL 8.5*  8.5* 7.7* 9.4* 8.4* 8.3* 9.7*  < > 6.1*   PLATELETS 10*3/mm3 113*  --   --   --   --  102*  --  131*   < > = values in this interval not displayed.      Results from last 7 days  Lab Units 07/04/18  0540 07/03/18  0527 07/02/18  2323 07/02/18  1755 07/02/18  1140   INR  1.35* 1.41* 1.36* 1.42* 1.44*         Imaging Results (last 24 hours)     Procedure Component Value Units Date/Time    XR Chest 1 View [[id number]] Collected:  07/04/18 0934     Updated:  07/04/18 0939    Narrative:       XR CHEST 1 VW-     HISTORY: Male who is 78 years-old, short of breath, possible aspiration     TECHNIQUE: Frontal view of the chest     COMPARISON: 7/2/2018     FINDINGS: NG tube extends beyond the diaphragm and off image inferiorly.  Heart size is borderline. Pulmonary vasculature appears mildly  congested. Persistent opacity at the right mid to lower lungs suggesting  atelectasis/infiltrate/effusion. No pneumothorax. Otherwise stable.       Impression:       Persistent opacity at the right mid to lower lung, continued  follow-up recommended. Mild pulmonary vascular congestion has developed.     This report was finalized on 7/4/2018 9:36 AM by Dr. Santy Childs M.D.       US Thoracentesis [[id number]] Collected:  07/03/18 1140    Specimen:  Body Fluid Updated:  07/03/18 1143    Narrative:       US THORACENTESIS-     HISTORY: pleural effusion.     PROCEDURE: Ultrasound guided right thoracentesis     Procedure:  Informed consent was obtained. Preliminary sonography  performed. Pleural effusion demonstrated. The overlying skin was prepped  in the usual sterile fashion. Local anesthesia was achieved with 1%  lidocaine. A small nick was made in the skin with a scalpel. Through the  nick was inserted a needle catheter device under sonographic guidance.  The needle was removed and the catheter remained and was connected to  suction. 1950 cc of ezekiel fluid was withdrawn. The patient tolerated the  procedure without evidence for complication and left the procedure room  in stable condition.       Impression:       1. Technically successful ultrasound guided thoracentesis.     This report was finalized on 7/3/2018 11:40 AM by Dr. Valentino Quintana M.D.             epoetin ariel 10,000 Units Intravenous Once per day on Mon Wed Fri   ipratropium-albuterol 3 mL Nebulization Q6H - RT   metoclopramide 10 mg Intravenous 4x Daily AC & at Bedtime   Propofol          hold 1 each    pantoprazole 8 mg/hr Last Rate: 8 mg/hr (07/04/18 0541)       Medication Review:   Current Facility-Administered Medications   Medication Dose Route Frequency Provider Last Rate Last Dose   • epoetin ariel (EPOGEN,PROCRIT) injection 10,000 Units  10,000 Units Intravenous Once per day on Mon Wed Fri Dimple Wayne MD       • Hold medication  1 each Does not apply Continuous PRN Raoul Dougherty MD       • ipratropium-albuterol (DUO-NEB) nebulizer solution 3 mL  3 mL Nebulization Q6H - RT Raoul Dougherty MD   3 mL at 07/04/18 0631   • lidocaine-prilocaine (EMLA) 2.5-2.5 % cream   Topical PRN Pollo Mccann MD   1 application at 07/02/18 1221   • metoclopramide (REGLAN) injection 10 mg  10 mg Intravenous 4x Daily AC & at Bedtime Nilsa Arcos MD       • ondansetron (ZOFRAN) injection 4 mg  4 mg Intravenous Q4H PRN Nilsa Arcos MD   4 mg at 07/04/18 0324   • pantoprazole (PROTONIX) 40 mg/100 mL (0.4 mg/mL) in 0.9% NS IVPB  8 mg/hr Intravenous Continuous  Pam Pace, APRN 20 mL/hr at 07/04/18 0541 8 mg/hr at 07/04/18 0541   • promethazine (PHENERGAN) injection 12.5 mg  12.5 mg Intravenous Q6H PRN Nilsa Arcos MD       • Propofol (DIPRIVAN) 10 MG/ML injection  - ADS Override Pull            • sodium chloride 0.9 % flush 1-10 mL  1-10 mL Intravenous PRN Raoul Dougherty MD           Assessment/Plan      1.  End-stage renal disease.  Dialysis today after intubation if BP allows.   2.  GI bleed with acute blood loss anemia, transfuse prn.  Unable to complete scopes due to aspiration during attempted EGC. Recheck CBC now.    3.  Acute on chronic hypoxic respiratory failure.  Intubation now. Possible aspiration.  Per Dr. Gamez.   4.  Prior history of prostatectomy for prostate cancer  5.  Shock,  Likely due to meds, afib with RVR and aspiration PNA. Start heidi if BP remains low. Change propofol to precedex.  6.    Chronic A. fib, chronically anticoagulated.  RVR this am. One dose dig since hypotensive.   7.  Warfarin toxicity. INR corrected.   8. Pleural effusion.  SP thoracentesis yesterday 1900 cc.     I called his sister in law Sally.  Explained situation,  Answered questions.  She is on the way.   Dw Dr. Gamez. Ok with one dose digoxin.       Dimple Wayne MD  07/04/18  10:38 AM

## 2018-07-04 NOTE — PROGRESS NOTES
[facility]  Inpatient Progress Note    Reason for Follow Up: GI bleed    Subjective     Interval History:   Attempted egd and aborted due to significant liquid stomach contents with regurgitation and likely aspiration during procedure.  No blood seen    Current Facility-Administered Medications:   •  epoetin ariel (EPOGEN,PROCRIT) injection 10,000 Units, 10,000 Units, Intravenous, Once per day on Mon Wed Fri, Dimple Wayne MD  •  Hold medication, 1 each, Does not apply, Continuous PRN, Raoul Dougherty MD  •  ipratropium-albuterol (DUO-NEB) nebulizer solution 3 mL, 3 mL, Nebulization, Q6H - RT, Raoul Dougherty MD, 3 mL at 07/04/18 0631  •  lidocaine-prilocaine (EMLA) 2.5-2.5 % cream, , Topical, PRN, Pollo Mccann MD, 1 application at 07/02/18 1221  •  ondansetron (ZOFRAN) injection 4 mg, 4 mg, Intravenous, Q4H PRN, Nilsa Arcos MD, 4 mg at 07/04/18 0324  •  pantoprazole (PROTONIX) 40 mg/100 mL (0.4 mg/mL) in 0.9% NS IVPB, 8 mg/hr, Intravenous, Continuous, Pam Pace APRN, Last Rate: 20 mL/hr at 07/04/18 0541, 8 mg/hr at 07/04/18 0541  •  promethazine (PHENERGAN) injection 12.5 mg, 12.5 mg, Intravenous, Q6H PRN, Nilsa Arcos MD  •  sodium bicarbonate tablet 650 mg, 650 mg, Oral, TID, Raoul Dougherty MD, 650 mg at 07/03/18 2004  •  sodium chloride 0.9 % flush 1-10 mL, 1-10 mL, Intravenous, PRN, Raoul Dougherty MD  Review of Systems:    All systems were reviewed and negative except for:  Gastrointestinal: postitive for  nausea    Objective     Vital Signs  Temp:  [97.9 °F (36.6 °C)-98.5 °F (36.9 °C)] 97.9 °F (36.6 °C)  Heart Rate:  [] 126  Resp:  [20-22] 20  BP: ()/(25-76) 102/48  Body mass index is 20.61 kg/m².    Intake/Output Summary (Last 24 hours) at 07/04/18 0834  Last data filed at 07/04/18 0513   Gross per 24 hour   Intake             1490 ml   Output              250 ml   Net             1240 ml     No intake/output data recorded.     Physical  Exam:   General: patient awake, alert and cooperative   Eyes: Normal lids and lashes, no scleral icterus   Neck: supple, normal ROM   Skin: warm and dry, not jaundiced   Abdomen: soft, nontender, distended; normal bowel sounds   Rectal: deferred   Extremities: no rash or edema   Psychiatric: Normal mood and behavior; memory intact     Results Review:     I reviewed the patient's new clinical results.      Results from last 7 days  Lab Units 07/04/18  0540 07/04/18  0046 07/03/18  1806  07/02/18  2323  07/02/18  0820   WBC 10*3/mm3 6.21  --   --   --  4.95  --  4.58   HEMOGLOBIN g/dL 8.5*  8.5* 7.7* 9.4*  < > 9.7*  < > 6.1*   HEMATOCRIT % 26.3*  26.3* 23.3* 29.2*  < > 29.5*  < > 18.7*   PLATELETS 10*3/mm3 113*  --   --   --  102*  --  131*   < > = values in this interval not displayed.    Results from last 7 days  Lab Units 07/04/18  0540 07/03/18  0528 07/02/18  0820   SODIUM mmol/L 135* 140 137   POTASSIUM mmol/L 4.1 4.3 5.2   CHLORIDE mmol/L 95* 100 94*   CO2 mmol/L 25.0 25.0 25.2   BUN mg/dL 45* 33* 66*   CREATININE mg/dL 4.89* 4.18* 7.06*   CALCIUM mg/dL 8.3* 8.4* 8.6   BILIRUBIN mg/dL  --   --  0.7   ALK PHOS U/L  --   --  76   ALT (SGPT) U/L  --   --  12   AST (SGOT) U/L  --   --  10   GLUCOSE mg/dL 111* 102* 114*       Results from last 7 days  Lab Units 07/04/18  0540 07/03/18  0527 07/02/18  2323   INR  1.35* 1.41* 1.36*     No results found for: LIPASE    Radiology:  US Thoracentesis   Final Result   1. Technically successful ultrasound guided thoracentesis.       This report was finalized on 7/3/2018 11:40 AM by Dr. Valentino Quintana M.D.          XR Chest 1 View   Final Result      XR Chest 1 View    (Results Pending)       Assessment/Plan     Patient Active Problem List   Diagnosis   • COPD (chronic obstructive pulmonary disease) (CMS/HCC)   • Chronic kidney disease   • Atrial fibrillation (CMS/HCC)   • Dyspnea   • Essential hypertension   • Hyperlipemia   • Coronary artery disease involving native  coronary artery of native heart without angina pectoris   • Hemodialysis patient (CMS/HCC)   • S/P drug eluting coronary stent placement   • Cardiomyopathy (CMS/HCC)   • Rectal bleeding   • Acute blood loss anemia     Impression  1. Hematochezia: setting of supratherapeutic INR, occurring since 6/29  2. Acute blood loss anemia  3. Coagulopathy: INR ?10  4. A fib: on warfarin  5. ESRD: on dialysis  6. Sigmoid diverticulosis: as per 2015 imaging     Plan  -INR corrected - no further bleeding  - bleeding risk still elevated due to recent plavix  - continue to follow h/h - transfuse prn  - will try to work to get bowels moving today - he had no BMs with golytely prep despite having BMs the day prior.  Continue NGT suction (800 out post procedure) and watch condition.  Will need egd and c/s when he can be adequately and safely prepped  - check cxr to evaluate for aspiration     I discussed the patients findings and my recommendations with patient and nursing staff.    Nilsa Arcos MD    Events noted - case discussed with patient's nurse -   Initial CXR showed only vascular congestion and no new infiltrate, however patient gradually decompensated after the procedure and had to be intubated.  Now hypotensive with evidence of cardiogenic and septic shock. Family notified per nursing but not yet arrived.  Intensivist has discussed events with patient's children.

## 2018-07-05 NOTE — PROGRESS NOTES
"   LOS: 3 days    Patient Care Team:  Uche Castelan MD as PCP - General  Uche Castelan MD as PCP - Family Medicine  Francisco Huynh DPM as PCP - Claims Attributed  James Fisher MD as Consulting Physician (General Surgery)  Jessie Espino MD as Consulting Physician (Cardiology)  Aundrea Denson, RN as Care Coordinator (Population Health)    Chief Complaint:    Chief Complaint   Patient presents with   • Rectal Bleeding     Follow up ESRD.   Subjective     Interval History:   The patient is currently on the ventilator he apparently had aspiration currently on 100% FiO2 and he is on multiple pressors and he is sedated.  According to his nurse there is no evidence of active GI bleed at this time.    Review of Systems:   Not obtainable     Objective     Vital Signs  Temp:  [98.8 °F (37.1 °C)-101.5 °F (38.6 °C)] 98.8 °F (37.1 °C)  Heart Rate:  [109-179] 112  Resp:  [20] 20  BP: ()/(27-97) 92/64  FiO2 (%):  [100 %] 100 %    Flowsheet Rows      First Filed Value   Admission Height  170.2 cm (67\") Documented at 07/02/2018 0753   Admission Weight  59 kg (130 lb) Documented at 07/02/2018 0753          No intake/output data recorded.  I/O last 3 completed shifts:  In: 4427 [P.O.:1250; I.V.:2693; NG/GT:30; IV Piggyback:454]  Out: 550 [Emesis/NG output:550]    Intake/Output Summary (Last 24 hours) at 07/05/18 0708  Last data filed at 07/05/18 0533   Gross per 24 hour   Intake             2937 ml   Output              300 ml   Net             2637 ml       Physical Exam:  General Appearance: Orally intubated, sedate, no acute distress,   Skin: warm and dry, multiple ecchymoses over the arms  HEENT: Nonicteric sclerae, orally intubated Neck: supple, no JVD, trachea midline  Lungs: CTA, unlabored breathing effort  Heart: Irregularly irregular, no rub  Abdomen: soft, no guarding,  present bowel sounds to auscultation  : no palpable bladder,  Extremities: 2+ lower extremity edema, he has functional left upper " extremity AV fistula  Neuro: Unable to assess         Results Review:      Results from last 7 days  Lab Units 07/05/18  0433 07/04/18  0540 07/03/18  0528 07/02/18  0820   SODIUM mmol/L 136 135* 140 137   POTASSIUM mmol/L 4.3 4.1 4.3 5.2   CHLORIDE mmol/L 96* 95* 100 94*   CO2 mmol/L 17.2* 25.0 25.0 25.2   BUN mg/dL 55* 45* 33* 66*   CREATININE mg/dL 6.28* 4.89* 4.18* 7.06*   CALCIUM mg/dL 7.8* 8.3* 8.4* 8.6   BILIRUBIN mg/dL 1.2  --   --  0.7   ALK PHOS U/L 54  --   --  76   ALT (SGPT) U/L 15  --   --  12   AST (SGOT) U/L 35  --   --  10   GLUCOSE mg/dL 83 111* 102* 114*       Estimated Creatinine Clearance: 8.1 mL/min (A) (by C-G formula based on SCr of 6.28 mg/dL (H)).      Results from last 7 days  Lab Units 07/03/18  0528   PHOSPHORUS mg/dL 3.5               Results from last 7 days  Lab Units 07/05/18  0433 07/05/18  0029 07/04/18  1457 07/04/18  1327 07/04/18  0540   WBC 10*3/mm3 5.40 4.40* 0.94* 1.16* 6.21   HEMOGLOBIN g/dL 9.4*  9.4* 9.4* 9.6* 9.8* 8.5*  8.5*   PLATELETS 10*3/mm3 140 145 106* 132* 113*         Results from last 7 days  Lab Units 07/04/18  0540 07/03/18  0527 07/02/18  2323 07/02/18  1755 07/02/18  1140   INR  1.35* 1.41* 1.36* 1.42* 1.44*         Imaging Results (last 24 hours)     Procedure Component Value Units Date/Time    XR Chest Post CVA Port [[id number]] Collected:  07/04/18 1303     Updated:  07/04/18 1334    Narrative:       PORTABLE CHEST     HISTORY: Intubation and central line placement.     COMPARISON: AP chest 07/04/2008 at 7:56 AM.     FINDINGS: A left IJ central venous catheter is in place with tip  extending into the region of the left brachiocephalic vein. Endotracheal  tube tip is 5 cm above the joshua. Nasogastric tube courses into the  stomach and off the radiograph inferiorly. Extensive bilateral  infiltrates are present and infiltrate appears increased and more  consolidated in the left perihilar region when compared to the prior  exam same date. Moderate right and  small left pleural effusions are  present. No evidence for pneumothorax.     This report was finalized on 7/4/2018 1:31 PM by Dr. Farhad Downs M.D.       XR Chest 1 View [[id number]] Collected:  07/04/18 0934     Updated:  07/04/18 0939    Narrative:       XR CHEST 1 VW-     HISTORY: Male who is 78 years-old, short of breath, possible aspiration     TECHNIQUE: Frontal view of the chest     COMPARISON: 7/2/2018     FINDINGS: NG tube extends beyond the diaphragm and off image inferiorly.  Heart size is borderline. Pulmonary vasculature appears mildly  congested. Persistent opacity at the right mid to lower lungs suggesting  atelectasis/infiltrate/effusion. No pneumothorax. Otherwise stable.       Impression:       Persistent opacity at the right mid to lower lung, continued  follow-up recommended. Mild pulmonary vascular congestion has developed.     This report was finalized on 7/4/2018 9:36 AM by Dr. Santy Childs M.D.             epoetin ariel 10,000 Units Intravenous Once per day on Mon Wed Fri   hydrocortisone sodium succinate 50 mg Intravenous Q6H   metoclopramide 5 mg Intravenous 4x Daily AC & at Bedtime   piperacillin-tazobactam 4.5 g Intravenous Q12H       amiodarone 0.5 mg/min Last Rate: 0.5 mg/min (07/05/18 0642)   dexmedetomidine 0.2-1.5 mcg/kg/hr Last Rate: 1.494 mcg/kg/hr (07/05/18 0352)   hold 1 each    norepinephrine 0.02-0.3 mcg/kg/min Last Rate: 0.3 mcg/kg/min (07/05/18 0619)   pantoprazole 8 mg/hr Last Rate: 8 mg/hr (07/05/18 0529)   phenylephrine 0.5-3 mcg/kg/min Last Rate: 3 mcg/kg/min (07/04/18 2039)   propofol 5-50 mcg/kg/min Last Rate: Stopped (07/04/18 1922)   vasopressin 0.03 Units/min Last Rate: 0.03 Units/min (07/05/18 0017)       Medication Review:   Current Facility-Administered Medications   Medication Dose Route Frequency Provider Last Rate Last Dose   • acetaminophen (TYLENOL) suppository 650 mg  650 mg Rectal Q4H PRN Yang Mendez MD   650 mg at 07/04/18 2100   •  albumin human 25 % IV SOLN 12.5 g  12.5 g Intravenous Q1H PRN Dimple Wayne MD       • amiodarone (NEXTERONE) 360 mg/200 mL (1.8 mg/mL) infusion  0.5 mg/min Intravenous Continuous Raoul Dougherty MD 16.67 mL/hr at 07/05/18 0642 0.5 mg/min at 07/05/18 0642   • dexmedetomidine (PRECEDEX) 400 mcg/100 mL (4 mcg/mL) infusion  0.2-1.5 mcg/kg/hr Intravenous Titrated Dimple Wayne MD 22.3 mL/hr at 07/05/18 0352 1.494 mcg/kg/hr at 07/05/18 0352   • epoetin ariel (EPOGEN,PROCRIT) injection 10,000 Units  10,000 Units Intravenous Once per day on Mon Wed Fri Dimple Wayne MD   Stopped at 07/04/18 1304   • Hold medication  1 each Does not apply Continuous PRN Raoul Dougherty MD       • hydrocortisone sodium succinate (Solu-CORTEF) injection 50 mg  50 mg Intravenous Q6H Raoul Dougherty MD   50 mg at 07/05/18 0355   • lidocaine-prilocaine (EMLA) 2.5-2.5 % cream   Topical PRN Pollo Mccann MD   1 application at 07/02/18 1221   • metoclopramide (REGLAN) injection 5 mg  5 mg Intravenous 4x Daily AC & at Bedtime Nilsa Arcos MD   5 mg at 07/05/18 0641   • norepinephrine (LEVOPHED) 8 mg/250 mL (32 mcg/mL) in sodium chloride 0.9% infusion (premix)  0.02-0.3 mcg/kg/min Intravenous Titrated Raoul Dougherty MD 33.58 mL/hr at 07/05/18 0619 0.3 mcg/kg/min at 07/05/18 0619   • ondansetron (ZOFRAN) injection 4 mg  4 mg Intravenous Q4H PRN Nilsa Arcos MD   4 mg at 07/04/18 0324   • pantoprazole (PROTONIX) 40 mg/100 mL (0.4 mg/mL) in 0.9% NS IVPB  8 mg/hr Intravenous Continuous LINDA Rand 20 mL/hr at 07/05/18 0529 8 mg/hr at 07/05/18 0529   • phenylephrine (MARIE-SYNEPHRINE) 50 mg/250 mL (0.2 mg/mL) in 0.9% NS  infusion  0.5-3 mcg/kg/min Intravenous Titrated Dimple Wayne MD 53.7 mL/hr at 07/04/18 2039 3 mcg/kg/min at 07/04/18 2039   • piperacillin-tazobactam (ZOSYN) 4.5 g in iso-osmotic dextrose 100 mL IVPB (premix)  4.5 g Intravenous Q12H Raoul Dougherty MD   4.5 g at 07/05/18 0016   • promethazine  (PHENERGAN) injection 12.5 mg  12.5 mg Intravenous Q6H PRN Nilsa Arcos MD       • propofol (DIPRIVAN) infusion 10 mg/mL 100 mL  5-50 mcg/kg/min Intravenous Titrated Raoul Dougherty MD   Stopped at 07/04/18 1922   • sodium chloride 0.9 % flush 1-10 mL  1-10 mL Intravenous PRN Raoul Dougherty MD       • vasopressin (PITRESSIN) 20 Units in sodium chloride 0.9 % 100 mL (0.2 Units/mL) infusion  0.03 Units/min Intravenous Titrated Raoul Dougherty MD 9 mL/hr at 07/05/18 0017 0.03 Units/min at 07/05/18 0017       Assessment/Plan      1.  End-stage renal disease associated with chronic interstitial nephritis, He normally gets dialysis every Monday, Wednesday and Friday but he was not stable yesterday, hence, dialysis was held.  2.  GI bleed with acute blood loss anemia, transfuse prn.  The hemoglobin today is 9.4 stable Volume and K can tolerate.   3.  COPD, now has acute respiratory failure on the ventilator with the relative hypoxia.  Probable aspiration  4.  Prior history of prostatectomy for prostate cancer  5.   patient has history of hypertension but currently he is in shock on multiple pressors  6.  Fluid excess better  7.  Chronic A. fib, chronically anticoagulated, had warfarin toxicity on admission his last INR was 1.23  8.  Warfarin toxicity. INR corrected.   9. Pleural effusion.  He had 1900 cc drained and he had the thoracentesis.      Plan:  1.  I had long discussion with the patient's son and the bedside.  The prognosis is poor.   2.  In regard of dialysis he is extremely high risk for trying dialysis today.,  We'll try a short treatment hoping to remove some fluid.  3.  Surveillance labs        Pollo Mccann MD  07/05/18  7:08 AM

## 2018-07-05 NOTE — NURSING NOTE
"Notified [facility] patient on ventilator and patient and patient family requesting to withdrawal care on patient. Kimi with [facility] states \"may withdrawal care and call with Time of death\". patient and patient family refused dialysis and do not want vasopressors. Spoke to Dr. Dougherty on unit, consult to palliative in.   "

## 2018-07-05 NOTE — PROGRESS NOTES
Hospital Follow Up      Chief Complaint: Follow up atrial fibrillation, CAD, CHF    Interval History:  Remains on vent with 100% FiO2 and maximum dose of 3 pressors.    Objective:     Objective:  Temp:  [98.8 °F (37.1 °C)-101.5 °F (38.6 °C)] 98.8 °F (37.1 °C)  Heart Rate:  [109-179] 112  Resp:  [20] 20  BP: ()/(27-97) 92/64  FiO2 (%):  [100 %] 100 %     Intake/Output Summary (Last 24 hours) at 07/05/18 0722  Last data filed at 07/05/18 0533   Gross per 24 hour   Intake             2937 ml   Output              300 ml   Net             2637 ml     Body mass index is 20.3 kg/m².  1    07/03/18 0530 07/04/18  0513 07/05/18 0530   Weight: 56.6 kg (124 lb 12.5 oz) 59.7 kg (131 lb 9.8 oz) 58.8 kg (129 lb 10.1 oz)     Weight change: -0.9 kg (-1 lb 15.7 oz)      Physical Exam:   General : Alert, cooperative, in no acute distress.  Neuro: alert,cooperative and oriented  Lungs: CTAB. Normal respiratory effort and rate.  CV:: irregularly, irregular, rate and rhythm, normal S1 and S2, no murmurs, gallops or rubs.  ABD: Soft, nontender, non-distended. positive bowel sounds  Extr: No edema or cyanosis, moves all extremities    Lab Review:     Results from last 7 days  Lab Units 07/05/18  0433 07/04/18  0540  07/02/18  0820   SODIUM mmol/L 136 135*  < > 137   POTASSIUM mmol/L 4.3 4.1  < > 5.2   CHLORIDE mmol/L 96* 95*  < > 94*   CO2 mmol/L 17.2* 25.0  < > 25.2   BUN mg/dL 55* 45*  < > 66*   CREATININE mg/dL 6.28* 4.89*  < > 7.06*   GLUCOSE mg/dL 83 111*  < > 114*   CALCIUM mg/dL 7.8* 8.3*  < > 8.6   AST (SGOT) U/L 35  --   --  10   ALT (SGPT) U/L 15  --   --  12   < > = values in this interval not displayed.    Results from last 7 days  Lab Units 07/04/18  1457 07/02/18  0820   CK TOTAL U/L 248*  --    TROPONIN T ng/mL 0.133* 0.161*       Results from last 7 days  Lab Units 07/05/18  0433 07/05/18  0029   WBC 10*3/mm3 5.40 4.40*   HEMOGLOBIN g/dL 9.4*  9.4* 9.4*   HEMATOCRIT % 29.0*  29.0* 28.8*   PLATELETS 10*3/mm3  140 145       Results from last 7 days  Lab Units 07/04/18 0540 07/03/18 0527   INR  1.35* 1.41*               Invalid input(s): LDLCALC          I reviewed the patient's new clinical results.  I personally viewed and interpreted the patient's EKG  Current Medications:   Scheduled Meds:  epoetin ariel 10,000 Units Intravenous Once per day on Mon Wed Fri   hydrocortisone sodium succinate 50 mg Intravenous Q6H   metoclopramide 5 mg Intravenous 4x Daily AC & at Bedtime   piperacillin-tazobactam 4.5 g Intravenous Q12H     Continuous Infusions:  amiodarone 0.5 mg/min Last Rate: 0.5 mg/min (07/05/18 0642)   dexmedetomidine 0.2-1.5 mcg/kg/hr Last Rate: 1 mcg/kg/hr (07/05/18 0709)   hold 1 each    norepinephrine 0.02-0.3 mcg/kg/min Last Rate: 0.3 mcg/kg/min (07/05/18 0619)   pantoprazole 8 mg/hr Last Rate: 8 mg/hr (07/05/18 0529)   phenylephrine 0.5-3 mcg/kg/min Last Rate: 3 mcg/kg/min (07/04/18 2039)   propofol 5-50 mcg/kg/min Last Rate: Stopped (07/04/18 1922)   vasopressin 0.03 Units/min Last Rate: 0.03 Units/min (07/05/18 0017)       Allergies:  No Known Allergies    Assessment/Plan:     1. Shock. Possible septic shock. No significant improvement.   2. GI bleed. Currently with stable hemoglobin.  3. Chronic atrial fibrillation. Fair rate control on amiodarone gtt.  Elevated rate due to shock.   4. Cardiomyopathy. EF of 42%.  Volume management per nephrology.   5. Coronary artery disease status.  Status post prior stent in 10/2016  6. History of COPD  7. ESRD. On chronic hemodialysis  8. Chronic anticoagulation.  For atrial fibrillation.  INR elevated on admission and has since been corrected.     -  Spoke with the patient's son at bedside.  After chart review and examination of the patient today I told him based on the patient's lack of any improvement I felt his prognosis was very poor and that comfort measures should be considered.  I told him to think it over and discuss with other family members once they had  arrived.       Jessie Espino MD  07/05/18  7:22 AM

## 2018-07-05 NOTE — ACP (ADVANCE CARE PLANNING)
Requested to see patient regarding Advance Directive.  Presently, patient is on ventilator, unable to communicate, and has specific code status.   Will follow up when patient is off ventilator.

## 2018-07-05 NOTE — DISCHARGE SUMMARY
Admission date: 18  Discharge date: 18  Discharge condition: Patient is     Discharge diagnosis:  1. Aspiration pneumonia with Klebsiella pneumonia  2. Klebsiella pneumonia bacteremia  3. Acute hypoxic and hypercapnic respiratory failure, secondary to #1 and pulmonary vascular congestion  4. Septic shock with multiorgan failure  5. Pulmonary vascular congestion  6. Atrial fibrillation with RVR  7. Persistent Large right pleural effusion, s/p right thora 7/3/18 --> 1900 ml, exudative  8. Hematochezia On presentation  9. Melena On 7/3/18  10. Hemorrhagic shock, on presentation  11. Acute blood loss anemia, on presentation  12. Chronic anticoagulation with warfarin, aspirin and Plavix  13. Coumadin toxicity  14. Lactic acidosis  15. Systolic congestive heart failure with exacerbation  16. ESRD on HD  17. COPD, no exacerbation  18. Pulmonary hypertension per echocardiogram, likely secondary to CHF and lung disease  19. Afib w RVR    History of present illness:  This is a 78-year-old male patient with history of ESRD on HD MWF (Dr. Santana) and coronary artery disease status post BAILEE left circumflex on 10/27/16 in addition to atrial fibrillation.  He is on triple anticoagulation with aspirin, Plavix and Coumadin.  He has mild COPD and uses to follow with Dr. Morales in our office.  He denies a prior history of gastrointestinal bleeding but reported that he had a colonoscopy about 5 years ago which was reportedly unrevealing.  He presented to the emergency department today for red blood per rectum which started last Friday.  He had too many episodes since then.  He stated that his wife was admitted here 4 months ago on Saturday and passed away so he did not want to come to the hospital.  He denies associated dizziness, nausea, vomiting or abdominal pain.  He stated that he had a good appetite.  He denies taking over the counter Advil, Aleve, ibuprofen or other NSAID.     He drove himself to the emergency  department.  Upon arrival to the ED, he was found to be hypotensive with a systolic blood pressure of 17 heart rate of 120.  He was in atrial fibrillation as well.  He received 2 500 ml bolus IV fluid which improved his blood pressure up to 110.  Heart rate remains elevated.  Hemoglobin was 6.1 and his INR was greater than 10.  He received Kcentra and vitamin K.     Labs reviewed:  Hemoglobin 6.1, last was 11; INR of greater than 10; troponin = 0.1; potassium = 52; creatinine = 7; lactic acid = 2.9     Data:  I reviewed the report of prior left heart catheterization.  Echocardiogram on 11/14/17 showed EF of 42% with severely dilated RV and RVSP >55 mm    Course in the hospital:  Patient was admitted to the intensive care unit.  Warfarin was reversed.  DDAVP was transfused.  He was transfused with blood as well.  Blood pressure was corrected.  Hemoglobin was corrected.  Per report, he had hematochezia initially and therefore lower GI bleeding was suspected.  Later on he developed melena.  It was decided to proceed with upper endoscopy to rule out upper GI bleeding.  Unfortunately, he vomited during endoscopy which was done with an LMA tube.  He aspirated and developed respiratory failure.  He was intubated.  Bronchoscopy revealed mild in his airways.  Culture ultimately showed gram-negative.  Patient was initiated on antibiotics promptly after intubation.  He also became septic and was treated with intravenous pressors.  He stayed on the ventilator for one day.  Despite his illness he was awake and was communicating by stretcher.  He and his family indicated that he does not want to be on life support and does not want dialysis anymore.  Therefore he was terminally extubated and passed comfortably.  Blood pressure stabilized.  He underwent dialysis for his renal failure

## 2018-07-05 NOTE — PROGRESS NOTES
"                                              LOS: 3 days   Patient Care Team:  Uche Castelan MD as PCP - General  Uche Castelan MD as PCP - Family Medicine  Francisco Huynh DPM as PCP - Claims Attributed  James Fisher MD as Consulting Physician (General Surgery)  Jessie Espino MD as Consulting Physician (Cardiology)  Aundrea Denson, RN as Care Coordinator ([facility])    Chief Complaint:  Follow-up on GI bleeding, hemorrhagic shock, ICU management and medical problems listed below    Interval History:   He is on leave for fed, Khai-Synephrine and vasopressin on maximum dose.  He is calm while on Precedex drip at 1 mg an hour.  Amiodarone was started yesterday but despite that his heart rate is 120-150.  He is on % FiO2.  Overall, he is Worse.    Patient refused dialysis and does not want to be on the ventilator.  He was communicating by gestures.  Family at bedside and are agreeable    REVIEW OF SYSTEMS:   Cannot obtain due to mechanical ventilation and the severity of his illness    Ventilator/Non-Invasive Ventilation Settings     None            Vital Signs  Temp:  [97.5 °F (36.4 °C)-101.5 °F (38.6 °C)] 97.5 °F (36.4 °C)  Heart Rate:  [109-179] 140  Resp:  [20] 20  BP: ()/(27-79) 92/63  FiO2 (%):  [80 %-100 %] 80 %    Intake/Output Summary (Last 24 hours) at 07/05/18 1202  Last data filed at 07/05/18 0533   Gross per 24 hour   Intake             2937 ml   Output              300 ml   Net             2637 ml     Flowsheet Rows      First Filed Value   Admission Height  170.2 cm (67\") Documented at 07/02/2018 0753   Admission Weight  59 kg (130 lb) Documented at 07/02/2018 0753          Physical Exam:   General Appearance:    Tachypneic    Lungs:     Bilateral coarse crackles.  No wheezing     Heart:    Irregular rhythm and tachycardia.  No murmur    Chest Wall:    No abnormalities observed   Abdomen:     Slightly distended.  Soft.  No tenderness    Neuro:   Conscious, moves all " extremities.Responds to verbal stimulus     Extremities:   Moves all extremities well, Trace edema, no cyanosis, no             Redness          Results Review:          Results from last 7 days  Lab Units 07/05/18  0433 07/04/18  0540 07/03/18  0528   SODIUM mmol/L 136 135* 140   POTASSIUM mmol/L 4.3 4.1 4.3   CHLORIDE mmol/L 96* 95* 100   CO2 mmol/L 17.2* 25.0 25.0   BUN mg/dL 55* 45* 33*   CREATININE mg/dL 6.28* 4.89* 4.18*   GLUCOSE mg/dL 83 111* 102*   CALCIUM mg/dL 7.8* 8.3* 8.4*       Results from last 7 days  Lab Units 07/04/18  1457 07/02/18  0820   CK TOTAL U/L 248*  --    TROPONIN T ng/mL 0.133* 0.161*       Results from last 7 days  Lab Units 07/05/18  0433 07/05/18  0029 07/04/18  1457   WBC 10*3/mm3 5.40 4.40* 0.94*   HEMOGLOBIN g/dL 9.4*  9.4* 9.4* 9.6*   HEMATOCRIT % 29.0*  29.0* 28.8* 29.1*   PLATELETS 10*3/mm3 140 145 106*       Results from last 7 days  Lab Units 07/04/18  0540 07/03/18  0527 07/02/18  2323   INR  1.35* 1.41* 1.36*           I reviewed the patient's new clinical results.  I personally viewed and interpreted the patient's CXR        Medication Review:     epoetin ariel 10,000 Units Intravenous Once per day on Mon Wed Fri   hydrocortisone sodium succinate 50 mg Intravenous Q6H   metoclopramide 5 mg Intravenous 4x Daily AC & at Bedtime   piperacillin-tazobactam 4.5 g Intravenous Q12H         amiodarone 0.5 mg/min Last Rate: 0.5 mg/min (07/05/18 1143)   dexmedetomidine 0.2-1.5 mcg/kg/hr Last Rate: 1 mcg/kg/hr (07/05/18 1054)   fentaNYL (SUBLIMAZE) infusion 5 mcg/ml 250 mL  mcg/hr    hold 1 each    norepinephrine 0.02-0.3 mcg/kg/min Last Rate: 0.3 mcg/kg/min (07/05/18 1142)   pantoprazole 8 mg/hr Last Rate: 8 mg/hr (07/05/18 0947)   phenylephrine 0.5-3 mcg/kg/min Last Rate: 3 mcg/kg/min (07/05/18 0946)   propofol 5-50 mcg/kg/min Last Rate: Stopped (07/04/18 1922)   vasopressin 0.03 Units/min Last Rate: 0.03 Units/min (07/05/18 0945)       Diagnostic imaging:  I personally and  independently reviewed the following images:   Diffuse bilateral infiltrates with vascular congestion.  Right lower lobe consolidation and effusion             Assessment:  1. Aspiration pneumonia/pneumonitis  2. Acute hypoxic and hypercapnic respiratory failure, secondary to #1 and pulmonary vascular congestion  3. Pulmonary vascular congestion  4. Septic/cardiogenic shock with multiorgan failure  5. Atrial fibrillation with RVR, rate WORSE  6. Persistent Large right pleural effusion, s/p right thora 7/3/18 --> 1900 ml, exudative  7. Hematochezia On presentation  8. Melena On 7/3/18  9. Hemorrhagic shock, on presentation  10. Acute blood loss anemia  11. Chronic anticoagulation with warfarin, aspirin and Plavix  12. Coumadin toxicity  13. Mild lactic acidosis, likely related to hypertension.  No sepsis.  14. Systolic congestive heart failure with mild exacerbation  15. ESRD on HD  16. COPD, no exacerbation  17. Pulmonary hypertension per echocardiogram, likely secondary to CHF and lung disease  18. Afib w RVR, uncontrolled     Plan:    Discussed with the family at length.  Prognosis is poor.  Family and patient requested palliative care.  Pressors were cut  Down gradually.  Palliative orders placed.  Patient  comfortably.  Discussed with ICU team during the multidisciplinary round.  Discussed with palliative care nurse.    Spent more than 35 minutes, more than half of that time directed toward counseling and coordination of care as above.    Raoul Dougherty MD  18  12:02 PM          This note was dictated utilizing Dragon dictation

## 2018-07-05 NOTE — CONSULTS
Purpose of the visit was to evaluate for: goals of care/advanced care planning, support for patient/family, pain/symptom management, withdrawal of interventions, transfer to comfort care bed/unit and comfort care. Spoke with MD and RN as well as patient and family and discussed palliative care, goals of care, care options and resuscitation status.      Assessment:  Patient is palliative care appropriate for inpatient care given end stage renal disease, cardiomyopathy, sepsis, COPD, with recent GI bleeding. Client is alert and oriented, understands benefits and burdens of decisions. He has written his son a note to end all of this today. PPS 30%. He does not want anymore dialysis and wants to be taken off ventilator and medications.     Recommendations/Plan: If stable after withdrawal of interventions, will transfer to [facility] for palliative care.    Other Comments: Met with patient, son and other family members at bedside. They are in agreement with the patient that he wants to stop interventions to prolong life and understand that he would pass away. If he survives for any length of time, we will transfer to Palliative Care. Answered questions and gave information. Dr. Dougherty made aware of family and patient decision.     Total Time: 15 minutes.

## 2018-07-05 NOTE — PLAN OF CARE
Problem: Patient Care Overview  Goal: Plan of Care Review  Outcome: Ongoing (interventions implemented as appropriate)   07/05/18 0631   Coping/Psychosocial   Plan of Care Reviewed With patient;son   Plan of Care Review   Progress no change   OTHER   Outcome Summary Pt continues maxed on Levo, Khai, Vaso with borderline blood pressure. Son from [place] arrived late last night. Comfort measures were discussed and rest of family plans to return this am to discuss moving forward with comfort measures. Pt calm and appears comfortable on vent throughout shift. Drops O2 sats with turns. Will continue to monitor. CASTILLO Chinchilla RN       Problem: Fall Risk (Adult)  Goal: Identify Related Risk Factors and Signs and Symptoms  Outcome: Ongoing (interventions implemented as appropriate)      Problem: Gastrointestinal Bleeding (Adult)  Goal: Signs and Symptoms of Listed Potential Problems Will be Absent, Minimized or Managed (Gastrointestinal Bleeding)  Outcome: Ongoing (interventions implemented as appropriate)      Problem: Restraint, Nonbehavioral (Nonviolent)  Goal: Rationale and Justification  Outcome: Ongoing (interventions implemented as appropriate)   07/05/18 0631   Restraint, Nonbehavioral (Nonviolent)   Rationale and Justification prevent line/tube removal     Goal: Nonbehavioral (Nonviolent) Restraint: Absence of Injury/Harm  Outcome: Ongoing (interventions implemented as appropriate)   07/05/18 0631   Restraint, Nonbehavioral (Nonviolent)   Nonbehavioral (Nonviolent) Restraint: Absence of Injury/Harm met     Goal: Nonbehavioral (Nonviolent) Restraint: Achievement of Discontinuation Criteria  Outcome: Ongoing (interventions implemented as appropriate)   07/05/18 0631   Restraint, Nonbehavioral (Nonviolent)   Nonbehavioral (Nonviolent) Restraint: Achievement of Discontinuation Criteria not met     Goal: Nonbehavioral (Nonviolent) Restraint: Preservation of Dignity and Wellbeing  Outcome: Ongoing (interventions implemented as  appropriate)   07/05/18 0631   Restraint, Nonbehavioral (Nonviolent)   Nonbehavioral (Nonviolent) Restraint: Preservation of Dignity and Wellbeing met       Problem: Skin Injury Risk (Adult)  Goal: Identify Related Risk Factors and Signs and Symptoms  Outcome: Ongoing (interventions implemented as appropriate)   07/05/18 0631   Skin Injury Risk (Adult)   Related Risk Factors (Skin Injury Risk) advanced age;critical care admission;infection;mobility impaired;medication     Goal: Skin Health and Integrity  Outcome: Ongoing (interventions implemented as appropriate)   07/05/18 0631   Skin Injury Risk (Adult)   Skin Health and Integrity making progress toward outcome

## 2018-07-05 NOTE — NURSING NOTE
Spoke to Michelle with [facility], Patient ruled out for all tissue and organ donation. Case ID # [id number]. Hospital  notified of patient expiration.

## 2018-07-05 NOTE — PROGRESS NOTES
[facility]  Inpatient Progress Note    Reason for Follow Up:  GI bleed    Subjective     Interval History: Discussed with RN, discussed with patient who is awake and somewhat responsive as well as his family at bedside.  H&H relatively stable.  No clinical evidence of gastrointestinal bleeding at this time.  Requiring significant pressure support as well as ventilatory support.  No plans for interventional procedures from a GI perspective at this time.  Would monitor H&H and treat accordingly.      Current Facility-Administered Medications:   •  acetaminophen (TYLENOL) suppository 650 mg, 650 mg, Rectal, Q4H PRN, Yang Mendez MD, 650 mg at 18 2100  •  [COMPLETED] amiodarone in dextrose 5% (NEXTERONE) loading dose 150mg/100mL, 150 mg, Intravenous, Once, 150 mg at 18 1347 **FOLLOWED BY** [] amiodarone (NEXTERONE) 360 mg/200 mL (1.8 mg/mL) infusion, 1 mg/min, Intravenous, Continuous, Last Rate: 33.3 mL/hr at 18 1936, 1 mg/min at 18 1936 **FOLLOWED BY** amiodarone (NEXTERONE) 360 mg/200 mL (1.8 mg/mL) infusion, 0.5 mg/min, Intravenous, Continuous, Raoul Dougherty MD, Last Rate: 16.67 mL/hr at 18 0642, 0.5 mg/min at 18 0642  •  dexmedetomidine (PRECEDEX) 400 mcg/100 mL (4 mcg/mL) infusion, 0.2-1.5 mcg/kg/hr, Intravenous, Titrated, Dimple Wayne MD, Last Rate: 14.93 mL/hr at 18 0709, 1 mcg/kg/hr at 18 0709  •  epoetin ariel (EPOGEN,PROCRIT) injection 10,000 Units, 10,000 Units, Intravenous, Once per day on , Dimple Wayne MD, Stopped at 18 1304  •  Hold medication, 1 each, Does not apply, Continuous PRN, Raoul Dougherty MD  •  hydrocortisone sodium succinate (Solu-CORTEF) injection 50 mg, 50 mg, Intravenous, Q6H, Raoul Dougherty MD, 50 mg at 18 0355  •  lidocaine-prilocaine (EMLA) 2.5-2.5 % cream, , Topical, PRN, Pollo Mccann MD, 1 application at 18 1221  •  metoclopramide (REGLAN) injection 5  mg, 5 mg, Intravenous, 4x Daily AC & at Bedtime, Nilsa Arcos MD, 5 mg at 07/05/18 0641  •  norepinephrine (LEVOPHED) 8 mg/250 mL (32 mcg/mL) in sodium chloride 0.9% infusion (premix), 0.02-0.3 mcg/kg/min, Intravenous, Titrated, Raoul Dougherty MD, Last Rate: 33.58 mL/hr at 07/05/18 0619, 0.3 mcg/kg/min at 07/05/18 0619  •  ondansetron (ZOFRAN) injection 4 mg, 4 mg, Intravenous, Q4H PRN, Nilsa Arcos MD, 4 mg at 07/04/18 0324  •  pantoprazole (PROTONIX) 40 mg/100 mL (0.4 mg/mL) in 0.9% NS IVPB, 8 mg/hr, Intravenous, Continuous, LINDA Rnad, Last Rate: 20 mL/hr at 07/05/18 0529, 8 mg/hr at 07/05/18 0529  •  phenylephrine (MARIE-SYNEPHRINE) 50 mg/250 mL (0.2 mg/mL) in 0.9% NS  infusion, 0.5-3 mcg/kg/min, Intravenous, Titrated, Dimple Wayne MD, Last Rate: 53.7 mL/hr at 07/04/18 2039, 3 mcg/kg/min at 07/04/18 2039  •  piperacillin-tazobactam (ZOSYN) 4.5 g in iso-osmotic dextrose 100 mL IVPB (premix), 4.5 g, Intravenous, Q12H, Raoul Dougherty MD, 4.5 g at 07/05/18 0016  •  promethazine (PHENERGAN) injection 12.5 mg, 12.5 mg, Intravenous, Q6H PRN, Nilsa Arcos MD  •  propofol (DIPRIVAN) infusion 10 mg/mL 100 mL, 5-50 mcg/kg/min, Intravenous, Titrated, Raoul Dougherty MD, Stopped at 07/04/18 1922  •  sodium chloride 0.9 % flush 1-10 mL, 1-10 mL, Intravenous, PRN, Raoul Dougherty MD  •  vasopressin (PITRESSIN) 20 Units in sodium chloride 0.9 % 100 mL (0.2 Units/mL) infusion, 0.03 Units/min, Intravenous, Titrated, Raoul Dougherty MD, Last Rate: 9 mL/hr at 07/05/18 0017, 0.03 Units/min at 07/05/18 0017  Review of Systems:    Review of systems could not be obtained due to  patient intubated.    Objective     Vital Signs  Temp:  [98.8 °F (37.1 °C)-101.5 °F (38.6 °C)] 98.8 °F (37.1 °C)  Heart Rate:  [109-179] 126  Resp:  [20] 20  BP: ()/(27-97) 95/72  FiO2 (%):  [80 %-100 %] 80 %  Body mass index is 20.3 kg/m².    Intake/Output Summary (Last 24 hours) at 07/05/18 0812  Last data filed at 07/05/18  0533   Gross per 24 hour   Intake             2937 ml   Output              300 ml   Net             2637 ml     No intake/output data recorded.     Physical Exam:   General: patient awake, On vent, responds to questions with yes and no   Eyes: Normal lids and lashes, no scleral icterus   Neck: supple, normal ROM   Skin: warm and dry, not jaundiced   Cardiovascular: regular rhythm and rate, no murmurs auscultated   Pulm: clear to auscultation bilaterally, regular and unlabored   Abdomen: soft, nontender, distended; diminished bowel sounds   Rectal: deferred   Extremities: no rash or edema   Psychiatric: Difficult to assess while on ventilator     Results Review:     I reviewed the patient's new clinical results.      Results from last 7 days  Lab Units 07/05/18  0433 07/05/18  0029 07/04/18  1457   WBC 10*3/mm3 5.40 4.40* 0.94*   HEMOGLOBIN g/dL 9.4*  9.4* 9.4* 9.6*   HEMATOCRIT % 29.0*  29.0* 28.8* 29.1*   PLATELETS 10*3/mm3 140 145 106*       Results from last 7 days  Lab Units 07/05/18  0433 07/04/18  0540 07/03/18  0528 07/02/18  0820   SODIUM mmol/L 136 135* 140 137   POTASSIUM mmol/L 4.3 4.1 4.3 5.2   CHLORIDE mmol/L 96* 95* 100 94*   CO2 mmol/L 17.2* 25.0 25.0 25.2   BUN mg/dL 55* 45* 33* 66*   CREATININE mg/dL 6.28* 4.89* 4.18* 7.06*   CALCIUM mg/dL 7.8* 8.3* 8.4* 8.6   BILIRUBIN mg/dL 1.2  --   --  0.7   ALK PHOS U/L 54  --   --  76   ALT (SGPT) U/L 15  --   --  12   AST (SGOT) U/L 35  --   --  10   GLUCOSE mg/dL 83 111* 102* 114*       Results from last 7 days  Lab Units 07/04/18  0540 07/03/18  0527 07/02/18  2323   INR  1.35* 1.41* 1.36*     No results found for: LIPASE    Radiology:  XR Chest Post CVA Port   Final Result      XR Chest 1 View   Final Result   Persistent opacity at the right mid to lower lung, continued   follow-up recommended. Mild pulmonary vascular congestion has developed.       This report was finalized on 7/4/2018 9:36 AM by Dr. Santy Childs M.D.          US Thoracentesis    Final Result   1. Technically successful ultrasound guided thoracentesis.       This report was finalized on 7/3/2018 11:40 AM by Dr. Valentino Quintana M.D.          XR Chest 1 View   Final Result          Assessment/Plan     Patient Active Problem List   Diagnosis   • COPD (chronic obstructive pulmonary disease) (CMS/HCC)   • Chronic kidney disease   • Atrial fibrillation (CMS/HCC)   • Dyspnea   • Essential hypertension   • Hyperlipemia   • Coronary artery disease involving native coronary artery of native heart without angina pectoris   • Hemodialysis patient (CMS/[facility])   • S/P drug eluting coronary stent placement   • Cardiomyopathy (CMS/[facility])   • Rectal bleeding   • Acute blood loss anemia     Impression  #1 GI bleeding: Etiology not defined, unable to complete upper endoscopic evaluation due to patient instability.  Currently no report of active GI bleeding.  #2 respiratory compromise: Suspect combination of aspiration, pleural effusion, pulmonary hypertension, COPD  #3 hemodynamic instability: Likely due to sepsis/shock      Recommendation  Monitor H&H every 12 hours  Continue supportive measures as per family's wishes  Prognosis poor  I discussed the patients findings and my recommendations with patient, family and nursing staff.    Damian Alcaraz MD

## 2018-07-06 LAB
BACTERIA FLD CULT: ABNORMAL
GRAM STN SPEC: ABNORMAL
GRAM STN SPEC: ABNORMAL

## 2018-07-07 LAB
BACTERIA FLD CULT: NO GROWTH
BACTERIA SPEC AEROBE CULT: ABNORMAL
GRAM STN SPEC: ABNORMAL
GRAM STN SPEC: NORMAL
ISOLATED FROM: ABNORMAL

## 2018-07-09 LAB — BACTERIA SPEC AEROBE CULT: NORMAL

## 2018-09-08 NOTE — PLAN OF CARE
Problem: Patient Care Overview  Goal: Interprofessional Rounds/Family Conf  Outcome: Ongoing (interventions implemented as appropriate)   07/03/18 7408   Interdisciplinary Rounds/Family Conf   Participants ;dietitian/nutrition services;nursing;patient          Normal rate, regular rhythm, normal S1, S2 heart sounds heard.

## (undated) DEVICE — BITEBLOCK OMNI BLOC

## (undated) DEVICE — GLV SURG BIOGEL LTX PF 7 1/2

## (undated) DEVICE — TUBING, SUCTION, 1/4" X 10', STRAIGHT: Brand: MEDLINE

## (undated) DEVICE — SUT MNCRYL PLS ANTIB UD 4/0 PS2 18IN

## (undated) DEVICE — CANN NASL CO2 TRULINK W/O2 A/

## (undated) DEVICE — DRSNG SURESITE WNDW 4X4.5

## (undated) DEVICE — ADHS SKIN DERMABOND TOP ADVANCED

## (undated) DEVICE — NDL HYPO ECLPS SFTY 22G 1 1/2IN

## (undated) DEVICE — IRRIGATOR BULB ASEPTO 60CC STRL

## (undated) DEVICE — Device: Brand: DEFENDO AIR/WATER/SUCTION AND BIOPSY VALVE

## (undated) DEVICE — APPL CHLORAPREP W/TINT 26ML ORNG

## (undated) DEVICE — 3M™ STERI-STRIP™ REINFORCED ADHESIVE SKIN CLOSURES, R1547, 1/2 IN X 4 IN (12 MM X 100 MM), 6 STRIPS/ENVELOPE: Brand: 3M™ STERI-STRIP™

## (undated) DEVICE — STPLR SKIN VISISTAT WD 35CT

## (undated) DEVICE — THE TORRENT IRRIGATION SCOPE CONNECTOR IS USED WITH THE TORRENT IRRIGATION TUBING TO PROVIDE IRRIGATION FLUIDS SUCH AS STERILE WATER DURING GASTROINTESTINAL ENDOSCOPIC PROCEDURES WHEN USED IN CONJUNCTION WITH AN IRRIGATION PUMP (OR ELECTROSURGICAL UNIT).: Brand: TORRENT

## (undated) DEVICE — 3M™ STERI-STRIP™ COMPOUND BENZOIN TINCTURE 40 BAGS/CARTON 4 CARTONS/CASE C1544: Brand: 3M™ STERI-STRIP™

## (undated) DEVICE — LOU MINOR PROCEDURE: Brand: MEDLINE INDUSTRIES, INC.

## (undated) DEVICE — TBG 02 CRUSH RESIST LF CLR 7FT

## (undated) DEVICE — SPNG GZ WOVN 4X4IN 12PLY 10/BX STRL

## (undated) DEVICE — SUT VIC 0 TN 27IN DYED JTN0G